# Patient Record
Sex: FEMALE | Race: WHITE | NOT HISPANIC OR LATINO | Employment: FULL TIME | URBAN - METROPOLITAN AREA
[De-identification: names, ages, dates, MRNs, and addresses within clinical notes are randomized per-mention and may not be internally consistent; named-entity substitution may affect disease eponyms.]

---

## 2019-04-17 ENCOUNTER — TRANSCRIBE ORDERS (OUTPATIENT)
Dept: ADMINISTRATIVE | Facility: HOSPITAL | Age: 59
End: 2019-04-17

## 2019-04-17 DIAGNOSIS — N95.0 POSTMENOPAUSAL BLEEDING: Primary | ICD-10-CM

## 2019-04-26 ENCOUNTER — HOSPITAL ENCOUNTER (OUTPATIENT)
Dept: ULTRASOUND IMAGING | Facility: HOSPITAL | Age: 59
Discharge: HOME | End: 2019-04-26
Attending: INTERNAL MEDICINE
Payer: COMMERCIAL

## 2019-04-26 DIAGNOSIS — N95.0 POSTMENOPAUSAL BLEEDING: ICD-10-CM

## 2019-04-26 PROCEDURE — 76856 US EXAM PELVIC COMPLETE: CPT

## 2019-04-26 PROCEDURE — 76830 TRANSVAGINAL US NON-OB: CPT

## 2020-03-16 ENCOUNTER — OFFICE VISIT (OUTPATIENT)
Dept: LAB | Facility: HOSPITAL | Age: 60
End: 2020-03-16
Attending: INTERNAL MEDICINE
Payer: COMMERCIAL

## 2020-03-16 ENCOUNTER — TRANSCRIBE ORDERS (OUTPATIENT)
Dept: ADMINISTRATIVE | Facility: HOSPITAL | Age: 60
End: 2020-03-16

## 2020-03-16 DIAGNOSIS — Z91.89 PERSONAL HISTORY OF POISONING, PRESENTING HAZARDS TO HEALTH: ICD-10-CM

## 2020-03-16 DIAGNOSIS — Z91.89 PERSONAL HISTORY OF POISONING, PRESENTING HAZARDS TO HEALTH: Primary | ICD-10-CM

## 2020-03-16 LAB
ATRIAL RATE: 89 BPM
P AXIS: 66 DEGREES
PR INTERVAL: 150 MS
QRS AXIS: 3 DEGREES
QRSD INTERVAL: 142 MS
QT INTERVAL: 402 MS
QTC INTERVAL: 489 MS
T WAVE AXIS: 30 DEGREES
VENTRICULAR RATE: 89 BPM

## 2020-03-16 PROCEDURE — 93005 ELECTROCARDIOGRAM TRACING: CPT

## 2020-03-16 PROCEDURE — 93010 ELECTROCARDIOGRAM REPORT: CPT | Performed by: INTERNAL MEDICINE

## 2021-12-16 ENCOUNTER — APPOINTMENT (OUTPATIENT)
Dept: RADIOLOGY | Facility: CLINIC | Age: 61
End: 2021-12-16

## 2021-12-16 ENCOUNTER — OFFICE VISIT (OUTPATIENT)
Dept: URGENT CARE | Facility: CLINIC | Age: 61
End: 2021-12-16

## 2021-12-16 VITALS
HEIGHT: 63 IN | OXYGEN SATURATION: 97 % | WEIGHT: 230 LBS | TEMPERATURE: 98.2 F | HEART RATE: 92 BPM | BODY MASS INDEX: 40.75 KG/M2 | RESPIRATION RATE: 18 BRPM

## 2021-12-16 DIAGNOSIS — S89.92XA INJURY OF LEFT KNEE, INITIAL ENCOUNTER: ICD-10-CM

## 2021-12-16 DIAGNOSIS — S89.92XA INJURY OF LEFT KNEE, INITIAL ENCOUNTER: Primary | ICD-10-CM

## 2021-12-16 DIAGNOSIS — M25.462 EFFUSION OF LEFT KNEE: ICD-10-CM

## 2021-12-16 PROCEDURE — 73564 X-RAY EXAM KNEE 4 OR MORE: CPT

## 2021-12-16 PROCEDURE — 99213 OFFICE O/P EST LOW 20 MIN: CPT | Performed by: NURSE PRACTITIONER

## 2021-12-16 RX ORDER — METHYLPREDNISOLONE 4 MG/1
TABLET ORAL
Qty: 21 TABLET | Refills: 0 | Status: SHIPPED | OUTPATIENT
Start: 2021-12-16

## 2021-12-19 ENCOUNTER — HOSPITAL ENCOUNTER (EMERGENCY)
Facility: HOSPITAL | Age: 61
Discharge: HOME | End: 2021-12-19
Attending: EMERGENCY MEDICINE
Payer: COMMERCIAL

## 2021-12-19 VITALS
RESPIRATION RATE: 16 BRPM | DIASTOLIC BLOOD PRESSURE: 80 MMHG | OXYGEN SATURATION: 97 % | TEMPERATURE: 98.3 F | HEART RATE: 90 BPM | SYSTOLIC BLOOD PRESSURE: 168 MMHG

## 2021-12-19 DIAGNOSIS — M25.562 LEFT KNEE PAIN: Primary | ICD-10-CM

## 2021-12-19 DIAGNOSIS — M19.90 ARTHRITIS: ICD-10-CM

## 2021-12-19 PROCEDURE — 99283 EMERGENCY DEPT VISIT LOW MDM: CPT

## 2021-12-19 PROCEDURE — 99284 EMERGENCY DEPT VISIT MOD MDM: CPT | Performed by: EMERGENCY MEDICINE

## 2021-12-19 RX ORDER — OXYCODONE HYDROCHLORIDE 5 MG/1
5 TABLET ORAL EVERY 6 HOURS PRN
Qty: 5 TABLET | Refills: 0 | Status: SHIPPED | OUTPATIENT
Start: 2021-12-19 | End: 2021-12-22

## 2021-12-19 RX ORDER — HYDROMORPHONE HCL/PF 1 MG/ML
0.5 SYRINGE (ML) INJECTION ONCE
Status: DISCONTINUED | OUTPATIENT
Start: 2021-12-19 | End: 2021-12-19

## 2021-12-19 RX ORDER — OXYCODONE HYDROCHLORIDE 5 MG/1
5 TABLET ORAL ONCE
Status: COMPLETED | OUTPATIENT
Start: 2021-12-19 | End: 2021-12-19

## 2021-12-19 RX ADMIN — OXYCODONE HYDROCHLORIDE 5 MG: 5 TABLET ORAL at 22:19

## 2021-12-22 ENCOUNTER — OFFICE VISIT (OUTPATIENT)
Dept: OBGYN CLINIC | Facility: CLINIC | Age: 61
End: 2021-12-22
Payer: COMMERCIAL

## 2021-12-22 ENCOUNTER — HOSPITAL ENCOUNTER (OUTPATIENT)
Dept: NON INVASIVE DIAGNOSTICS | Facility: HOSPITAL | Age: 61
Discharge: HOME | End: 2021-12-22
Attending: FAMILY MEDICINE
Payer: COMMERCIAL

## 2021-12-22 VITALS
BODY MASS INDEX: 40.74 KG/M2 | SYSTOLIC BLOOD PRESSURE: 154 MMHG | TEMPERATURE: 98.6 F | HEART RATE: 96 BPM | HEIGHT: 63 IN | DIASTOLIC BLOOD PRESSURE: 82 MMHG

## 2021-12-22 DIAGNOSIS — M25.462 EFFUSION OF LEFT KNEE: ICD-10-CM
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
## 2021-12-22 DIAGNOSIS — M95.8 OSTEOCHONDRAL DEFECT OF FEMORAL CONDYLE: ICD-10-CM

## 2021-12-22 DIAGNOSIS — M79.662 PAIN OF LEFT CALF: ICD-10-CM

## 2021-12-22 DIAGNOSIS — M79.662 PAIN OF LEFT CALF: Primary | ICD-10-CM

## 2021-12-22 PROCEDURE — 99205 OFFICE O/P NEW HI 60 MIN: CPT | Performed by: FAMILY MEDICINE

## 2021-12-22 PROCEDURE — 93971 EXTREMITY STUDY: CPT

## 2021-12-22 PROCEDURE — 93971 EXTREMITY STUDY: CPT | Performed by: SURGERY

## 2021-12-22 RX ORDER — HYDROCODONE BITARTRATE AND ACETAMINOPHEN 5; 325 MG/1; MG/1
1 TABLET ORAL EVERY 6 HOURS PRN
Qty: 15 TABLET | Refills: 0 | Status: SHIPPED | OUTPATIENT
Start: 2021-12-22

## 2021-12-22 RX ORDER — IBUPROFEN 800 MG/1
800 TABLET ORAL EVERY 8 HOURS PRN
Qty: 45 TABLET | Refills: 0 | Status: SHIPPED | OUTPATIENT
Start: 2021-12-22

## 2021-12-23 ENCOUNTER — TELEPHONE (OUTPATIENT)
Dept: OBGYN CLINIC | Facility: CLINIC | Age: 61
End: 2021-12-23

## 2021-12-29 ENCOUNTER — HOSPITAL ENCOUNTER (OUTPATIENT)
Dept: CT IMAGING | Facility: HOSPITAL | Age: 61
Discharge: HOME | End: 2021-12-29
Attending: FAMILY MEDICINE
Payer: COMMERCIAL
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
## 2021-12-29 DIAGNOSIS — M79.662 PAIN OF LEFT CALF: ICD-10-CM

## 2021-12-29 DIAGNOSIS — M25.462 EFFUSION OF LEFT KNEE: ICD-10-CM

## 2021-12-29 PROCEDURE — G1004 CDSM NDSC: HCPCS

## 2021-12-29 PROCEDURE — 73700 CT LOWER EXTREMITY W/O DYE: CPT

## 2022-01-03 ENCOUNTER — TELEPHONE (OUTPATIENT)
Dept: OBGYN CLINIC | Facility: CLINIC | Age: 62
End: 2022-01-03

## 2022-01-03 NOTE — TELEPHONE ENCOUNTER
Called to advise Chapin Crews an earlier appt times is not available  Chapin Zavalaws (daughter) asked if bloodwork could be ordered since her mother is still in a lot of pain and maybe it could be something else

## 2022-01-03 NOTE — TELEPHONE ENCOUNTER
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

## 2022-01-12 ENCOUNTER — OFFICE VISIT (OUTPATIENT)
Dept: OBGYN CLINIC | Facility: CLINIC | Age: 62
End: 2022-01-12
Payer: COMMERCIAL

## 2022-01-12 VITALS
DIASTOLIC BLOOD PRESSURE: 76 MMHG | HEIGHT: 63 IN | SYSTOLIC BLOOD PRESSURE: 126 MMHG | BODY MASS INDEX: 40.74 KG/M2 | HEART RATE: 105 BPM | TEMPERATURE: 97.7 F

## 2022-01-12 DIAGNOSIS — M25.562 ACUTE PAIN OF LEFT KNEE: ICD-10-CM
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
## 2022-01-12 DIAGNOSIS — M71.22 SYNOVIAL CYST OF POPLITEAL SPACE (BAKER), LEFT KNEE: ICD-10-CM

## 2022-01-12 DIAGNOSIS — M25.462 EFFUSION OF LEFT KNEE: Primary | ICD-10-CM

## 2022-01-12 PROCEDURE — 99214 OFFICE O/P EST MOD 30 MIN: CPT | Performed by: FAMILY MEDICINE

## 2022-01-12 PROCEDURE — 20610 DRAIN/INJ JOINT/BURSA W/O US: CPT | Performed by: FAMILY MEDICINE

## 2022-01-12 RX ORDER — LIDOCAINE HYDROCHLORIDE 10 MG/ML
4 INJECTION, SOLUTION INFILTRATION; PERINEURAL
Status: COMPLETED | OUTPATIENT
Start: 2022-01-12 | End: 2022-01-12

## 2022-01-12 RX ORDER — LEVOTHYROXINE SODIUM 88 UG/1
TABLET ORAL
COMMUNITY

## 2022-01-12 RX ORDER — TORSEMIDE 10 MG/1
10 TABLET ORAL DAILY
COMMUNITY
Start: 2022-01-06 | End: 2023-01-06

## 2022-01-12 RX ORDER — TRIAMCINOLONE ACETONIDE 40 MG/ML
40 INJECTION, SUSPENSION INTRA-ARTICULAR; INTRAMUSCULAR
Status: COMPLETED | OUTPATIENT
Start: 2022-01-12 | End: 2022-01-12

## 2022-01-12 RX ADMIN — LIDOCAINE HYDROCHLORIDE 4 ML: 10 INJECTION, SOLUTION INFILTRATION; PERINEURAL at 09:30

## 2022-01-12 RX ADMIN — TRIAMCINOLONE ACETONIDE 40 MG: 40 INJECTION, SUSPENSION INTRA-ARTICULAR; INTRAMUSCULAR at 09:30

## 2022-01-12 NOTE — PATIENT INSTRUCTIONS
F/u 1 wk  Icing/OTC pain meds as needed  Mild improvement in pain- 4cc of fluid aspirated and steroid injection given

## 2022-01-12 NOTE — LETTER
January 12, 2022     Patient: Luzma Nieto   YOB: 1960   Date of Visit: 1/12/2022       To Whom it May Concern:    Luzma Nieto is under my professional care  She was seen in my office on 1/12/2022  She is excused from work due to the medical condition I am treating them for from 1/12-19/2022  If you have any questions or concerns, please don't hesitate to call           Sincerely,          Carlos Champion MD        CC: Luzma Nieto

## 2022-01-20 ENCOUNTER — HOSPITAL ENCOUNTER (EMERGENCY)
Facility: HOSPITAL | Age: 62
Discharge: HOME | End: 2022-01-20
Attending: EMERGENCY MEDICINE | Admitting: EMERGENCY MEDICINE
Payer: COMMERCIAL

## 2022-01-20 ENCOUNTER — APPOINTMENT (EMERGENCY)
Dept: ULTRASOUND IMAGING | Facility: HOSPITAL | Age: 62
End: 2022-01-20
Payer: COMMERCIAL

## 2022-01-20 ENCOUNTER — OFFICE VISIT (OUTPATIENT)
Dept: OBGYN CLINIC | Facility: CLINIC | Age: 62
End: 2022-01-20
Payer: COMMERCIAL

## 2022-01-20 VITALS
RESPIRATION RATE: 18 BRPM | DIASTOLIC BLOOD PRESSURE: 66 MMHG | OXYGEN SATURATION: 97 % | TEMPERATURE: 98.5 F | HEART RATE: 89 BPM | SYSTOLIC BLOOD PRESSURE: 151 MMHG

## 2022-01-20 VITALS
BODY MASS INDEX: 40.75 KG/M2 | TEMPERATURE: 98.2 F | WEIGHT: 230 LBS | DIASTOLIC BLOOD PRESSURE: 81 MMHG | HEART RATE: 105 BPM | HEIGHT: 63 IN | SYSTOLIC BLOOD PRESSURE: 147 MMHG

## 2022-01-20 DIAGNOSIS — M71.22 SYNOVIAL CYST OF POPLITEAL SPACE (BAKER), LEFT KNEE: ICD-10-CM

## 2022-01-20 DIAGNOSIS — R60.9 EDEMA: ICD-10-CM

## 2022-01-20 DIAGNOSIS — M25.462 EFFUSION OF LEFT KNEE: Primary | ICD-10-CM
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
## 2022-01-20 DIAGNOSIS — N93.9 VAGINAL BLEEDING: Primary | ICD-10-CM

## 2022-01-20 DIAGNOSIS — M25.562 ACUTE PAIN OF LEFT KNEE: ICD-10-CM

## 2022-01-20 LAB
ANION GAP SERPL CALCULATED.3IONS-SCNC: 11 MMOL/L (ref 4–13)
APTT PPP: 32 SECONDS (ref 23–37)
BASOPHILS # BLD AUTO: 0.04 THOUSANDS/ΜL (ref 0–0.1)
BASOPHILS NFR BLD AUTO: 0 % (ref 0–1)
BUN SERPL-MCNC: 20 MG/DL (ref 5–25)
CALCIUM SERPL-MCNC: 9.8 MG/DL (ref 8.4–10.2)
CHLORIDE SERPL-SCNC: 97 MMOL/L (ref 96–108)
CO2 SERPL-SCNC: 28 MMOL/L (ref 21–32)
CREAT SERPL-MCNC: 0.61 MG/DL (ref 0.6–1.3)
EOSINOPHIL # BLD AUTO: 0.17 THOUSAND/ΜL (ref 0–0.61)
EOSINOPHIL NFR BLD AUTO: 1 % (ref 0–6)
ERYTHROCYTE [DISTWIDTH] IN BLOOD BY AUTOMATED COUNT: 12.9 % (ref 11.6–15.1)
GFR SERPL CREATININE-BSD FRML MDRD: 97 ML/MIN/1.73SQ M
GLUCOSE SERPL-MCNC: 169 MG/DL (ref 65–140)
HCT VFR BLD AUTO: 40 % (ref 34.8–46.1)
HGB BLD-MCNC: 13.3 G/DL (ref 11.5–15.4)
IMM GRANULOCYTES # BLD AUTO: 0.05 THOUSAND/UL (ref 0–0.2)
IMM GRANULOCYTES NFR BLD AUTO: 0 % (ref 0–2)
INR PPP: 1.02 (ref 0.84–1.19)
LYMPHOCYTES # BLD AUTO: 2.79 THOUSANDS/ΜL (ref 0.6–4.47)
LYMPHOCYTES NFR BLD AUTO: 22 % (ref 14–44)
MCH RBC QN AUTO: 30.6 PG (ref 26.8–34.3)
MCHC RBC AUTO-ENTMCNC: 33.3 G/DL (ref 31.4–37.4)
MCV RBC AUTO: 92 FL (ref 82–98)
MONOCYTES # BLD AUTO: 1.19 THOUSAND/ΜL (ref 0.17–1.22)
MONOCYTES NFR BLD AUTO: 10 % (ref 4–12)
NEUTROPHILS # BLD AUTO: 8.25 THOUSANDS/ΜL (ref 1.85–7.62)
NEUTS SEG NFR BLD AUTO: 67 % (ref 43–75)
NRBC BLD AUTO-RTO: 0 /100 WBCS
PLATELET # BLD AUTO: 408 THOUSANDS/UL (ref 149–390)
PMV BLD AUTO: 8.7 FL (ref 8.9–12.7)
POTASSIUM SERPL-SCNC: 3.6 MMOL/L (ref 3.5–5.3)
PROTHROMBIN TIME: 13.3 SECONDS (ref 11.6–14.5)
RBC # BLD AUTO: 4.35 MILLION/UL (ref 3.81–5.12)
SODIUM SERPL-SCNC: 136 MMOL/L (ref 135–147)
WBC # BLD AUTO: 12.49 THOUSAND/UL (ref 4.31–10.16)

## 2022-01-20 PROCEDURE — 85610 PROTHROMBIN TIME: CPT | Performed by: EMERGENCY MEDICINE

## 2022-01-20 PROCEDURE — 80048 BASIC METABOLIC PNL TOTAL CA: CPT | Performed by: EMERGENCY MEDICINE

## 2022-01-20 PROCEDURE — 76830 TRANSVAGINAL US NON-OB: CPT

## 2022-01-20 PROCEDURE — 99284 EMERGENCY DEPT VISIT MOD MDM: CPT | Performed by: EMERGENCY MEDICINE

## 2022-01-20 PROCEDURE — 85730 THROMBOPLASTIN TIME PARTIAL: CPT | Performed by: EMERGENCY MEDICINE

## 2022-01-20 PROCEDURE — 76856 US EXAM PELVIC COMPLETE: CPT

## 2022-01-20 PROCEDURE — 99284 EMERGENCY DEPT VISIT MOD MDM: CPT

## 2022-01-20 PROCEDURE — 36415 COLL VENOUS BLD VENIPUNCTURE: CPT | Performed by: EMERGENCY MEDICINE

## 2022-01-20 PROCEDURE — 85025 COMPLETE CBC W/AUTO DIFF WBC: CPT | Performed by: EMERGENCY MEDICINE

## 2022-01-20 PROCEDURE — 99214 OFFICE O/P EST MOD 30 MIN: CPT | Performed by: FAMILY MEDICINE

## 2022-01-20 NOTE — LETTER
January 20, 2022     Patient: Kacie Quan   YOB: 1960   Date of Visit: 1/20/2022       To Whom it May Concern:    Kacie Quan is under my professional care  She was seen in my office on 1/20/2022  She is excused from work due to the medical condition I am treating her for from 1/20/22-2/20/22  If you have any questions or concerns, please don't hesitate to call           Sincerely,          Oswaldo Davila MD        CC: Kacie Quan

## 2022-01-20 NOTE — PROGRESS NOTES
[facility] [place] [address]  [place] [id number] [phone]      Chief Complaint:  Chief Complaint   Patient presents with    Left Knee - Follow-up       Vitals:  /81   Pulse 105   Temp 98 2 °F (36 8 °C)   Ht 5' 3" (1 6 m)   Wt 104 kg (230 lb)   BMI 40 74 kg/m²     The following portions of the patient's history were reviewed and updated as appropriate: allergies, current medications, past family history, past medical history, past social history, past surgical history, and problem list       Subjective:   Patient ID: Gavin Solis is a 58 y o  female  Here for f/u  L knee pain/swelling  Injection helped for 24 hrs but then pain returned  Still having pain  Hurts to walk  Using cane/walker/wheelchair  Pain/pressure in the back of the knee  Taking tylenol/advil-not helping  Sharp pain  Certain movements worsen pain  Better with sitting      Review of Systems   Constitutional: Negative for fatigue and fever  Respiratory: Positive for shortness of breath  Cardiovascular: Negative for chest pain  Gastrointestinal: Positive for abdominal pain  Negative for nausea  Genitourinary: Negative for dysuria  Musculoskeletal: Positive for arthralgias  Skin: Negative for rash and wound  Neurological: Negative for weakness and headaches  Objective:  Left Knee Exam     Tenderness   The patient is experiencing tenderness in the medial joint line  Range of Motion   Extension: abnormal   Flexion: abnormal     Other   Swelling: moderate  Effusion: no effusion present          Observations   Left Knee   Negative for effusion  Physical Exam  Constitutional:       Appearance: Normal appearance  She is normal weight  HENT:      Head: Normocephalic  Eyes:      Extraocular Movements: Extraocular movements intact     Pulmonary:      Effort: Pulmonary effort is normal    Musculoskeletal:         General: Tenderness present  Cervical back: Normal range of motion  Left knee: No effusion  Skin:     General: Skin is warm and dry  Neurological:      General: No focal deficit present  Mental Status: She is alert and oriented to person, place, and time  Mental status is at baseline  Psychiatric:         Mood and Affect: Mood normal          Behavior: Behavior normal          Thought Content: Thought content normal          Judgment: Judgment normal                Assessment/Plan:  Assessment/Plan   Diagnoses and all orders for this visit:    Effusion of left knee  -     Ambulatory Referral to Orthopedic Surgery; Future    Fall, subsequent encounter  -     Ambulatory Referral to Orthopedic Surgery; Future    Acute pain of left knee  -     Ambulatory Referral to Orthopedic Surgery; Future    Synovial cyst of popliteal space (Coty Valente), left knee  -     Ambulatory Referral to Orthopedic Surgery; Future        Return if symptoms worsen or fail to improve, for f/u with Dr Leonel Nazario MD

## 2022-01-20 NOTE — PATIENT INSTRUCTIONS
F/u here as needed  Referral to Dr Buddy Jaramillo  CT showed OA, but no OCD defect- but still having a lot of pain/swelling  Unable to aspirate more than 5 cc  Icing/heat/OTC pain meds as needed  Continue using cane/walker/wheelchair as needed

## 2022-01-21 NOTE — DISCHARGE INSTRUCTIONS
Follow up with gynecology for further care for your vaginal bleeding and to rule out cancer  This is important whether or not your vaginal bleeding resolves or not  Return at anytime for any reason      Follow up with the wound clinic for your lower extremity swelling (edema)    Return if symptoms worsen or if new symptoms develop

## 2022-01-21 NOTE — ED PROVIDER NOTES
History  Chief Complaint   Patient presents with    Abdominal Pain     pt reports lower abdominal since last night     Vaginal Bleeding     pt reports vaginal bleeding since last night and changing her pad every 4hrs     62F presenting with chief complaint of vaginal bleeding and right pelvic pain since yesterday evening  Denies nausea/vomiting  Denies urinary symptoms  Denies fevers  Says she's at least 5 years post menopausal  States she went through 3-4 pads over the last 24 hours  Denies symptoms of anemia  No anticoagulation or antiplatelet  Vaginal Bleeding  Quality:  Bright red  Severity:  Mild  Onset quality:  Gradual  Timing:  Constant  Chronicity:  New  Menstrual history:  Irregular and postmenopausal  Associated symptoms: no abdominal pain, no back pain, no dysuria, no fever and no nausea        Prior to Admission Medications   Prescriptions Last Dose Informant Patient Reported? Taking? HYDROcodone-acetaminophen (Norco) 5-325 mg per tablet   No No   Sig: Take 1 tablet by mouth every 6 (six) hours as needed for pain Max Daily Amount: 4 tablets   ibuprofen (MOTRIN) 800 mg tablet   No No   Sig: Take 1 tablet (800 mg total) by mouth every 8 (eight) hours as needed for mild pain   Patient not taking: Reported on 1/12/2022    levothyroxine 88 mcg tablet   Yes No   Sig: levothyroxine sodium 88 mcg tabs   methylPREDNISolone 4 MG tablet therapy pack   No No   Sig: Use as directed on package   Patient not taking: Reported on 1/12/2022    torsemide (DEMADEX) 10 mg tablet   Yes No   Sig: Take 10 mg by mouth daily      Facility-Administered Medications: None       Past Medical History:   Diagnosis Date    Hypothyroidism     Hypothyroidism     Kidney stones        Past Surgical History:   Procedure Laterality Date    TUBAL LIGATION         History reviewed  No pertinent family history  I have reviewed and agree with the history as documented      E-Cigarette/Vaping    E-Cigarette Use Never User E-Cigarette/Vaping Substances     Social History     Tobacco Use    Smoking status: Never Smoker    Smokeless tobacco: Never Used   Vaping Use    Vaping Use: Never used   Substance Use Topics    Alcohol use: Not Currently    Drug use: Never       Review of Systems   Constitutional: Negative for chills and fever  HENT: Negative for congestion, nosebleeds, rhinorrhea and sore throat  Eyes: Negative for pain and visual disturbance  Respiratory: Negative for cough and wheezing  Cardiovascular: Positive for leg swelling  Negative for chest pain  Gastrointestinal: Negative for abdominal distention, abdominal pain, diarrhea, nausea and vomiting  Genitourinary: Positive for vaginal bleeding  Negative for dysuria and frequency  Musculoskeletal: Negative for back pain and joint swelling  Skin: Negative for rash and wound  Neurological: Negative for weakness and numbness  Psychiatric/Behavioral: Negative for decreased concentration and suicidal ideas  Physical Exam  Physical Exam  Vitals and nursing note reviewed  Constitutional:       Appearance: She is well-developed  HENT:      Head: Normocephalic and atraumatic  Eyes:      Conjunctiva/sclera: Conjunctivae normal       Pupils: Pupils are equal, round, and reactive to light  Neck:      Trachea: No tracheal deviation  Cardiovascular:      Rate and Rhythm: Normal rate and regular rhythm  Heart sounds: Normal heart sounds  No murmur heard  Pulmonary:      Effort: Pulmonary effort is normal  No respiratory distress  Breath sounds: Normal breath sounds  No wheezing or rales  Abdominal:      General: Bowel sounds are normal  There is no distension  Palpations: Abdomen is soft  Tenderness: There is no abdominal tenderness  Musculoskeletal:         General: No deformity  Cervical back: Normal range of motion and neck supple  Skin:     General: Skin is warm and dry        Capillary Refill: Capillary refill takes less than 2 seconds  Neurological:      Mental Status: She is alert and oriented to person, place, and time  Sensory: No sensory deficit     Psychiatric:         Judgment: Judgment normal          Vital Signs  ED Triage Vitals [01/20/22 1926]   Temperature Pulse Respirations Blood Pressure SpO2   98 5 °F (36 9 °C) 89 18 151/66 97 %      Temp src Heart Rate Source Patient Position - Orthostatic VS BP Location FiO2 (%)   -- Monitor -- Left arm --      Pain Score       8           Vitals:    01/20/22 1926   BP: 151/66   Pulse: 89         Visual Acuity      ED Medications  Medications - No data to display    Diagnostic Studies  Results Reviewed     Procedure Component Value Units Date/Time    Basic metabolic panel [[id number]]  (Abnormal) Collected: 01/20/22 2042    Lab Status: Final result Specimen: Blood from Hand, Left Updated: 01/20/22 2117     Sodium 136 mmol/L      Potassium 3 6 mmol/L      Chloride 97 mmol/L      CO2 28 mmol/L      ANION GAP 11 mmol/L      BUN 20 mg/dL      Creatinine 0 61 mg/dL      Glucose 169 mg/dL      Calcium 9 8 mg/dL      eGFR 97 ml/min/1 73sq m     Narrative:      Claudy guidelines for Chronic Kidney Disease (CKD):     Stage 1 with normal or high GFR (GFR > 90 mL/min/1 73 square meters)    Stage 2 Mild CKD (GFR = 60-89 mL/min/1 73 square meters)    Stage 3A Moderate CKD (GFR = 45-59 mL/min/1 73 square meters)    Stage 3B Moderate CKD (GFR = 30-44 mL/min/1 73 square meters)    Stage 4 Severe CKD (GFR = 15-29 mL/min/1 73 square meters)    Stage 5 End Stage CKD (GFR <15 mL/min/1 73 square meters)  Note: GFR calculation is accurate only with a steady state creatinine    Protime-INR [[id number]]  (Normal) Collected: 01/20/22 2042    Lab Status: Final result Specimen: Blood from Hand, Left Updated: 01/20/22 2105     Protime 13 3 seconds      INR 1 02    APTT [[id number]]  (Normal) Collected: 01/20/22 2042    Lab Status: Final result Specimen: Blood from Hand, Left Updated: 01/20/22 2105     PTT 32 seconds     CBC and differential [[id number]]  (Abnormal) Collected: 01/20/22 2042    Lab Status: Final result Specimen: Blood from Hand, Left Updated: 01/20/22 2053     WBC 12 49 Thousand/uL      RBC 4 35 Million/uL      Hemoglobin 13 3 g/dL      Hematocrit 40 0 %      MCV 92 fL      MCH 30 6 pg      MCHC 33 3 g/dL      RDW 12 9 %      MPV 8 7 fL      Platelets 454 Thousands/uL      nRBC 0 /100 WBCs      Neutrophils Relative 67 %      Immat GRANS % 0 %      Lymphocytes Relative 22 %      Monocytes Relative 10 %      Eosinophils Relative 1 %      Basophils Relative 0 %      Neutrophils Absolute 8 25 Thousands/µL      Immature Grans Absolute 0 05 Thousand/uL      Lymphocytes Absolute 2 79 Thousands/µL      Monocytes Absolute 1 19 Thousand/µL      Eosinophils Absolute 0 17 Thousand/µL      Basophils Absolute 0 04 Thousands/µL                  US pelvis complete w transvaginal   Final Result by Flores Herman MD (01/20 2042)          1  Abnormal endometrial thickening in a postmenopausal patient  Recommend nonemergent OB/GYN consultation and endometrial sampling  2   Findings again consistent with adenomyosis  3   Echogenic 1 1 cm lesion in the right ovary may represent a dermoid  No evidence of ovarian torsion  Workstation performed: [id number]                    Procedures  Procedures         ED Course  ED Course as of 01/20/22 2144   Thu Jan 20, 2022 2143 H&H stable  Will give compression stocking for chronic LLE edema, wound care follow up  Minimal spotting in ER  Informed of all findings, recommend outpatient GYN to rule out cancer  Patient and daughter comfortable with discharge and return precautions  SBIRT 20yo+      Most Recent Value   SBIRT (24 yo +)    In order to provide better care to our patients, we are screening all of our patients for alcohol and drug use   Would it be okay to ask you these screening questions? Yes Filed at: 01/20/2022 1929   Initial Alcohol Screen: US AUDIT-C     1  How often do you have a drink containing alcohol? 0 Filed at: 01/20/2022 1929   2  How many drinks containing alcohol do you have on a typical day you are drinking? 0 Filed at: 01/20/2022 1929   3a  Male UNDER 65: How often do you have five or more drinks on one occasion? 0 Filed at: 01/20/2022 1929   3b  FEMALE Any Age, or MALE 65+: How often do you have 4 or more drinks on one occassion? 0 Filed at: 01/20/2022 1929   Audit-C Score 0 Filed at: 01/20/2022 1929   HERNESTO: How many times in the past year have you    Used an illegal drug or used a prescription medication for non-medical reasons? Never Filed at: 01/20/2022 1929                    MDM  Number of Diagnoses or Management Options  Diagnosis management comments: RLQ pain and vaginal bleeding concerning for ovarian torsion versus malignancy or both deferred gyn exam  Also with LLQ swelling chronic in nature, negative DVT studies, no acute intervention required  This does increase suspicion for malignancy             Amount and/or Complexity of Data Reviewed  Review and summarize past medical records: yes  Independent visualization of images, tracings, or specimens: yes    Risk of Complications, Morbidity, and/or Mortality  Presenting problems: high  Diagnostic procedures: minimal  Management options: high        Disposition  Final diagnoses:   Vaginal bleeding   Edema     Time reflects when diagnosis was documented in both MDM as applicable and the Disposition within this note     Time User Action Codes Description Comment    1/20/2022  9:27 PM Rusty Zavala Add [N93 9] Vaginal bleeding     1/20/2022  9:30 PM Rusty Heman Add [R60 9] Edema       ED Disposition     ED Disposition Condition Date/Time Comment    Discharge Stable u Jan 20, 2022  9:27 PM Malvin Torrez discharge to home/self care              Follow-up Information     Follow up With Specialties Details Why Contact Info Additional Information    Pam Benson MD Obstetrics and Gynecology, Obstetrics, Gynecology Schedule an appointment as soon as possible for a visit   [address] [facility] [address]  [phone]       [address] Wound Care Schedule an appointment as soon as possible for a visit   [address] [place] [address] [address], [address] [place] [address], [place], [address] [place] [address]   [phone]          Patient's Medications   Discharge Prescriptions    No medications on file           PDMP Review       Value Time User    PDMP Reviewed  Yes 12/22/2021  2:31 PM Harshal Pierre MD          ED Provider  Electronically Signed by           Mazin Germain DO  01/20/22 7829

## 2022-01-21 NOTE — ED NOTES
[facility], [address] [place] [address]  01/20/22 1936

## 2022-01-24 ENCOUNTER — OFFICE VISIT (OUTPATIENT)
Dept: OBGYN CLINIC | Facility: CLINIC | Age: 62
End: 2022-01-24
Payer: COMMERCIAL

## 2022-01-24 VITALS
SYSTOLIC BLOOD PRESSURE: 160 MMHG | WEIGHT: 230 LBS | BODY MASS INDEX: 40.75 KG/M2 | HEIGHT: 63 IN | DIASTOLIC BLOOD PRESSURE: 95 MMHG | HEART RATE: 94 BPM

## 2022-01-24 DIAGNOSIS — M25.562 LEFT KNEE PAIN, UNSPECIFIED CHRONICITY: Primary | ICD-10-CM

## 2022-01-24 DIAGNOSIS — M71.22 SYNOVIAL CYST OF POPLITEAL SPACE (BAKER), LEFT KNEE: ICD-10-CM

## 2022-01-24 DIAGNOSIS — M17.12 PRIMARY OSTEOARTHRITIS OF LEFT KNEE: ICD-10-CM

## 2022-01-24 DIAGNOSIS — M25.462 EFFUSION OF LEFT KNEE: ICD-10-CM
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
## 2022-01-24 DIAGNOSIS — M25.562 ACUTE PAIN OF LEFT KNEE: ICD-10-CM

## 2022-01-24 PROCEDURE — 99203 OFFICE O/P NEW LOW 30 MIN: CPT | Performed by: ORTHOPAEDIC SURGERY

## 2022-01-24 NOTE — PATIENT INSTRUCTIONS
Intra-articular Hyaluronic Acid Injection  Intra-articular Hyaluronic Acid Injection Brands  There are several types of Intra-articular Hyaluronic Acid Injections which vary in brand, dosage, and frequency  There are single dose injections as well as a series of injections  Your provider will choose the injection brand and series based on clinical factors as well as what your insurance company prefers or covers  Buy and Bill vs  Specialty Pharmacy  Injections may be obtained in two ways:   Buy and Bill: The insurance is requiring the office to buy the injection medication and bill the patients insurance for both the injection medication and the office visit   Specialty Pharmacy: The insurance is requiring the office to order the medication from the insurances Specialty Pharmacy and the specialty pharmacy will bill the patients insurance directly for the injection medication  When a specialty pharmacy is used, the office cannot bill for the injection medication, the office can only bill for the office visit  (Examples of a specialty pharmacy include, but not limited to, [facility] , [address] [facility], [address] [place] [address])  Time Line Expectations  Your care team will work to ensure the injection(s) being ordered for you are authorized by your insurance and is obtained by the insurance plans preferred pharmacy  Your insurance plan may dictate the brand and dosage of the series  Due to the nature of obtaining an insurance authorization as well as working with the pharmacy, the authorization process may take up to 14 business days, sometimes longer if your insurance plan denies the injection authorization or if the pharmacy has delays  If your insurance plan denies the authorization our team will submit an appeal which may lengthen the wait time for the approval of your injection      Our injection authorization team will be in contact with you throughout the injection authorization process, however, please note there may be times of silence while we wait for insurance and pharmacy updates  Your injection appointment(s) will be scheduled once our injection authorization team has received approval from your insurance plan and/or from the pharmacy  Please note: Specialty pharmacies are often delayed when obtaining authorizations and verifying benefits for injection medications  You may receive a phone call from the specialty pharmacy to authorize the medication; so it is important to accept calls from the specialty pharmacy to ensure your injections are not delayed

## 2022-01-24 NOTE — PROGRESS NOTES
Assessment:  1  Left knee pain, unspecified chronicity  XR knee 3 vw left non injury    Injection procedure prior authorization   2  Primary osteoarthritis of left knee  Injection procedure prior authorization       Plan:  Left knee osteoarthritis  The patient has on-going left knee pain with severe arthritis on radiograph  She has tried oral medications, steroid injection and activity modification with limited benefit  Left knee visco-supplement is ordered and the patient should follow up for applcation of this medication  The patient has advanced arthritic changes along her left knee  Physical examination shows limited range of motion  There is diffuse medial and lateral joint tenderness  X-rays performed show no medial joint space remaining  Treatment options were discussed with the patient and her son in great detail  They are not ready for any knee replacement surgery  They want to try a conservative route  The next viable step would be to try viscosupplementation  We will get her approved this medication and she returned back once this occurs  If there is any problems sooner, she will not hesitate to let us know    To do next visit:  Return for visc-supplement injections  The above stated was discussed in layman's terms and the patient expressed understanding  All questions were answered to the patient's satisfaction  Scribe Attestation    I,:  Astrid Busby am acting as a scribe while in the presence of the attending physician :       I,:  aJcky Barrow, DO personally performed the services described in this documentation    as scribed in my presence :             Subjective:   Mary Chavez is a 58 y o  female who presents for initial evaluation of left knee  She is here from Dr Galindo including recent steroid injection with 24 hours of relief  She has had symptoms for about 2 months with no injury  Today she complains of generalized and posterior left knee pain    Walking and most activity aggravates while rest alleviates  She has used Tylenol and Advil with limited benefit  She had used a cane and now uses a walker  She denies past left knee surgery  She does work at [employer] yet is currently not working due to this issue  Review of systems negative unless otherwise specified in HPI    Past Medical History:   Diagnosis Date    Hypothyroidism     Hypothyroidism     Kidney stones        Past Surgical History:   Procedure Laterality Date    TUBAL LIGATION         History reviewed  No pertinent family history      Social History     Occupational History    Not on file   Tobacco Use    Smoking status: Never Smoker    Smokeless tobacco: Never Used   Vaping Use    Vaping Use: Never used   Substance and Sexual Activity    Alcohol use: Not Currently    Drug use: Never    Sexual activity: Not on file         Current Outpatient Medications:     HYDROcodone-acetaminophen (Norco) 5-325 mg per tablet, Take 1 tablet by mouth every 6 (six) hours as needed for pain Max Daily Amount: 4 tablets, Disp: 15 tablet, Rfl: 0    levothyroxine 88 mcg tablet, levothyroxine sodium 88 mcg tabs, Disp: , Rfl:     torsemide (DEMADEX) 10 mg tablet, Take 10 mg by mouth daily, Disp: , Rfl:     ibuprofen (MOTRIN) 800 mg tablet, Take 1 tablet (800 mg total) by mouth every 8 (eight) hours as needed for mild pain (Patient not taking: Reported on 1/12/2022 ), Disp: 45 tablet, Rfl: 0    methylPREDNISolone 4 MG tablet therapy pack, Use as directed on package (Patient not taking: Reported on 1/12/2022 ), Disp: 21 tablet, Rfl: 0    Allergies   Allergen Reactions    Sulfamethoxazole-Trimethoprim Hives            Vitals:    01/24/22 1027   BP: 160/95   Pulse: 94       Objective:  Physical exam  · General: Awake, Alert, Oriented  · Eyes: Pupils equal, round and reactive to light  · Heart: regular rate and rhythm  · Lungs: No audible wheezing  · Abdomen: soft                    Ortho Exam  Left knee:  No erythema or ecchymosis  Mild effusion and mild swelling  Normal strength  Good ROM with crepitus 5-120  Calf compartments soft and supple  Sensation intact  Toes are warm sensate and mobile        Diagnostics, reviewed and taken today if performed as documented: The attending physician has personally reviewed the pertinent films in PACS and interpretation is as follows:  Left knee x-ray:  Severe arthritic changes with bone on bone apposition  Procedures, if performed today:    Procedures    None performed      Portions of the record may have been created with voice recognition software  Occasional wrong word or "sound a like" substitutions may have occurred due to the inherent limitations of voice recognition software  Read the chart carefully and recognize, using context, where substitutions have occurred

## 2022-02-04 ENCOUNTER — OFFICE VISIT (OUTPATIENT)
Dept: OBGYN CLINIC | Facility: CLINIC | Age: 62
End: 2022-02-04
Payer: COMMERCIAL

## 2022-02-04 VITALS — BODY MASS INDEX: 40.75 KG/M2 | WEIGHT: 230 LBS | HEIGHT: 63 IN

## 2022-02-04 DIAGNOSIS — M25.562 LEFT KNEE PAIN, UNSPECIFIED CHRONICITY: ICD-10-CM

## 2022-02-04 DIAGNOSIS — M17.12 PRIMARY OSTEOARTHRITIS OF LEFT KNEE: Primary | ICD-10-CM

## 2022-02-04 PROCEDURE — 99213 OFFICE O/P EST LOW 20 MIN: CPT | Performed by: ORTHOPAEDIC SURGERY

## 2022-02-04 NOTE — PROGRESS NOTES
Assessment/Plan:   Diagnoses and all orders for this visit:    Primary osteoarthritis of left knee  -     Injection Procedure Prior Authorization; Future    Left knee pain, unspecified chronicity  -     Injection Procedure Prior Authorization; Future    Reviewed today's physical exam findings with patient at time of visit  Discussed with the patient that if her insurance company will not cover viscosupplementation injections, her options are to continue with cortisone injections, try to diminish her symptoms with activity modification and OTC NSAIDs/Tylenol, or consider surgical intervention via knee replacement surgery  A 2nd attempt at viscosupplementation prior authorization has been input to the system  She will be contacted if/when Visco injections have been obtained in the office  If she has again declined, we recommend that she consider knee replacement surgery  Patient expresses understanding and is in agreement with this treatment plan  Viscosupplementation was denied by her insurance  The patient states it is approved  Unfortunate, there is a discrepancy with both history is  I recommend the patient contact her insurance company again with a employees name and number as well as from our standpoint we will look into this again  The patient has [place]  The only other option would be knee replacement  We will start looking into this as well, to see if it is feasible with her insurance  She states the last cortisone injection did not help  Physical examination shows diffuse medial and lateral joint tenderness with patellofemoral crepitation  There is a painful arc of motion throughout her left knee    Subjective:   Patient ID: Main Harvey  1960     HPI  Patient is a 58 y o  female who presents for follow-up evaluation of primary osteoarthritis of the left knee    She was last seen in regards to this issue on 1/24/2022, which time authorization request for viscosupplementation was put to the system  Unfortunately, the request was denied by her insurance company  On today's presentation she reports that she has continued loss of range of motion, knee pain and stiffness, and difficulty with weight-bearing  She also reports swelling, but denies any associated numbness, tingling, or mechanical locking  The following portions of the patient's history were reviewed and updated as appropriate:  Past medical history, past surgical history, Family history, social history, current medications and allergies    Past Medical History:   Diagnosis Date    Hypothyroidism     Hypothyroidism     Kidney stones        Past Surgical History:   Procedure Laterality Date    TUBAL LIGATION         History reviewed  No pertinent family history  Social History     Socioeconomic History    Marital status:       Spouse name: None    Number of children: None    Years of education: None    Highest education level: None   Occupational History    None   Tobacco Use    Smoking status: Never Smoker    Smokeless tobacco: Never Used   Vaping Use    Vaping Use: Never used   Substance and Sexual Activity    Alcohol use: Not Currently    Drug use: Never    Sexual activity: None   Other Topics Concern    None   Social History Narrative    None     Social Determinants of Health     Financial Resource Strain: Not on file   Food Insecurity: Not on file   Transportation Needs: Not on file   Physical Activity: Not on file   Stress: Not on file   Social Connections: Not on file   Intimate Partner Violence: Not on file   Housing Stability: Not on file         Current Outpatient Medications:     HYDROcodone-acetaminophen (Norco) 5-325 mg per tablet, Take 1 tablet by mouth every 6 (six) hours as needed for pain Max Daily Amount: 4 tablets, Disp: 15 tablet, Rfl: 0    levothyroxine 88 mcg tablet, levothyroxine sodium 88 mcg tabs, Disp: , Rfl:     torsemide (DEMADEX) 10 mg tablet, Take 10 mg by mouth daily, Disp: , Rfl:     ibuprofen (MOTRIN) 800 mg tablet, Take 1 tablet (800 mg total) by mouth every 8 (eight) hours as needed for mild pain (Patient not taking: Reported on 1/12/2022 ), Disp: 45 tablet, Rfl: 0    methylPREDNISolone 4 MG tablet therapy pack, Use as directed on package (Patient not taking: Reported on 1/12/2022 ), Disp: 21 tablet, Rfl: 0    Allergies   Allergen Reactions    Sulfamethoxazole-Trimethoprim Hives       Review of Systems   Constitutional: Negative for fatigue  Gastrointestinal: Negative for nausea  Musculoskeletal:        As noted in HPI   Neurological: Negative for dizziness, weakness, numbness and headaches  All other systems reviewed and are negative  Objective:  Ht 5' 3" (1 6 m)   Wt 104 kg (230 lb)   BMI 40 74 kg/m²     Ortho Exam  Left knee -   Patient ambulates with antalgic gait pattern  Uses walker as assistive device  No anatomical deformity  Skin is warm and dry to touch with no signs of erythema, ecchymosis, infection  Mild soft tissue swelling, treat effusion noted  ROM 10°-80°  TTP over medial joint line, TTP over lateral joint line  Flexor and extensor mechanisms intact  Knee is stable to varus and valgus stress  - Lachman's  - Anterior Drawer, - Posterior Drawer  - medial Chrissy's, - lateral Chrissy's  - Pivot Shift  Patella tracks centrally with palpable crepitus  Calf compartments are soft and supple  2+ TP and DP pulses with brisk capillary refill to the toes  Sural, saphenous, tibial, superficial and deep peroneal motor and sensory distributions intact  Sensation light touch intact distally    Physical Exam  Vitals and nursing note reviewed  Constitutional:       General: She is not in acute distress  Appearance: She is well-developed  HENT:      Head: Normocephalic and atraumatic  Eyes:      Conjunctiva/sclera: Conjunctivae normal    Cardiovascular:      Rate and Rhythm: Normal rate     Pulmonary:      Effort: Pulmonary effort is normal    Musculoskeletal:      Cervical back: Neck supple  Skin:     General: Skin is warm and dry  Capillary Refill: Capillary refill takes less than 2 seconds  Neurological:      Mental Status: She is alert and oriented to person, place, and time     Psychiatric:         Mood and Affect: Mood normal          Behavior: Behavior normal           Diagnostic Test Review:  No new imaging reviewed this visit    Procedures   No procedures performed this visit    Scribe Attestation    I,:  Adriane Ramírez am acting as a scribe while in the presence of the attending physician :       I,:  Vidhi Hess DO personally performed the services described in this documentation    as scribed in my presence :

## 2022-02-07 ENCOUNTER — TELEPHONE (OUTPATIENT)
Dept: OBGYN CLINIC | Facility: CLINIC | Age: 62
End: 2022-02-07

## 2022-02-07 NOTE — TELEPHONE ENCOUNTER
Dr Lexi Hassan son Suni Mendoza called to follow up on her disability leave paperwork that was brought into the [place] office on 1/20/22 not found in log  Please call him [phone]    Thank you

## 2022-02-10 NOTE — TELEPHONE ENCOUNTER
Patient is calling to check on the status of this   She stated her employer is threatening to terminate her because her paperwork is not completed, I still do not see any paperwork scanned in or on the log but states this was dropped off in the office      Patient is requesting a callback today [phone]

## 2022-02-10 NOTE — TELEPHONE ENCOUNTER
Spoke with patient who stated that she had given the paperwork to the lady at the  in [facility] on 1/20  Stated that she needed to have the paperwork completed and handed in by Monday or she would lose her job  Made her aware that I would speak with  staff and see if they have collected the paperwork  I will call her back either before I leave today or tomorrow morning once I hear back from the  staff

## 2022-02-10 NOTE — TELEPHONE ENCOUNTER
Left message for patient to give us a call back  There is no disability paperwork in her chart  I did see that she dropped it off on 1/20/22 but the [place] office is not open on a Thursday, did she drop it off at the wrong location  Call back number was given for patient to give us a call back to speak about this paperwork

## 2022-02-10 NOTE — TELEPHONE ENCOUNTER
Patient calling back to speak to office about the paperwork  She is asking for a call back at [phone]  She is waiting for this call  Padilla Hyatt

## 2022-02-11 NOTE — TELEPHONE ENCOUNTER
Pt is asking for a updated work note extending the date until 3/5/22 right now it only covers her until 2/20/22  Would you be able to extend date and write a new letter?

## 2022-02-11 NOTE — TELEPHONE ENCOUNTER
Please send the paperwork to the appropriate party that completes it    I believe there is a specific team

## 2022-02-14 NOTE — TELEPHONE ENCOUNTER
Left message  to pt  to make aware we were able to extend work note until 3/5/22  Forms were completed and faxed

## 2022-04-19 ENCOUNTER — HOSPITAL ENCOUNTER (EMERGENCY)
Facility: HOSPITAL | Age: 62
Discharge: HOME | End: 2022-04-19
Attending: EMERGENCY MEDICINE | Admitting: EMERGENCY MEDICINE
Payer: COMMERCIAL

## 2022-04-19 VITALS
SYSTOLIC BLOOD PRESSURE: 150 MMHG | DIASTOLIC BLOOD PRESSURE: 79 MMHG | RESPIRATION RATE: 18 BRPM | OXYGEN SATURATION: 97 % | TEMPERATURE: 98.5 F | HEART RATE: 84 BPM

## 2022-04-19 DIAGNOSIS — R11.10 VOMITING: Primary | ICD-10-CM

## 2022-04-19 LAB
BASOPHILS # BLD AUTO: 0.04 THOUSANDS/ΜL (ref 0–0.1)
BASOPHILS NFR BLD AUTO: 0 % (ref 0–1)
EOSINOPHIL # BLD AUTO: 0.15 THOUSAND/ΜL (ref 0–0.61)
EOSINOPHIL NFR BLD AUTO: 2 % (ref 0–6)
ERYTHROCYTE [DISTWIDTH] IN BLOOD BY AUTOMATED COUNT: 13.3 % (ref 11.6–15.1)
HCT VFR BLD AUTO: 43.4 % (ref 34.8–46.1)
HGB BLD-MCNC: 14.2 G/DL (ref 11.5–15.4)
IMM GRANULOCYTES # BLD AUTO: 0.04 THOUSAND/UL (ref 0–0.2)
IMM GRANULOCYTES NFR BLD AUTO: 0 % (ref 0–2)
LYMPHOCYTES # BLD AUTO: 1.61 THOUSANDS/ΜL (ref 0.6–4.47)
LYMPHOCYTES NFR BLD AUTO: 16 % (ref 14–44)
MCH RBC QN AUTO: 30.6 PG (ref 26.8–34.3)
MCHC RBC AUTO-ENTMCNC: 32.7 G/DL (ref 31.4–37.4)
MCV RBC AUTO: 94 FL (ref 82–98)
MONOCYTES # BLD AUTO: 0.68 THOUSAND/ΜL (ref 0.17–1.22)
MONOCYTES NFR BLD AUTO: 7 % (ref 4–12)
NEUTROPHILS # BLD AUTO: 7.53 THOUSANDS/ΜL (ref 1.85–7.62)
NEUTS SEG NFR BLD AUTO: 75 % (ref 43–75)
NRBC BLD AUTO-RTO: 0 /100 WBCS
PLATELET # BLD AUTO: 313 THOUSANDS/UL (ref 149–390)
PMV BLD AUTO: 9.1 FL (ref 8.9–12.7)
RBC # BLD AUTO: 4.64 MILLION/UL (ref 3.81–5.12)
WBC # BLD AUTO: 10.05 THOUSAND/UL (ref 4.31–10.16)

## 2022-04-19 PROCEDURE — 36415 COLL VENOUS BLD VENIPUNCTURE: CPT | Performed by: EMERGENCY MEDICINE

## 2022-04-19 PROCEDURE — 96374 THER/PROPH/DIAG INJ IV PUSH: CPT

## 2022-04-19 PROCEDURE — 99282 EMERGENCY DEPT VISIT SF MDM: CPT

## 2022-04-19 PROCEDURE — 99283 EMERGENCY DEPT VISIT LOW MDM: CPT

## 2022-04-19 PROCEDURE — 85025 COMPLETE CBC W/AUTO DIFF WBC: CPT | Performed by: EMERGENCY MEDICINE

## 2022-04-19 PROCEDURE — 96361 HYDRATE IV INFUSION ADD-ON: CPT

## 2022-04-19 RX ORDER — ONDANSETRON 2 MG/ML
4 INJECTION INTRAMUSCULAR; INTRAVENOUS ONCE
Status: COMPLETED | OUTPATIENT
Start: 2022-04-19 | End: 2022-04-19

## 2022-04-19 RX ADMIN — SODIUM CHLORIDE 500 ML: 0.9 INJECTION, SOLUTION INTRAVENOUS at 17:07

## 2022-04-19 RX ADMIN — ONDANSETRON 4 MG: 2 INJECTION INTRAMUSCULAR; INTRAVENOUS at 17:11

## 2022-04-19 NOTE — ED PROVIDER NOTES
History  Chief Complaint   Patient presents with    Allergic Reaction     feels like her throat is closing  gums hurting    Vomiting     2 episodes     58year old female presents to the ED for concerns of allergic reaction to some soup that she ate  She states that about an hour ago, she was having soup with her son and when she was finished, she felt "off " She initially felt that her throat was closing up and some abdominal discomfort  Her son drove her to the ED, but she needed to pull over to vomit  She had one episode of vomiting her stomach contents, and since then, she has not had any symptoms  She is here in the ED, talking and breathing without difficulty  Vitals are stable  At this time, she denies chest tightness, fever, chills, nausea, shortness of breath, cough, rash, swelling  She is currently being worked up for stage 3 breast carcinoma, but has not started any treatment  History provided by:  Patient   used: No    Allergic Reaction  Presenting symptoms: swelling    Presenting symptoms: no difficulty breathing, no difficulty swallowing, no itching, no rash and no wheezing    Swelling:     Location:  Mouth    Onset quality:  Sudden    Duration:  1 hour    Progression:  Resolved    Chronicity:  New  Severity:  Mild  Prior allergic episodes: Allergies to medications  Context: food    Relieved by: vomiting  Ineffective treatments:  None tried  Vomiting  Severity:  Mild  Number of daily episodes:  1  Quality:  Stomach contents  Progression:  Resolved  Chronicity:  New  Associated symptoms: no abdominal pain, no arthralgias, no chills, no cough, no diarrhea, no fever, no headaches, no myalgias and no sore throat        Prior to Admission Medications   Prescriptions Last Dose Informant Patient Reported? Taking?    HYDROcodone-acetaminophen (Norco) 5-325 mg per tablet   No No   Sig: Take 1 tablet by mouth every 6 (six) hours as needed for pain Max Daily Amount: 4 tablets ibuprofen (MOTRIN) 800 mg tablet   No No   Sig: Take 1 tablet (800 mg total) by mouth every 8 (eight) hours as needed for mild pain   Patient not taking: Reported on 1/12/2022    levothyroxine 88 mcg tablet   Yes No   Sig: levothyroxine sodium 88 mcg tabs   methylPREDNISolone 4 MG tablet therapy pack   No No   Sig: Use as directed on package   Patient not taking: Reported on 1/12/2022    torsemide (DEMADEX) 10 mg tablet   Yes No   Sig: Take 10 mg by mouth daily      Facility-Administered Medications: None       Past Medical History:   Diagnosis Date    Hypothyroidism     Hypothyroidism     Kidney stones        Past Surgical History:   Procedure Laterality Date    TUBAL LIGATION         History reviewed  No pertinent family history  I have reviewed and agree with the history as documented  E-Cigarette/Vaping    E-Cigarette Use Never User      E-Cigarette/Vaping Substances     Social History     Tobacco Use    Smoking status: Never Smoker    Smokeless tobacco: Never Used   Vaping Use    Vaping Use: Never used   Substance Use Topics    Alcohol use: Not Currently    Drug use: Never       Review of Systems   Constitutional: Negative for chills and fever  HENT: Negative for drooling, ear pain, sore throat and trouble swallowing  Eyes: Negative for pain and visual disturbance  Respiratory: Negative for cough, chest tightness, shortness of breath and wheezing  Cardiovascular: Negative for chest pain and palpitations  Gastrointestinal: Positive for vomiting  Negative for abdominal pain, constipation, diarrhea and nausea  Genitourinary: Negative for dysuria and hematuria  Musculoskeletal: Negative for arthralgias, back pain, myalgias and neck pain  Skin: Negative for color change, itching and rash  Neurological: Negative for dizziness, seizures, syncope, weakness, light-headedness, numbness and headaches  Psychiatric/Behavioral: Negative for confusion     All other systems reviewed and are negative  Physical Exam  Physical Exam  Vitals and nursing note reviewed  Constitutional:       General: She is not in acute distress  Appearance: Normal appearance  She is well-developed  HENT:      Head: Normocephalic and atraumatic  Right Ear: External ear normal       Left Ear: External ear normal       Nose: Nose normal       Mouth/Throat:      Mouth: Mucous membranes are moist       Pharynx: Oropharynx is clear  No oropharyngeal exudate or posterior oropharyngeal erythema  Comments: No evidence of oropharygeal swelling  Eyes:      General: No scleral icterus  Right eye: No discharge  Left eye: No discharge  Conjunctiva/sclera: Conjunctivae normal    Cardiovascular:      Rate and Rhythm: Normal rate  Pulses: Normal pulses  Heart sounds: Normal heart sounds  No murmur heard  Pulmonary:      Effort: Pulmonary effort is normal  No respiratory distress  Breath sounds: Normal breath sounds  No stridor  No wheezing  Abdominal:      General: Abdomen is flat  Palpations: Abdomen is soft  Tenderness: There is no abdominal tenderness  Musculoskeletal:         General: Normal range of motion  Cervical back: Normal range of motion and neck supple  Skin:     General: Skin is warm and dry  Findings: No rash  Neurological:      General: No focal deficit present  Mental Status: She is alert and oriented to person, place, and time  Mental status is at baseline  Psychiatric:         Mood and Affect: Mood normal          Behavior: Behavior normal          Thought Content:  Thought content normal          Vital Signs  ED Triage Vitals   Temperature Pulse Respirations Blood Pressure SpO2   04/19/22 1751 04/19/22 1652 04/19/22 1652 04/19/22 1652 04/19/22 1652   98 5 °F (36 9 °C) 90 19 168/98 97 %      Temp Source Heart Rate Source Patient Position - Orthostatic VS BP Location FiO2 (%)   04/19/22 1751 04/19/22 1652 04/19/22 1652 04/19/22 1652 --   Tympanic Monitor Sitting Left arm       Pain Score       --                  Vitals:    04/19/22 1652 04/19/22 1800   BP: 168/98 150/79   Pulse: 90 84   Patient Position - Orthostatic VS: Sitting Lying         Visual Acuity      ED Medications  Medications   sodium chloride 0 9 % bolus 500 mL (0 mL Intravenous Stopped 4/19/22 1833)   ondansetron (ZOFRAN) injection 4 mg (4 mg Intravenous Given 4/19/22 1711)       Diagnostic Studies  Results Reviewed     Procedure Component Value Units Date/Time    Comprehensive metabolic panel [[id number]] Updated: 04/19/22 1807    Lab Status: No result Specimen: Blood from Hand, Right     CBC and differential [[id number]] Collected: 04/19/22 1706    Lab Status: Final result Specimen: Blood from Hand, Right Updated: 04/19/22 1715     WBC 10 05 Thousand/uL      RBC 4 64 Million/uL      Hemoglobin 14 2 g/dL      Hematocrit 43 4 %      MCV 94 fL      MCH 30 6 pg      MCHC 32 7 g/dL      RDW 13 3 %      MPV 9 1 fL      Platelets 592 Thousands/uL      nRBC 0 /100 WBCs      Neutrophils Relative 75 %      Immat GRANS % 0 %      Lymphocytes Relative 16 %      Monocytes Relative 7 %      Eosinophils Relative 2 %      Basophils Relative 0 %      Neutrophils Absolute 7 53 Thousands/µL      Immature Grans Absolute 0 04 Thousand/uL      Lymphocytes Absolute 1 61 Thousands/µL      Monocytes Absolute 0 68 Thousand/µL      Eosinophils Absolute 0 15 Thousand/µL      Basophils Absolute 0 04 Thousands/µL                  No orders to display              Procedures  Procedures         ED Course  ED Course as of 04/19/22 1840   Tue Apr 19, 2022   4898 On reassessment, pt is doing well  No complaints, she is requesting to go home  I explained to her that we are waiting on the rest of her bloodwork to be resulted and that leaving while blood work is pending would be against medical advice and could increase her risk of worsening condition or death     Patient verbalizes understanding and is willing to wait  SBIRT 20yo+      Most Recent Value   SBIRT (22 yo +)    In order to provide better care to our patients, we are screening all of our patients for alcohol and drug use  Would it be okay to ask you these screening questions? Yes Filed at: 04/19/2022 1715   Initial Alcohol Screen: US AUDIT-C     1  How often do you have a drink containing alcohol? 0 Filed at: 04/19/2022 1715   2  How many drinks containing alcohol do you have on a typical day you are drinking? 0 Filed at: 04/19/2022 1715   3b  FEMALE Any Age, or MALE 65+: How often do you have 4 or more drinks on one occassion? 0 Filed at: 04/19/2022 1715   Audit-C Score 0 Filed at: 04/19/2022 1715   HERNESTO: How many times in the past year have you    Used an illegal drug or used a prescription medication for non-medical reasons? Never Filed at: 04/19/2022 1715                    MDM  Number of Diagnoses or Management Options  Vomiting: new and requires workup  Diagnosis management comments: 58year old female presenting to the ED with one episode of vomiting and throat discomfort after eating soup concerning for anaphylaxis or allergic reaction vs food poisoning  Patient's symptoms have subsided since her episode of vomiting  No evidence of oropharyngeal swelling, stridor, wheezing, rash, hives, or difficulty breathing noted on exam  Low clinical suspicion for anaphylaxis at this time  Vitals are stable  Will hold epi for now  Ordered basic labs and will continue to monitor for symptoms  The blood for the CMP was redrawn and hemolyzed twice  After several re-evaluations, patient is clinically doing well  Vitals are stable and has not had any bouts of nausea, vomiting, rash, swelling, or itching  Patient requesting to go home  I suspect that she likely had an acute reaction to the soup that she ate  With her absence of symptoms and no findings on exam, I am comfortable sending her home   I informed her of the risks of leaving without the full evaluation and she is understanding of those risks  I advised her to return to the ED if significant changes or worsening symptoms develop  I instructed her to follow up with her family doctor for further evaluation  Patient verbalizes her understanding of the assessment and plan  She was discharged in stable condition  Amount and/or Complexity of Data Reviewed  Clinical lab tests: ordered and reviewed  Discuss the patient with other providers: yes    Patient Progress  Patient progress: stable      Disposition  Final diagnoses:   Vomiting     Time reflects when diagnosis was documented in both MDM as applicable and the Disposition within this note     Time User Action Codes Description Comment    4/19/2022  6:26 PM Charlie Robertson Add [R11 10] Vomiting       ED Disposition     ED Disposition Condition Date/Time Comment    Discharge Stable Tue Apr 19, 2022  6:26 PM Ted Squires discharge to home/self care              Follow-up Information     Follow up With Specialties Details Why Contact Info Additional Information    Erica Fitzgerald DO Family Medicine Call in 3 days follow up for further evaluation of symptoms [address] [phone]  [address] [facility] Emergency Department Emergency Medicine Go to  If symptoms worsen [address]  Anam Colindres  [facility] [id number]  [facility] Emergency Department, [address] [place] [address]          Discharge Medication List as of 4/19/2022  6:27 PM      CONTINUE these medications which have NOT CHANGED    Details   HYDROcodone-acetaminophen (Norco) 5-325 mg per tablet Take 1 tablet by mouth every 6 (six) hours as needed for pain Max Daily Amount: 4 tablets, Starting Wed 12/22/2021, Normal      ibuprofen (MOTRIN) 800 mg tablet Take 1 tablet (800 mg total) by mouth every 8 (eight) hours as needed for mild pain, Starting Wed 12/22/2021, Normal      levothyroxine 88 mcg tablet levothyroxine sodium 88 mcg tabs, Historical Med      methylPREDNISolone 4 MG tablet therapy pack Use as directed on package, Normal      torsemide (DEMADEX) 10 mg tablet Take 10 mg by mouth daily, Starting Thu 1/6/2022, Until Fri 1/6/2023, Historical Med             No discharge procedures on file      PDMP Review       Value Time User    PDMP Reviewed  Yes 12/22/2021  2:31 PM Solange Hatfield MD          ED Provider  Electronically Signed by           Shiv Gamez PA-C  04/19/22 3295

## 2023-05-22 ENCOUNTER — APPOINTMENT (EMERGENCY)
Dept: RADIOLOGY | Facility: HOSPITAL | Age: 63
End: 2023-05-22

## 2023-05-22 ENCOUNTER — HOSPITAL ENCOUNTER (EMERGENCY)
Facility: HOSPITAL | Age: 63
Discharge: HOME | End: 2023-05-22
Attending: EMERGENCY MEDICINE

## 2023-05-22 VITALS
TEMPERATURE: 97.7 F | OXYGEN SATURATION: 94 % | SYSTOLIC BLOOD PRESSURE: 136 MMHG | RESPIRATION RATE: 20 BRPM | HEART RATE: 88 BPM | DIASTOLIC BLOOD PRESSURE: 63 MMHG

## 2023-05-22 DIAGNOSIS — M79.662 PAIN OF LEFT CALF: Primary | ICD-10-CM

## 2023-05-22 LAB
ANION GAP SERPL CALCULATED.3IONS-SCNC: 10 MMOL/L (ref 4–13)
BASOPHILS # BLD AUTO: 0.06 THOUSANDS/ÂΜL (ref 0–0.1)
BASOPHILS NFR BLD AUTO: 1 % (ref 0–1)
BUN SERPL-MCNC: 13 MG/DL (ref 5–25)
CALCIUM SERPL-MCNC: 9.5 MG/DL (ref 8.4–10.2)
CHLORIDE SERPL-SCNC: 103 MMOL/L (ref 96–108)
CO2 SERPL-SCNC: 26 MMOL/L (ref 21–32)
CREAT SERPL-MCNC: 0.97 MG/DL (ref 0.6–1.3)
D DIMER PPP FEU-MCNC: 0.58 UG/ML FEU
EOSINOPHIL # BLD AUTO: 0.15 THOUSAND/ÂΜL (ref 0–0.61)
EOSINOPHIL NFR BLD AUTO: 2 % (ref 0–6)
ERYTHROCYTE [DISTWIDTH] IN BLOOD BY AUTOMATED COUNT: 13 % (ref 11.6–15.1)
GFR SERPL CREATININE-BSD FRML MDRD: 62 ML/MIN/1.73SQ M
GLUCOSE SERPL-MCNC: 102 MG/DL (ref 65–140)
HCT VFR BLD AUTO: 35 % (ref 34.8–46.1)
HGB BLD-MCNC: 11.7 G/DL (ref 11.5–15.4)
IMM GRANULOCYTES # BLD AUTO: 0.01 THOUSAND/UL (ref 0–0.2)
IMM GRANULOCYTES NFR BLD AUTO: 0 % (ref 0–2)
LYMPHOCYTES # BLD AUTO: 1.44 THOUSANDS/ÂΜL (ref 0.6–4.47)
LYMPHOCYTES NFR BLD AUTO: 23 % (ref 14–44)
MCH RBC QN AUTO: 36.2 PG (ref 26.8–34.3)
MCHC RBC AUTO-ENTMCNC: 33.4 G/DL (ref 31.4–37.4)
MCV RBC AUTO: 108 FL (ref 82–98)
MONOCYTES # BLD AUTO: 0.52 THOUSAND/ÂΜL (ref 0.17–1.22)
MONOCYTES NFR BLD AUTO: 8 % (ref 4–12)
NEUTROPHILS # BLD AUTO: 4.11 THOUSANDS/ÂΜL (ref 1.85–7.62)
NEUTS SEG NFR BLD AUTO: 66 % (ref 43–75)
NRBC BLD AUTO-RTO: 0 /100 WBCS
PLATELET # BLD AUTO: 232 THOUSANDS/UL (ref 149–390)
PMV BLD AUTO: 8.7 FL (ref 8.9–12.7)
POTASSIUM SERPL-SCNC: 3.9 MMOL/L (ref 3.5–5.3)
RBC # BLD AUTO: 3.23 MILLION/UL (ref 3.81–5.12)
SODIUM SERPL-SCNC: 139 MMOL/L (ref 135–147)
WBC # BLD AUTO: 6.29 THOUSAND/UL (ref 4.31–10.16)

## 2023-05-22 RX ORDER — CEPHALEXIN 500 MG/1
500 CAPSULE ORAL ONCE
Status: COMPLETED | OUTPATIENT
Start: 2023-05-22 | End: 2023-05-22

## 2023-05-22 RX ORDER — CEPHALEXIN 500 MG/1
500 CAPSULE ORAL EVERY 6 HOURS SCHEDULED
Qty: 28 CAPSULE | Refills: 0 | Status: SHIPPED | OUTPATIENT
Start: 2023-05-22 | End: 2023-05-29

## 2023-05-22 RX ORDER — KETOROLAC TROMETHAMINE 30 MG/ML
15 INJECTION, SOLUTION INTRAMUSCULAR; INTRAVENOUS ONCE
Status: COMPLETED | OUTPATIENT
Start: 2023-05-22 | End: 2023-05-22

## 2023-05-22 RX ADMIN — CEPHALEXIN 500 MG: 500 CAPSULE ORAL at 20:52

## 2023-05-22 RX ADMIN — KETOROLAC TROMETHAMINE 15 MG: 30 INJECTION, SOLUTION INTRAMUSCULAR at 19:46

## 2023-05-23 NOTE — DISCHARGE INSTRUCTIONS
-Use Tylenol 1000 mg 3 times a day and ibuprofen 8 or milligrams 3 times a day to help with pain  Also use compression stockings  Please return to care tomorrow to get the ultrasound done to confirm that there is no blood clot

## 2023-05-24 ENCOUNTER — HOSPITAL ENCOUNTER (OUTPATIENT)
Dept: NON INVASIVE DIAGNOSTICS | Facility: HOSPITAL | Age: 63
Discharge: HOME | End: 2023-05-24
Attending: EMERGENCY MEDICINE

## 2023-05-24 DIAGNOSIS — M79.662 PAIN OF LEFT CALF: ICD-10-CM

## 2023-05-25 NOTE — ED PROVIDER NOTES
"History  Chief Complaint   Patient presents with   • Leg Pain     Patient reports two nights ago she felt a \"lump\" behind her knee after walking  Patient reports she was walking around at a fair yesterday and reports increased pain  Patient reports no relief with a heating pad, ice  Patient reports after walking today she reported increased pain  Patient reports heat at the area as well  Patient is a 70-year-old female with a history of breast cancer that presents for evaluation of left calf pain  Patient says 2 nights ago she felt like there is some swelling behind her left knee  She says that she felt like this swelling dissipated and is now spread down to her left calf  She describes a sharp stabbing moderate pain  Still is ambulatory despite the pain  No known history of PE or DVT  She is not taking thing for symptoms  She denies chest pain dyspnea abdominal pain associate with her symptoms  Prior to Admission Medications   Prescriptions Last Dose Informant Patient Reported? Taking? HYDROcodone-acetaminophen (Norco) 5-325 mg per tablet   No No   Sig: Take 1 tablet by mouth every 6 (six) hours as needed for pain Max Daily Amount: 4 tablets   ibuprofen (MOTRIN) 800 mg tablet   No No   Sig: Take 1 tablet (800 mg total) by mouth every 8 (eight) hours as needed for mild pain   Patient not taking: Reported on 1/12/2022    levothyroxine 88 mcg tablet   Yes No   Sig: levothyroxine sodium 88 mcg tabs   methylPREDNISolone 4 MG tablet therapy pack   No No   Sig: Use as directed on package   Patient not taking: Reported on 1/12/2022    torsemide (DEMADEX) 10 mg tablet   Yes No   Sig: Take 10 mg by mouth daily      Facility-Administered Medications: None       Past Medical History:   Diagnosis Date   • [facility]    • Hypothyroidism    • Hypothyroidism    • Kidney stones        Past Surgical History:   Procedure Laterality Date   • TUBAL LIGATION         History reviewed   No pertinent family " history  I have reviewed and agree with the history as documented  E-Cigarette/Vaping   • E-Cigarette Use Never User      E-Cigarette/Vaping Substances     Social History     Tobacco Use   • Smoking status: Never   • Smokeless tobacco: Never   Vaping Use   • Vaping Use: Never used   Substance Use Topics   • Alcohol use: Not Currently   • Drug use: Never       Review of Systems   Constitutional: Negative for fever  HENT: Negative for sore throat  Respiratory: Negative for shortness of breath  Cardiovascular: Negative for chest pain  Gastrointestinal: Negative for abdominal pain  Genitourinary: Negative for dysuria  Musculoskeletal: Negative for back pain  Left calf pain   Skin: Negative for rash  Neurological: Negative for light-headedness  Psychiatric/Behavioral: Negative for agitation  All other systems reviewed and are negative  Physical Exam  Physical Exam  Vitals reviewed  Constitutional:       General: She is not in acute distress  Appearance: She is well-developed  HENT:      Head: Normocephalic  Eyes:      Pupils: Pupils are equal, round, and reactive to light  Cardiovascular:      Rate and Rhythm: Normal rate and regular rhythm  Heart sounds: Normal heart sounds  Pulmonary:      Effort: Pulmonary effort is normal       Breath sounds: Normal breath sounds  Abdominal:      General: Bowel sounds are normal  There is no distension  Palpations: Abdomen is soft  Tenderness: There is no abdominal tenderness  There is no guarding  Musculoskeletal:         General: No tenderness or deformity  Normal range of motion  Cervical back: Normal range of motion and neck supple  Comments: Mild swelling and tenderness of the left calf  There is some erythema and warmth in the posterior left calf and knee  No bony tenderness of the knee and no ligamentous laxity on exam   Distally neurovascular intact   Skin:     General: Skin is warm and dry  Capillary Refill: Capillary refill takes less than 2 seconds  Neurological:      Mental Status: She is alert and oriented to person, place, and time  Cranial Nerves: No cranial nerve deficit  Sensory: No sensory deficit  Psychiatric:         Behavior: Behavior normal          Thought Content:  Thought content normal          Judgment: Judgment normal          Vital Signs  ED Triage Vitals [05/22/23 1907]   Temperature Pulse Respirations Blood Pressure SpO2   97 7 °F (36 5 °C) 98 18 164/70 97 %      Temp Source Heart Rate Source Patient Position - Orthostatic VS BP Location FiO2 (%)   Temporal -- Sitting Left arm --      Pain Score       10 - Worst Possible Pain           Vitals:    05/22/23 1907 05/22/23 2055   BP: 164/70 136/63   Pulse: 98 88   Patient Position - Orthostatic VS: Sitting          Visual Acuity      ED Medications  Medications   ketorolac (TORADOL) injection 15 mg (15 mg Intravenous Given 5/22/23 1946)   cephalexin (KEFLEX) capsule 500 mg (500 mg Oral Given 5/22/23 2052)       Diagnostic Studies  Results Reviewed     Procedure Component Value Units Date/Time    Basic metabolic panel [[id number]] Collected: 05/22/23 1943    Lab Status: Final result Specimen: Blood from Arm, Right Updated: 05/22/23 2013     Sodium 139 mmol/L      Potassium 3 9 mmol/L      Chloride 103 mmol/L      CO2 26 mmol/L      ANION GAP 10 mmol/L      BUN 13 mg/dL      Creatinine 0 97 mg/dL      Glucose 102 mg/dL      Calcium 9 5 mg/dL      eGFR 62 ml/min/1 73sq m     Narrative:      [place] guidelines for Chronic Kidney Disease (CKD):   •  Stage 1 with normal or high GFR (GFR > 90 mL/min/1 73 square meters)  •  Stage 2 Mild CKD (GFR = 60-89 mL/min/1 73 square meters)  •  Stage 3A Moderate CKD (GFR = 45-59 mL/min/1 73 square meters)  •  Stage 3B Moderate CKD (GFR = 30-44 mL/min/1 73 square meters)  •  Stage 4 Severe CKD (GFR = 15-29 mL/min/1 73 square meters)  •  Stage 5 End Stage CKD (GFR <15 mL/min/1 73 square meters)  Note: GFR calculation is accurate only with a steady state creatinine    D-dimer, quantitative [[id number]]  (Abnormal) Collected: 05/22/23 1943    Lab Status: Final result Specimen: Blood from Arm, Right Updated: 05/22/23 2012     D-Dimer, Quant 0 58 ug/ml FEU     Narrative: In the evaluation for possible pulmonary embolism, in the appropriate (Well's Score of 4 or less) patient, the age adjusted d-dimer cutoff for this patient can be calculated as:    Age x 0 01 (in ug/mL) for Age-adjusted D-dimer exclusion threshold for a patient over 50 years  CBC and differential [[id number]]  (Abnormal) Collected: 05/22/23 1943    Lab Status: Final result Specimen: Blood from Arm, Right Updated: 05/22/23 1948     WBC 6 29 Thousand/uL      RBC 3 23 Million/uL      Hemoglobin 11 7 g/dL      Hematocrit 35 0 %       fL      MCH 36 2 pg      MCHC 33 4 g/dL      RDW 13 0 %      MPV 8 7 fL      Platelets 747 Thousands/uL      nRBC 0 /100 WBCs      Neutrophils Relative 66 %      Immat GRANS % 0 %      Lymphocytes Relative 23 %      Monocytes Relative 8 %      Eosinophils Relative 2 %      Basophils Relative 1 %      Neutrophils Absolute 4 11 Thousands/µL      Immature Grans Absolute 0 01 Thousand/uL      Lymphocytes Absolute 1 44 Thousands/µL      Monocytes Absolute 0 52 Thousand/µL      Eosinophils Absolute 0 15 Thousand/µL      Basophils Absolute 0 06 Thousands/µL                  XR knee 4+ vw left injury   Final Result by Shankar Sandhu MD (05/23 1211)      No acute osseous abnormality  Tricompartmental degenerative changes most pronounced in the medial compartment  Workstation performed: [id number]                    Procedures  Procedures         ED Course                               SBIRT 22yo+    Flowsheet Row Most Recent Value   Initial Alcohol Screen: US AUDIT-C     1  How often do you have a drink containing alcohol? 0 Filed at: 05/22/2023 1909   2   How many drinks containing alcohol do you have on a typical day you are drinking? 0 Filed at: 05/22/2023 1909   3a  Male UNDER 65: How often do you have five or more drinks on one occasion? 0 Filed at: 05/22/2023 1909   3b  FEMALE Any Age, or MALE 65+: How often do you have 4 or more drinks on one occassion? 0 Filed at: 05/22/2023 1909   Audit-C Score 0 Filed at: 05/22/2023 1909   HERNESTO: How many times in the past year have you    Used an illegal drug or used a prescription medication for non-medical reasons? Never Filed at: 05/22/2023 [address] [place]  Patient is a 60-year-old female presents for evaluation of left calf pain and swelling  There is concern for cellulitis versus DVT  D-dimer however negative  Despite this, I still order vascular duplex outpatient to confirm that the patient does not have DVT  Otherwise, clinically consistent with possible cellulitis  Treated with antibiotics and anti-inflammatories with instructions to follow-up  Amount and/or Complexity of Data Reviewed  Labs: ordered  Radiology: ordered  Risk  OTC drugs  Prescription drug management  Disposition  Final diagnoses:   Pain of left calf     Time reflects when diagnosis was documented in both MDM as applicable and the Disposition within this note     Time User Action Codes Description Comment    5/22/2023  8:32 PM Zelda Quinteros Add [J03 [id number]] Pain of left calf       ED Disposition     ED Disposition   Discharge    Condition   Stable    Date/Time   Mon May 22, 2023  8:25 PM    Comment   Lambert Alonso discharge to home/self care                 Follow-up Information     Follow up With Specialties Details Why Contact Info Additional [address] [place]  Emergency Department Emergency Medicine  If symptoms worsen [address]  [place] [address]  [phone] [facility], [address] [place] [address] , [facility]/[employer], [place] [address]          Discharge Medication List as of 5/22/2023  8:34 PM      CONTINUE these medications which have NOT CHANGED    Details   HYDROcodone-acetaminophen (Norco) 5-325 mg per tablet Take 1 tablet by mouth every 6 (six) hours as needed for pain Max Daily Amount: 4 tablets, Starting Wed 12/22/2021, Normal      ibuprofen (MOTRIN) 800 mg tablet Take 1 tablet (800 mg total) by mouth every 8 (eight) hours as needed for mild pain, Starting Wed 12/22/2021, Normal      levothyroxine 88 mcg tablet levothyroxine sodium 88 mcg tabs, Historical Med      methylPREDNISolone 4 MG tablet therapy pack Use as directed on package, Normal      torsemide (DEMADEX) 10 mg tablet Take 10 mg by mouth daily, Starting Thu 1/6/2022, Until Fri 1/6/2023, Historical Med             Outpatient Discharge Orders   VAS lower limb venous duplex study, unilateral/limited   Standing Status: Future Number of Occurrences: 1 Standing Exp   Date: 05/22/27       PDMP Review       Value Time User    PDMP Reviewed  Yes 12/22/2021  2:31 PM Usman Tijerina MD          ED Provider  Electronically Signed by           Bessie Maya MD  05/25/23 6704

## 2023-09-18 ENCOUNTER — APPOINTMENT (EMERGENCY)
Dept: RADIOLOGY | Facility: HOSPITAL | Age: 63
End: 2023-09-18
Payer: COMMERCIAL

## 2023-09-18 ENCOUNTER — HOSPITAL ENCOUNTER (EMERGENCY)
Facility: HOSPITAL | Age: 63
Discharge: HOME | End: 2023-09-18
Attending: EMERGENCY MEDICINE
Payer: COMMERCIAL

## 2023-09-18 VITALS
DIASTOLIC BLOOD PRESSURE: 65 MMHG | WEIGHT: 211 LBS | TEMPERATURE: 97.8 F | RESPIRATION RATE: 18 BRPM | SYSTOLIC BLOOD PRESSURE: 114 MMHG | OXYGEN SATURATION: 96 % | BODY MASS INDEX: 37.38 KG/M2 | HEART RATE: 89 BPM

## 2023-09-18 DIAGNOSIS — U07.1 COVID-19: Primary | ICD-10-CM

## 2023-09-18 DIAGNOSIS — B35.4 TINEA CORPORIS: ICD-10-CM

## 2023-09-18 DIAGNOSIS — L03.115 CELLULITIS OF RIGHT LEG: ICD-10-CM

## 2023-09-18 LAB
ANION GAP SERPL CALCULATED.3IONS-SCNC: 9 MMOL/L
BASOPHILS # BLD AUTO: 0.02 THOUSANDS/ÂΜL (ref 0–0.1)
BASOPHILS NFR BLD AUTO: 1 % (ref 0–1)
BUN SERPL-MCNC: 11 MG/DL (ref 5–25)
CALCIUM SERPL-MCNC: 8.9 MG/DL (ref 8.4–10.2)
CHLORIDE SERPL-SCNC: 102 MMOL/L (ref 96–108)
CO2 SERPL-SCNC: 28 MMOL/L (ref 21–32)
CREAT SERPL-MCNC: 0.86 MG/DL (ref 0.6–1.3)
EOSINOPHIL # BLD AUTO: 0.03 THOUSAND/ÂΜL (ref 0–0.61)
EOSINOPHIL NFR BLD AUTO: 1 % (ref 0–6)
ERYTHROCYTE [DISTWIDTH] IN BLOOD BY AUTOMATED COUNT: 12.5 % (ref 11.6–15.1)
FLUAV RNA RESP QL NAA+PROBE: NEGATIVE
FLUBV RNA RESP QL NAA+PROBE: NEGATIVE
GFR SERPL CREATININE-BSD FRML MDRD: 72 ML/MIN/1.73SQ M
GLUCOSE SERPL-MCNC: 123 MG/DL (ref 65–140)
HCT VFR BLD AUTO: 35.8 % (ref 34.8–46.1)
HGB BLD-MCNC: 12.2 G/DL (ref 11.5–15.4)
IMM GRANULOCYTES # BLD AUTO: 0.01 THOUSAND/UL (ref 0–0.2)
IMM GRANULOCYTES NFR BLD AUTO: 0 % (ref 0–2)
LYMPHOCYTES # BLD AUTO: 0.91 THOUSANDS/ÂΜL (ref 0.6–4.47)
LYMPHOCYTES NFR BLD AUTO: 28 % (ref 14–44)
MCH RBC QN AUTO: 37.3 PG (ref 26.8–34.3)
MCHC RBC AUTO-ENTMCNC: 34.1 G/DL (ref 31.4–37.4)
MCV RBC AUTO: 110 FL (ref 82–98)
MONOCYTES # BLD AUTO: 0.22 THOUSAND/ÂΜL (ref 0.17–1.22)
MONOCYTES NFR BLD AUTO: 7 % (ref 4–12)
NEUTROPHILS # BLD AUTO: 2.08 THOUSANDS/ÂΜL (ref 1.85–7.62)
NEUTS SEG NFR BLD AUTO: 63 % (ref 43–75)
NRBC BLD AUTO-RTO: 0 /100 WBCS
PLATELET # BLD AUTO: 162 THOUSANDS/UL (ref 149–390)
PMV BLD AUTO: 9.4 FL (ref 8.9–12.7)
POTASSIUM SERPL-SCNC: 3.6 MMOL/L (ref 3.5–5.3)
RBC # BLD AUTO: 3.27 MILLION/UL (ref 3.81–5.12)
RSV RNA RESP QL NAA+PROBE: NEGATIVE
SARS-COV-2 RNA RESP QL NAA+PROBE: POSITIVE
SODIUM SERPL-SCNC: 139 MMOL/L (ref 135–147)
WBC # BLD AUTO: 3.27 THOUSAND/UL (ref 4.31–10.16)

## 2023-09-18 PROCEDURE — 0241U HB NFCT DS VIR RESP RNA 4 TRGT: CPT | Performed by: EMERGENCY MEDICINE

## 2023-09-18 PROCEDURE — 99284 EMERGENCY DEPT VISIT MOD MDM: CPT | Performed by: EMERGENCY MEDICINE

## 2023-09-18 PROCEDURE — 94640 AIRWAY INHALATION TREATMENT: CPT

## 2023-09-18 PROCEDURE — 93005 ELECTROCARDIOGRAM TRACING: CPT

## 2023-09-18 PROCEDURE — 71045 X-RAY EXAM CHEST 1 VIEW: CPT

## 2023-09-18 PROCEDURE — 85025 COMPLETE CBC W/AUTO DIFF WBC: CPT | Performed by: EMERGENCY MEDICINE

## 2023-09-18 PROCEDURE — 36415 COLL VENOUS BLD VENIPUNCTURE: CPT | Performed by: EMERGENCY MEDICINE

## 2023-09-18 PROCEDURE — 99285 EMERGENCY DEPT VISIT HI MDM: CPT

## 2023-09-18 PROCEDURE — 96374 THER/PROPH/DIAG INJ IV PUSH: CPT

## 2023-09-18 PROCEDURE — 80048 BASIC METABOLIC PNL TOTAL CA: CPT | Performed by: EMERGENCY MEDICINE

## 2023-09-18 RX ORDER — CEPHALEXIN 500 MG/1
500 CAPSULE ORAL ONCE
Status: COMPLETED | OUTPATIENT
Start: 2023-09-18 | End: 2023-09-18

## 2023-09-18 RX ORDER — IPRATROPIUM BROMIDE AND ALBUTEROL SULFATE 2.5; .5 MG/3ML; MG/3ML
3 SOLUTION RESPIRATORY (INHALATION) ONCE
Status: COMPLETED | OUTPATIENT
Start: 2023-09-18 | End: 2023-09-18

## 2023-09-18 RX ORDER — BENZONATATE 100 MG/1
100 CAPSULE ORAL EVERY 8 HOURS
Qty: 21 CAPSULE | Refills: 0 | Status: SHIPPED | OUTPATIENT
Start: 2023-09-18

## 2023-09-18 RX ORDER — CEPHALEXIN 500 MG/1
500 CAPSULE ORAL EVERY 6 HOURS SCHEDULED
Qty: 28 CAPSULE | Refills: 0 | Status: SHIPPED | OUTPATIENT
Start: 2023-09-18 | End: 2023-09-25

## 2023-09-18 RX ORDER — CLOTRIMAZOLE 1 %
CREAM (GRAM) TOPICAL
Qty: 15 G | Refills: 0 | Status: SHIPPED | OUTPATIENT
Start: 2023-09-18

## 2023-09-18 RX ORDER — GINSENG 100 MG
1 CAPSULE ORAL ONCE
Status: COMPLETED | OUTPATIENT
Start: 2023-09-18 | End: 2023-09-18

## 2023-09-18 RX ORDER — KETOROLAC TROMETHAMINE 30 MG/ML
15 INJECTION, SOLUTION INTRAMUSCULAR; INTRAVENOUS ONCE
Status: COMPLETED | OUTPATIENT
Start: 2023-09-18 | End: 2023-09-18

## 2023-09-18 RX ADMIN — CEPHALEXIN 500 MG: 500 CAPSULE ORAL at 13:02

## 2023-09-18 RX ADMIN — KETOROLAC TROMETHAMINE 15 MG: 30 INJECTION, SOLUTION INTRAMUSCULAR; INTRAVENOUS at 13:03

## 2023-09-18 RX ADMIN — IPRATROPIUM BROMIDE AND ALBUTEROL SULFATE 3 ML: .5; 3 SOLUTION RESPIRATORY (INHALATION) at 13:02

## 2023-09-18 RX ADMIN — BACITRACIN ZINC 1 SMALL APPLICATION: 500 OINTMENT TOPICAL at 13:02

## 2023-09-22 LAB
ATRIAL RATE: 82 BPM
P AXIS: 50 DEGREES
PR INTERVAL: 148 MS
QRS AXIS: -32 DEGREES
QRSD INTERVAL: 134 MS
QT INTERVAL: 388 MS
QTC INTERVAL: 453 MS
T WAVE AXIS: 28 DEGREES
VENTRICULAR RATE: 82 BPM

## 2023-09-22 PROCEDURE — 93010 ELECTROCARDIOGRAM REPORT: CPT | Performed by: INTERNAL MEDICINE

## 2023-09-24 NOTE — ED PROVIDER NOTES
History  Chief Complaint   Patient presents with   • Cough   • Wound Check     Patient is a 80-year-old female that presents for evaluation of right leg wound. Patient says that 2 to 3 days ago she suffered a scrape along her right anterior shin. She has developed some erythema swelling and warmth in this area. It is also slightly painful. No significant drainage. Patient denies fevers chills rigors. She has not been taking thing for symptoms. I have evaluated the patient previously with similar symptoms secondary to cellulitis. Patient has secondary complaint of over 1 week of cough rhinorrhea congestion wheezing and mild exertional dyspnea. Patient says that she is actually been feeling slightly better but wanted to mention it. She denies any chest pain nausea vomiting or diarrhea associate with her symptoms. Prior to Admission Medications   Prescriptions Last Dose Informant Patient Reported? Taking? HYDROcodone-acetaminophen (Norco) 5-325 mg per tablet   No No   Sig: Take 1 tablet by mouth every 6 (six) hours as needed for pain Max Daily Amount: 4 tablets   ibuprofen (MOTRIN) 800 mg tablet   No No   Sig: Take 1 tablet (800 mg total) by mouth every 8 (eight) hours as needed for mild pain   Patient not taking: Reported on 1/12/2022    levothyroxine 88 mcg tablet   Yes No   Sig: levothyroxine sodium 88 mcg tabs   methylPREDNISolone 4 MG tablet therapy pack   No No   Sig: Use as directed on package   Patient not taking: Reported on 1/12/2022    torsemide (DEMADEX) 10 mg tablet   Yes No   Sig: Take 10 mg by mouth daily      Facility-Administered Medications: None       Past Medical History:   Diagnosis Date   • Breast cancer ([address])    • Hypothyroidism    • Hypothyroidism    • Kidney stones        Past Surgical History:   Procedure Laterality Date   • TUBAL LIGATION         History reviewed. No pertinent family history.   I have reviewed and agree with the history as documented. E-Cigarette/Vaping   • E-Cigarette Use Never User      E-Cigarette/Vaping Substances     Social History     Tobacco Use   • Smoking status: Never   • Smokeless tobacco: Never   Vaping Use   • Vaping Use: Never used   Substance Use Topics   • Alcohol use: Not Currently   • Drug use: Never       Review of Systems   Constitutional: Negative for fever. HENT: Positive for congestion and rhinorrhea. Negative for sore throat. Respiratory: Positive for cough, shortness of breath and wheezing. Cardiovascular: Negative for chest pain. Gastrointestinal: Negative for abdominal pain. Genitourinary: Negative for dysuria. Musculoskeletal: Negative for back pain. Leg wound   Skin: Negative for rash. Neurological: Negative for light-headedness. Psychiatric/Behavioral: Negative for agitation. All other systems reviewed and are negative. Physical Exam  Physical Exam  Vitals reviewed. Constitutional:       General: She is not in acute distress. Appearance: She is well-developed. HENT:      Head: Normocephalic. Eyes:      Pupils: Pupils are equal, round, and reactive to light. Cardiovascular:      Rate and Rhythm: Normal rate and regular rhythm. Heart sounds: Normal heart sounds. Pulmonary:      Effort: Pulmonary effort is normal.      Comments: Mild expiratory wheezing heard throughout, no increased work of breathing. Abdominal:      General: Bowel sounds are normal. There is no distension. Palpations: Abdomen is soft. Tenderness: There is no abdominal tenderness. There is no guarding. Musculoskeletal:         General: No tenderness or deformity. Normal range of motion. Cervical back: Normal range of motion and neck supple. Comments: Patient with abrasion over the right anterior shin with associated erythema and warmth consistent with cellulitis. Distally neurovascular intact   Skin:     General: Skin is warm and dry.       Capillary Refill: Capillary refill takes less than 2 seconds. Neurological:      Mental Status: She is alert and oriented to person, place, and time. Cranial Nerves: No cranial nerve deficit. Sensory: No sensory deficit. Psychiatric:         Behavior: Behavior normal.         Thought Content: Thought content normal.         Judgment: Judgment normal.         Vital Signs  ED Triage Vitals [09/18/23 1215]   Temperature Pulse Respirations Blood Pressure SpO2   97.8 °F (36.6 °C) 89 18 114/65 96 %      Temp Source Heart Rate Source Patient Position - Orthostatic VS BP Location FiO2 (%)   Temporal Monitor -- Left arm --      Pain Score       --           Vitals:    09/18/23 1215   BP: 114/65   Pulse: 89         Visual Acuity      ED Medications  Medications   ipratropium-albuterol (DUO-NEB) 0.5-2.5 mg/3 mL inhalation solution 3 mL (3 mL Nebulization Given 9/18/23 1302)   ketorolac (TORADOL) injection 15 mg (15 mg Intravenous Given 9/18/23 1303)   bacitracin topical ointment 1 small application (1 small application Topical Given 9/18/23 1302)   cephalexin (KEFLEX) capsule 500 mg (500 mg Oral Given 9/18/23 1302)       Diagnostic Studies  Results Reviewed     Procedure Component Value Units Date/Time    FLU/RSV/COVID - if FLU/RSV clinically relevant [[id number]]  (Abnormal) Collected: 09/18/23 1301    Lab Status: Final result Specimen: Nares from Nose Updated: 09/18/23 1347     SARS-CoV-2 Positive     INFLUENZA A PCR Negative     INFLUENZA B PCR Negative     RSV PCR Negative    Narrative:      FOR PEDIATRIC PATIENTS - copy/paste COVID Guidelines URL to browser: [email or web address]    SARS-CoV-2 assay is a Nucleic Acid Amplification assay intended for the  qualitative detection of nucleic acid from SARS-CoV-2 in nasopharyngeal  swabs. Results are for the presumptive identification of SARS-CoV-2 RNA.     Positive results are indicative of infection with SARS-CoV-2, the virus  causing COVID-19, but do not rule out bacterial infection or co-infection  with other viruses. Laboratories within the [facility] and its  territories are required to report all positive results to the appropriate  public health authorities. Negative results do not preclude SARS-CoV-2  infection and should not be used as the sole basis for treatment or other  patient management decisions. Negative results must be combined with  clinical observations, patient history, and epidemiological information. This test has not been FDA cleared or approved. This test has been authorized by FDA under an Emergency Use Authorization  (EUA). This test is only authorized for the duration of time the  declaration that circumstances exist justifying the authorization of the  emergency use of an in vitro diagnostic tests for detection of SARS-CoV-2  virus and/or diagnosis of COVID-19 infection under section 564(b)(1) of  the Act, 21 U. S.C. 282LCQ-5(D)(8), unless the authorization is terminated  or revoked sooner. The test has been validated but independent review by FDA  and CLIA is pending. Test performed using [employer] GeneXpert: This RT-PCR assay targets N2,  a region unique to SARS-CoV-2. A conserved region in the E-gene was chosen  for pan-Sarbecovirus detection which includes SARS-CoV-2. According to CMS-2020-01-R, this platform meets the definition of high-throughput technology.     Basic metabolic panel [[id number]] Collected: 09/18/23 1301    Lab Status: Final result Specimen: Blood from Arm, Left Updated: 09/18/23 1331     Sodium 139 mmol/L      Potassium 3.6 mmol/L      Chloride 102 mmol/L      CO2 28 mmol/L      ANION GAP 9 mmol/L      BUN 11 mg/dL      Creatinine 0.86 mg/dL      Glucose 123 mg/dL      Calcium 8.9 mg/dL      eGFR 72 ml/min/1.73sq m     Narrative:      [place] guidelines for Chronic Kidney Disease (CKD):   •  Stage 1 with normal or high GFR (GFR > 90 mL/min/1.73 square meters)  •  Stage 2 Mild CKD (GFR = 60-89 mL/min/1.73 square meters)  •  Stage 3A Moderate CKD (GFR = 45-59 mL/min/1.73 square meters)  •  Stage 3B Moderate CKD (GFR = 30-44 mL/min/1.73 square meters)  •  Stage 4 Severe CKD (GFR = 15-29 mL/min/1.73 square meters)  •  Stage 5 End Stage CKD (GFR <15 mL/min/1.73 square meters)  Note: GFR calculation is accurate only with a steady state creatinine    CBC and differential [[id number]]  (Abnormal) Collected: 09/18/23 1301    Lab Status: Final result Specimen: Blood from Arm, Left Updated: 09/18/23 1319     WBC 3.27 Thousand/uL      RBC 3.27 Million/uL      Hemoglobin 12.2 g/dL      Hematocrit 35.8 %       fL      MCH 37.3 pg      MCHC 34.1 g/dL      RDW 12.5 %      MPV 9.4 fL      Platelets 417 Thousands/uL      nRBC 0 /100 WBCs      Neutrophils Relative 63 %      Immat GRANS % 0 %      Lymphocytes Relative 28 %      Monocytes Relative 7 %      Eosinophils Relative 1 %      Basophils Relative 1 %      Neutrophils Absolute 2.08 Thousands/µL      Immature Grans Absolute 0.01 Thousand/uL      Lymphocytes Absolute 0.91 Thousands/µL      Monocytes Absolute 0.22 Thousand/µL      Eosinophils Absolute 0.03 Thousand/µL      Basophils Absolute 0.02 Thousands/µL                  XR chest 1 view portable   Final Result by Gabby Neumann MD (09/18 1454)      No acute cardiopulmonary disease. Workstation performed: [id number]                    Procedures  Procedures         ED Course  ED Course as of 09/24/23 0615   Mon Sep 18, 2023   1352 Out of the window for oral antivirals. Plan to treat right lower extremity cellulitis with Keflex and discharge with outpatient follow-up. SBIRT 22yo+    Flowsheet Row Most Recent Value   Initial Alcohol Screen: US AUDIT-C     1. How often do you have a drink containing alcohol? 0 Filed at: 09/18/2023 1217   2.  How many drinks containing alcohol do you have on a typical day you are drinking? 0 Filed at: 09/18/2023 1217   3a. Male UNDER 65: How often do you have five or more drinks on one occasion? 0 Filed at: 09/18/2023 1217   3b. FEMALE Any Age, or MALE 65+: How often do you have 4 or more drinks on one occassion? 0 Filed at: 09/18/2023 1217   Audit-C Score 0 Filed at: 09/18/2023 1217   HERNESTO: How many times in the past year have you. .. Used an illegal drug or used a prescription medication for non-medical reasons? Never Filed at: 09/18/2023 1217                    Medical Decision Making  Patient is a 19-year-old female who presents for evaluation of right leg wound consistent with cellulitis. Will be started on antibiotics. Blood work reviewed and shows leukopenia. Patient is COVID-positive as well. Out of the window for oral anti-COVID medications. Chest x-ray negative for consolidation. No evidence of hypoxia. EKG reviewed and nonischemic. Advised on supportive measures and strict return precautions. Amount and/or Complexity of Data Reviewed  Labs: ordered. Radiology: ordered. Risk  OTC drugs. Prescription drug management. Disposition  Final diagnoses:   COVID-19   Cellulitis of right leg   Tinea corporis     Time reflects when diagnosis was documented in both MDM as applicable and the Disposition within this note     Time User Action Codes Description Comment    9/18/2023  2:06 PM Azma Woodson Add [U07.1] COVID-19     9/18/2023  2:07 PM Missy Dickerson Add [K76.502] Cellulitis of right leg     9/18/2023  2:23 PM Azam Woodson Add [B35.4] Tinea corporis       ED Disposition     ED Disposition   Discharge    Condition   Stable    Date/Time   Mon Sep 18, 2023 [facility] discharge to home/self care.                Follow-up Information     Follow up With Specialties Details Why [facility] Emergency Department Emergency Medicine  If symptoms worsen [address]/[employer] [place] [address]  [address] [facility] [address] [place] [facility], [address] [facility] [address]    [address] [facility] Schedule an appointment as soon as possible for a visit   [place] [id number] [phone] [address] [facility], [address] [facility], [address] [facility]   [phone]          Discharge Medication List as of 9/18/2023  2:15 PM      START taking these medications    Details   cephalexin (KEFLEX) 500 mg capsule Take 1 capsule (500 mg total) by mouth every 6 (six) hours for 7 days, Starting Mon 9/18/2023, Until Mon 9/25/2023, Normal         CONTINUE these medications which have NOT CHANGED    Details   HYDROcodone-acetaminophen (Norco) 5-325 mg per tablet Take 1 tablet by mouth every 6 (six) hours as needed for pain Max Daily Amount: 4 tablets, Starting Wed 12/22/2021, Normal      ibuprofen (MOTRIN) 800 mg tablet Take 1 tablet (800 mg total) by mouth every 8 (eight) hours as needed for mild pain, Starting Wed 12/22/2021, Normal      levothyroxine 88 mcg tablet levothyroxine sodium 88 mcg tabs, Historical Med      methylPREDNISolone 4 MG tablet therapy pack Use as directed on package, Normal      torsemide (DEMADEX) 10 mg tablet Take 10 mg by mouth daily, Starting Thu 1/6/2022, Until Fri 1/6/2023, Historical Med             No discharge procedures on file.     PDMP Review       Value Time User    PDMP Reviewed  Yes 12/22/2021  2:31 PM Alyx Hunt MD          ED Provider  Electronically Signed by           Fred Campbell MD  09/24/23 1973

## 2023-11-23 ENCOUNTER — HOSPITAL ENCOUNTER (EMERGENCY)
Facility: HOSPITAL | Age: 63
Discharge: HOME | End: 2023-11-23
Attending: EMERGENCY MEDICINE
Payer: COMMERCIAL

## 2023-11-23 VITALS
TEMPERATURE: 98 F | DIASTOLIC BLOOD PRESSURE: 66 MMHG | HEIGHT: 63 IN | HEART RATE: 94 BPM | RESPIRATION RATE: 20 BRPM | SYSTOLIC BLOOD PRESSURE: 131 MMHG | WEIGHT: 214 LBS | OXYGEN SATURATION: 95 % | BODY MASS INDEX: 37.92 KG/M2

## 2023-11-23 DIAGNOSIS — L03.90 CELLULITIS: ICD-10-CM

## 2023-11-23 DIAGNOSIS — Z51.89 VISIT FOR WOUND CHECK: Primary | ICD-10-CM

## 2023-11-23 PROCEDURE — 99282 EMERGENCY DEPT VISIT SF MDM: CPT

## 2023-11-23 PROCEDURE — 87070 CULTURE OTHR SPECIMN AEROBIC: CPT | Performed by: EMERGENCY MEDICINE

## 2023-11-23 PROCEDURE — 99284 EMERGENCY DEPT VISIT MOD MDM: CPT | Performed by: EMERGENCY MEDICINE

## 2023-11-23 PROCEDURE — 87186 SC STD MICRODIL/AGAR DIL: CPT | Performed by: EMERGENCY MEDICINE

## 2023-11-23 PROCEDURE — 87205 SMEAR GRAM STAIN: CPT | Performed by: EMERGENCY MEDICINE

## 2023-11-23 PROCEDURE — 87147 CULTURE TYPE IMMUNOLOGIC: CPT | Performed by: EMERGENCY MEDICINE

## 2023-11-23 RX ORDER — AMOXICILLIN AND CLAVULANATE POTASSIUM 875; 125 MG/1; MG/1
1 TABLET, FILM COATED ORAL ONCE
Status: COMPLETED | OUTPATIENT
Start: 2023-11-23 | End: 2023-11-23

## 2023-11-23 RX ORDER — AMOXICILLIN AND CLAVULANATE POTASSIUM 875; 125 MG/1; MG/1
1 TABLET, FILM COATED ORAL EVERY 12 HOURS
Qty: 14 TABLET | Refills: 0 | Status: SHIPPED | OUTPATIENT
Start: 2023-11-23 | End: 2023-11-30

## 2023-11-23 RX ORDER — GINSENG 100 MG
1 CAPSULE ORAL ONCE
Status: COMPLETED | OUTPATIENT
Start: 2023-11-23 | End: 2023-11-23

## 2023-11-23 RX ADMIN — BACITRACIN ZINC 1 SMALL APPLICATION: 500 OINTMENT TOPICAL at 13:29

## 2023-11-23 RX ADMIN — AMOXICILLIN AND CLAVULANATE POTASSIUM 1 TABLET: 875; 125 TABLET, FILM COATED ORAL at 13:28

## 2023-11-23 NOTE — ED PROVIDER NOTES
History  Chief Complaint   Patient presents with    Wound Check     Wound of right leg     60-year-old female presents emergency room complaining of a chronic wound that she has had on her right lower extremity for over 2 months. The patient notes that she had put Vicks on her leg and went to sleep and woke up and had a burn that slowly worsened over time. The patient has been seen and placed on antibiotics as well as cream for her wound in the past and was referred to wound care. The patient notes that she never actually saw wound care and did not do anything else about it. The patient's daughter saw her leg today and said that she needed to get antibiotics and was sent to the ER. The patient notes that there is mild pain at this time and says that sometimes it gets more severe. The patient denies fever chills or lymphangitic streaks. Prior to Admission Medications   Prescriptions Last Dose Informant Patient Reported? Taking?    HYDROcodone-acetaminophen (Norco) 5-325 mg per tablet   No No   Sig: Take 1 tablet by mouth every 6 (six) hours as needed for pain Max Daily Amount: 4 tablets   benzonatate (TESSALON PERLES) 100 mg capsule   No No   Sig: Take 1 capsule (100 mg total) by mouth every 8 (eight) hours   clotrimazole (LOTRIMIN) 1 % cream   No No   Sig: Apply to affected area 2 times daily for 4 weeks   ibuprofen (MOTRIN) 800 mg tablet   No No   Sig: Take 1 tablet (800 mg total) by mouth every 8 (eight) hours as needed for mild pain   Patient not taking: Reported on 1/12/2022    levothyroxine 88 mcg tablet   Yes No   Sig: levothyroxine sodium 88 mcg tabs   methylPREDNISolone 4 MG tablet therapy pack   No No   Sig: Use as directed on package   Patient not taking: Reported on 1/12/2022    torsemide (DEMADEX) 10 mg tablet   Yes No   Sig: Take 10 mg by mouth daily      Facility-Administered Medications: None       Past Medical History:   Diagnosis Date    Breast cancer ([address])     Hypothyroidism Hypothyroidism     Kidney stones        Past Surgical History:   Procedure Laterality Date    TUBAL LIGATION         History reviewed. No pertinent family history. I have reviewed and agree with the history as documented. E-Cigarette/Vaping    E-Cigarette Use Never User      E-Cigarette/Vaping Substances     Social History     Tobacco Use    Smoking status: Never    Smokeless tobacco: Never   Vaping Use    Vaping Use: Never used   Substance Use Topics    Alcohol use: Not Currently    Drug use: Never       Review of Systems   Constitutional:  Positive for activity change. Negative for chills and fever. HENT:  Negative for ear pain and sore throat. Eyes:  Negative for pain and visual disturbance. Respiratory:  Negative for cough and shortness of breath. Cardiovascular:  Negative for chest pain and palpitations. Gastrointestinal:  Negative for abdominal pain and vomiting. Genitourinary:  Negative for dysuria and hematuria. Musculoskeletal:  Negative for arthralgias and back pain. Skin:  Positive for wound. Negative for color change and rash. All other systems reviewed and are negative. Physical Exam  Physical Exam  Vitals and nursing note reviewed. Constitutional:       General: She is not in acute distress. Appearance: Normal appearance. She is well-developed. HENT:      Head: Normocephalic and atraumatic. Right Ear: External ear normal.      Left Ear: External ear normal.      Nose: Nose normal.      Mouth/Throat:      Mouth: Mucous membranes are moist.   Eyes:      Conjunctiva/sclera: Conjunctivae normal.   Cardiovascular:      Rate and Rhythm: Normal rate and regular rhythm. Pulses: Normal pulses. Heart sounds: Normal heart sounds. No murmur heard. Pulmonary:      Effort: Pulmonary effort is normal. No respiratory distress. Breath sounds: Normal breath sounds. Abdominal:      General: Bowel sounds are normal.      Palpations: Abdomen is soft. Tenderness: There is no abdominal tenderness. Musculoskeletal:         General: Swelling present. No deformity. Cervical back: Neck supple. Skin:     General: Skin is warm and dry. Capillary Refill: Capillary refill takes less than 2 seconds. Findings: Lesion and rash present. Comments: Patient with a 2 cm in diameter ulcerative type lesion to the medical aspect of the right lower extremity. There is surrounding dottie erythema. There is no lymphangitic streaking or discharge. The lesion looks like a burn that did not heal or potentially a venous stasis ulcer. Neurological:      General: No focal deficit present. Mental Status: She is alert and oriented to person, place, and time. Mental status is at baseline. Vital Signs  ED Triage Vitals   Temperature Pulse Respirations Blood Pressure SpO2   11/23/23 1305 11/23/23 1305 11/23/23 1305 11/23/23 1306 11/23/23 1305   98 °F (36.7 °C) 94 20 131/66 95 %      Temp Source Heart Rate Source Patient Position - Orthostatic VS BP Location FiO2 (%)   11/23/23 1305 11/23/23 1305 -- -- --   Temporal Monitor         Pain Score       11/23/23 1305       7           Vitals:    11/23/23 1305 11/23/23 1306   BP:  131/66   Pulse: 94          Visual Acuity      ED Medications  Medications   amoxicillin-clavulanate (AUGMENTIN) 875-125 mg per tablet 1 tablet (1 tablet Oral Given 11/23/23 1328)   bacitracin topical ointment 1 small application (1 small application Topical Given 11/23/23 1329)       Diagnostic Studies  Results Reviewed       Procedure Component Value Units Date/Time    Wound culture and Gram stain [[id number]] Collected: 11/23/23 1327    Lab Status:  In process Specimen: Wound from Ankle Updated: 11/23/23 1402                   No orders to display              Procedures  Procedures         ED Course                                             Medical Decision Making  70-year-old female presents emergency department complaining of a wound that has been on her right lower extremity for roughly 2 months. Patient notes that her daughter's and was concerned that she needed antibiotics and sent her to the ER. Patient denies any fever or chills but notes to have a 2 cm circumferential ulcerative like wound to the medial aspect of the right lower extremity. There is surrounding dottie erythema but no lymphangitic streaking or palpable crepitance of the wound. Patient's vital signs are stable. Patient's right lower extremity is neurovascularly intact. Sensation is intact. The patient notes that she has not followed up with wound care as she was requested. The patient be placed on Augmentin for a week and urged to use antibiotic ointment over the wound and dressed daily. The patient was given a repeat order for wound care and urged the importance of this. Patient was discharged to home and was told to return if worse. Amount and/or Complexity of Data Reviewed  Labs: ordered. Risk  OTC drugs. Prescription drug management. Disposition  Final diagnoses:   Visit for wound check   Cellulitis     Time reflects when diagnosis was documented in both MDM as applicable and the Disposition within this note       Time User Action Codes Description Comment    11/23/2023  1:13 PM [facility] [Z51.89] Visit for wound check     11/23/2023  1:14 PM Stanley, [address] [place] [address] [J95.94] Cellulitis           ED Disposition       ED Disposition   Discharge    Condition   Stable    Date/Time   u Nov 23, 2023  1:26 PM    Comment   Tess Renee discharge to home/self care.                    Follow-up Information       Follow up With Specialties Details Why [id number] [facility], DO Family Medicine On 11/29/2023  [facility] [id number] [phone]              Discharge Medication List as of 11/23/2023  1:29 PM        START taking these medications    Details   amoxicillin-clavulanate (AUGMENTIN) 875-125 mg per tablet Take 1 tablet by mouth every 12 (twelve) hours for 7 days, Starting Thu 11/23/2023, Until Thu 11/30/2023, Normal           CONTINUE these medications which have NOT CHANGED    Details   benzonatate (TESSALON PERLES) 100 mg capsule Take 1 capsule (100 mg total) by mouth every 8 (eight) hours, Starting Mon 9/18/2023, Normal      clotrimazole (LOTRIMIN) 1 % cream Apply to affected area 2 times daily for 4 weeks, Normal      HYDROcodone-acetaminophen (Norco) 5-325 mg per tablet Take 1 tablet by mouth every 6 (six) hours as needed for pain Max Daily Amount: 4 tablets, Starting Wed 12/22/2021, Normal      ibuprofen (MOTRIN) 800 mg tablet Take 1 tablet (800 mg total) by mouth every 8 (eight) hours as needed for mild pain, Starting Wed 12/22/2021, Normal      levothyroxine 88 mcg tablet levothyroxine sodium 88 mcg tabs, Historical Med      methylPREDNISolone 4 MG tablet therapy pack Use as directed on package, Normal      torsemide (DEMADEX) 10 mg tablet Take 10 mg by mouth daily, Starting Thu 1/6/2022, Until Fri 1/6/2023, Historical Med                 PDMP Review         Value Time User    PDMP Reviewed  Yes 12/22/2021  2:31 PM Barb Bhatia MD            ED Provider  Electronically Signed by             Анна Hansen., DO  11/24/23 0034

## 2023-11-23 NOTE — DISCHARGE INSTRUCTIONS
It is imperative that you follow-up with wound care for further evaluation and treatment. This wound will likely take chronic visits to heal.  For now, use Augmentin 1 pill twice a day for a week. Place bacitracin ointment and dressing to the wound each day. Recheck with your family doctor next week, and return to the ER for any new, concerning, or worsening issues.

## 2023-11-25 LAB
BACTERIA WND AEROBE CULT: ABNORMAL
GRAM STN SPEC: ABNORMAL
GRAM STN SPEC: ABNORMAL

## 2023-11-27 RX ORDER — CEPHALEXIN 500 MG/1
500 CAPSULE ORAL EVERY 6 HOURS SCHEDULED
Qty: 28 CAPSULE | Refills: 0 | Status: SHIPPED | OUTPATIENT
Start: 2023-11-27 | End: 2023-12-04

## 2023-12-05 ENCOUNTER — OFFICE VISIT (OUTPATIENT)
Dept: WOUND CARE | Facility: CLINIC | Age: 63
End: 2023-12-05
Payer: COMMERCIAL

## 2023-12-05 VITALS
SYSTOLIC BLOOD PRESSURE: 128 MMHG | TEMPERATURE: 97.7 F | HEIGHT: 63 IN | DIASTOLIC BLOOD PRESSURE: 70 MMHG | HEART RATE: 76 BPM | RESPIRATION RATE: 18 BRPM | BODY MASS INDEX: 38.09 KG/M2 | WEIGHT: 215 LBS

## 2023-12-05 DIAGNOSIS — R60.9 PERIPHERAL EDEMA: ICD-10-CM

## 2023-12-05 DIAGNOSIS — I83.018 VENOUS STASIS ULCER OF OTHER PART OF RIGHT LOWER LEG WITH FAT LAYER EXPOSED, UNSPECIFIED WHETHER VARICOSE VEINS PRESENT (HCC): Primary | ICD-10-CM

## 2023-12-05 DIAGNOSIS — L97.812 VENOUS STASIS ULCER OF OTHER PART OF RIGHT LOWER LEG WITH FAT LAYER EXPOSED, UNSPECIFIED WHETHER VARICOSE VEINS PRESENT (HCC): Primary | ICD-10-CM

## 2023-12-05 DIAGNOSIS — E11.69 TYPE 2 DIABETES MELLITUS WITH OTHER SPECIFIED COMPLICATION, WITHOUT LONG-TERM CURRENT USE OF INSULIN (HCC): ICD-10-CM

## 2023-12-05 PROCEDURE — 11045 DBRDMT SUBQ TISS EACH ADDL: CPT | Performed by: STUDENT IN AN ORGANIZED HEALTH CARE EDUCATION/TRAINING PROGRAM

## 2023-12-05 PROCEDURE — 11042 DBRDMT SUBQ TIS 1ST 20SQCM/<: CPT | Performed by: STUDENT IN AN ORGANIZED HEALTH CARE EDUCATION/TRAINING PROGRAM

## 2023-12-05 PROCEDURE — 99204 OFFICE O/P NEW MOD 45 MIN: CPT | Performed by: STUDENT IN AN ORGANIZED HEALTH CARE EDUCATION/TRAINING PROGRAM

## 2023-12-05 PROCEDURE — 99213 OFFICE O/P EST LOW 20 MIN: CPT | Performed by: STUDENT IN AN ORGANIZED HEALTH CARE EDUCATION/TRAINING PROGRAM

## 2023-12-05 RX ORDER — LIDOCAINE 40 MG/G
CREAM TOPICAL ONCE
Status: COMPLETED | OUTPATIENT
Start: 2023-12-05 | End: 2023-12-05

## 2023-12-05 RX ADMIN — LIDOCAINE 1 APPLICATION: 40 CREAM TOPICAL at 11:00

## 2023-12-05 NOTE — PATIENT INSTRUCTIONS
Orders Placed This Encounter   Procedures    Wound cleansing and dressings     Right LE wounds:    Wash your hands with soap and water. Remove old dressing, discard into plastic bag and place in trash. Cleanse the wound with a mild soap and water such as Dove, rinse well and pat dry with clean gauze prior to applying a clean dressing. Shower yes on the days you are changing the dressings    Apply Polymem to the wounds. Cover with gauze/ ABD Pad and Secure with yared and tape  Change dressing every other day and as needed        Elastic Tubular Stocking--Spandagrip Size F to the RLE    Tubular elastic bandage: Apply from base of toes to behind the knee. Apply in AM, may remove for sleep. Avoid prolonged standing in one place. Elevate leg(s) above the level of the heart when sitting or as much as possible. Treatments completed as above at the [facility] today      Increase the amount of Protein in your diet to assist with wound healing      Monitor for any signs or symptoms of infection such as increased redness, pain, foul smelling, fever, pus like drainage. If they should occur then you should go to the ER for an evaluation.      Standing Status:   Future     Standing Expiration Date:   12/5/2024

## 2023-12-07 NOTE — PROGRESS NOTES
Patient ID: Rolando Duarte is a 61 y.o. female Date of Birth 1960       Chief Complaint   Patient presents with    New Patient Visit     Open areas to the RLE. Has been applying antibiotic ointment to the open areas each day and covering with a band aid. Allergies:  Sulfamethoxazole-trimethoprim    Diagnosis:   Diagnosis ICD-10-CM Associated Orders   1. Venous stasis ulcer of other part of right lower leg with fat layer exposed, unspecified whether varicose veins present ([facility])  I83.018 lidocaine (LMX) 4 % cream    L97.812 Wound cleansing and dressings     Debridement      2. Peripheral edema  R60.9       3. Type 2 diabetes mellitus with other specified complication, without long-term current use of insulin ([facility])  E11.69            Assessment  & Plan:    Initial evaluation of ulceration on R medial and anterior lower extremity. Appears to be venous in nature. There is slough overlying a fibrinous ulcer base with serous drainage. Lower extremity with hyperpigmentation of gaiter region bilaterally consistent with venous insufficiency. No diffuse erythema or lymphangitic streaking to suggest persistent cellulitis. Surgical debridement, as below. Polymem to ulcerations. Change every other day or prn for excess drainage. Spandagrip for compression. May require increased compression however pt is reluctant to trial increased compression at this time because she would like to avoid discomfort. Ulcers can be cleansed with gentle soap and water on days of dressing changes. Avoid soaking ulcers in any standing bodies of water. Avoid any harsh cleansers or hydrogen peroxide. Obtain 3-4 servings of protein daily. A1C results reviewed with the patient today. Well controlled at 5.5 as of three weeks prior. Elevate legs when resting and avoid prolonged standing in one place.  Recommend sleeping in a bed rather than recliner however pt is unfortuanately unable to bring herself to do so since her husbands passing. Take diuretic as prescribed. Instructed to monitor for any changes including redness or swelling surrounding the wound, increased drainage or pain as well as fevers or chills. F/u in one week. Instructed to call if any questions or concerns arise in meantime. Subjective:   12/07/23: 60 y/o F with PMHx of DM, breast CA, hypothyroidism, peripheral edema presents for evaluation of wound on her R leg which has been present for about two months. Pt notes she initially tried a cream to the area which did not help with healing; she recently presented to the ED for further evaluation on 11/23/23 at which time a culture was taken and she was started on Augmentin which was later changed to Keflex which patient recently completed her course of on 12/04/23. Currently antibioitic ointment and bandage are being applied to the open wound. Pt sleeps in a recliner; unfortunately she is no longer able to sleep in her bed after her  passed away. Notes she elevated her legs with cushions on the recliner and her legs are above the level of her heart. Obtains protein in her diet. Does not smoke. Has a history of working on her feet for long hours at a time. Denies fevers, chills. The following portions of the patient's history were reviewed and updated as appropriate: There is no problem list on file for this patient. Past Medical History:   Diagnosis Date    Breast cancer ([address])     Hypothyroidism     Hypothyroidism     Kidney stones      Past Surgical History:   Procedure Laterality Date    TUBAL LIGATION       No family history on file. Social History     Socioeconomic History    Marital status:       Spouse name: None    Number of children: None    Years of education: None    Highest education level: None   Occupational History    None   Tobacco Use    Smoking status: Never    Smokeless tobacco: Never   Vaping Use    Vaping Use: Never used   Substance and Sexual Activity Alcohol use: Not Currently    Drug use: Never    Sexual activity: None   Other Topics Concern    None   Social History Narrative    None     Social Determinants of Health     Financial Resource Strain: Not on file   Food Insecurity: Not on file   Transportation Needs: Not on file   Physical Activity: Not on file   Stress: Not on file   Social Connections: Not on file   Intimate Partner Violence: Not on file   Housing Stability: Not on file       Current Outpatient Medications:     benzonatate (TESSALON PERLES) 100 mg capsule, Take 1 capsule (100 mg total) by mouth every 8 (eight) hours, Disp: 21 capsule, Rfl: 0    clotrimazole (LOTRIMIN) 1 % cream, Apply to affected area 2 times daily for 4 weeks, Disp: 15 g, Rfl: 0    HYDROcodone-acetaminophen (Norco) 5-325 mg per tablet, Take 1 tablet by mouth every 6 (six) hours as needed for pain Max Daily Amount: 4 tablets, Disp: 15 tablet, Rfl: 0    ibuprofen (MOTRIN) 800 mg tablet, Take 1 tablet (800 mg total) by mouth every 8 (eight) hours as needed for mild pain (Patient not taking: Reported on 1/12/2022 ), Disp: 45 tablet, Rfl: 0    levothyroxine 88 mcg tablet, levothyroxine sodium 88 mcg tabs, Disp: , Rfl:     methylPREDNISolone 4 MG tablet therapy pack, Use as directed on package (Patient not taking: Reported on 1/12/2022 ), Disp: 21 tablet, Rfl: 0    torsemide (DEMADEX) 10 mg tablet, Take 10 mg by mouth daily, Disp: , Rfl:     Review of Systems   Constitutional:  Negative for chills and fever. Respiratory:  Negative for shortness of breath. Cardiovascular:  Positive for leg swelling. Skin:  Positive for wound (RLE). Objective:  /70   Pulse 76   Temp 97.7 °F (36.5 °C)   Resp 18   Ht 5' 3" (1.6 m)   Wt 97.5 kg (215 lb)   BMI 38.09 kg/m²   Pain Score:   6     Physical Exam  Vitals reviewed. Cardiovascular:      Rate and Rhythm: Normal rate.       Pulses:           Dorsalis pedis pulses are 2+ on the right side and detected w/ Doppler on the left side.        Posterior tibial pulses are detected w/ Doppler on the right side and detected w/ Doppler on the left side. Pulmonary:      Effort: Pulmonary effort is normal. No respiratory distress. Musculoskeletal:      Right lower leg: Edema present. Left lower leg: Edema present. Skin:     Findings: Wound present. Comments: R medial and anterior lower extremity with slough overlying a fibrinous ulcer base with serous drainage. Lower extremity with hyperpigmentation of gaiter region bilaterally consistent with venous insufficiency. No diffuse erythema or lymphangitic streaking to suggest persistent cellulitis. Neurological:      Mental Status: She is alert. Wound 12/05/23 Venous Ulcer Leg Right; Lower; Anterior (Active)   Wound Image   12/05/23 1059   Wound Description Slough; Yellow;Pink;Epithelialization 12/05/23 1013   Joy-wound Assessment Edema; Erythema 12/05/23 1013   Wound Length (cm) 6.5 cm 12/05/23 1013   Wound Width (cm) 0.5 cm 12/05/23 1013   Wound Depth (cm) 0.1 cm 12/05/23 1013   Wound Surface Area (cm^2) 3.25 cm^2 12/05/23 1013   Wound Volume (cm^3) 0.325 cm^3 12/05/23 1013   Calculated Wound Volume (cm^3) 0.33 cm^3 12/05/23 1013   Drainage Amount Scant 12/05/23 1013   Drainage Description Serous; Yellow 12/05/23 1013   Non-staged Wound Description Full thickness 12/05/23 1013       Wound 12/05/23 Venous Ulcer Leg Right;Medial;Lower (Active)   Wound Image   12/05/23 1059   Wound Description Yellow;Pink;Slough 12/05/23 1014   Joy-wound Assessment Edema; Erythema 12/05/23 1014   Wound Length (cm) 2.7 cm 12/05/23 1014   Wound Width (cm) 1.9 cm 12/05/23 1014   Wound Depth (cm) 0.1 cm 12/05/23 1014   Wound Surface Area (cm^2) 5.13 cm^2 12/05/23 1014   Wound Volume (cm^3) 0.513 cm^3 12/05/23 1014   Calculated Wound Volume (cm^3) 0.51 cm^3 12/05/23 1014   Drainage Amount Small 12/05/23 1014   Drainage Description Serous; Yellow 12/05/23 1014   Non-staged Wound Description Full thickness 12/05/23 1014   Dressing Status Intact 12/05/23 1014                     Debridement   Wound 12/05/23 Venous Ulcer Leg Right;Medial;Lower    Universal Protocol:  Consent: Verbal consent obtained. Consent given by: patient  Time out: Immediately prior to procedure a "time out" was called to verify the correct patient, procedure, equipment, support staff and site/side marked as required. Patient understanding: patient states understanding of the procedure being performed  Patient identity confirmed: verbally with patient    Performed by: PA  Debridement type: surgical  Level of debridement: subcutaneous tissue  Pain control: lidocaine 4%  Post-debridement measurements  Length (cm): 2.7  Width (cm): 1.9  Depth (cm): 0.2  Percent debrided: 100%  Surface Area (cm^2): 5.13  Area debrided (cm^2): 5.13  Volume (cm^3): 1.03  Tissue and other material debrided: dermis and subcutaneous tissue  Devitalized tissue debrided: slough  Instrument(s) utilized: curette  Bleeding: small  Hemostasis obtained with: pressure  Procedural pain (0-10): 0  Post-procedural pain: 0   Response to treatment: procedure was tolerated well         Results from last 6 Months   Lab Units 11/23/23  1327   WOUND CULTURE  2+ Growth of Staphylococcus aureus*           Wound Instructions:  Orders Placed This Encounter   Procedures    Wound cleansing and dressings     Right LE wounds:    Wash your hands with soap and water. Remove old dressing, discard into plastic bag and place in trash. Cleanse the wound with a mild soap and water such as Dove, rinse well and pat dry with clean gauze prior to applying a clean dressing. Shower yes on the days you are changing the dressings    Apply Polymem to the wounds.   Cover with gauze/ ABD Pad and Secure with yared and tape  Change dressing every other day and as needed        Elastic Tubular Stocking--Spandagrip Size F to the RLE    Tubular elastic bandage: Apply from base of toes to behind the knee. Apply in AM, may remove for sleep. Avoid prolonged standing in one place. Elevate leg(s) above the level of the heart when sitting or as much as possible. Treatments completed as above at the [facility] today      Increase the amount of Protein in your diet to assist with wound healing      Monitor for any signs or symptoms of infection such as increased redness, pain, foul smelling, fever, pus like drainage. If they should occur then you should go to the ER for an evaluation. Standing Status:   Future     Standing Expiration Date:   12/5/2024    Debridement     This order was created via procedure documentation       Cally Mikle Knuckles, PA-C        Portions of the record may have been created with voice recognition software. Occasional wrong word or "sound alike" substitutions may have occurred due to the inherent limitations of voice recognition software. Read the chart carefully and recognize, using context, where substitutions have occurred.

## 2023-12-12 ENCOUNTER — OFFICE VISIT (OUTPATIENT)
Dept: WOUND CARE | Facility: CLINIC | Age: 63
End: 2023-12-12
Payer: COMMERCIAL

## 2023-12-12 VITALS
DIASTOLIC BLOOD PRESSURE: 81 MMHG | HEART RATE: 89 BPM | SYSTOLIC BLOOD PRESSURE: 123 MMHG | RESPIRATION RATE: 20 BRPM | TEMPERATURE: 97.3 F

## 2023-12-12 DIAGNOSIS — E11.69 TYPE 2 DIABETES MELLITUS WITH OTHER SPECIFIED COMPLICATION, WITHOUT LONG-TERM CURRENT USE OF INSULIN (HCC): ICD-10-CM

## 2023-12-12 DIAGNOSIS — I83.018 VENOUS STASIS ULCER OF OTHER PART OF RIGHT LOWER LEG WITH FAT LAYER EXPOSED, UNSPECIFIED WHETHER VARICOSE VEINS PRESENT (HCC): Primary | ICD-10-CM

## 2023-12-12 DIAGNOSIS — R60.9 PERIPHERAL EDEMA: ICD-10-CM

## 2023-12-12 DIAGNOSIS — L97.812 VENOUS STASIS ULCER OF OTHER PART OF RIGHT LOWER LEG WITH FAT LAYER EXPOSED, UNSPECIFIED WHETHER VARICOSE VEINS PRESENT (HCC): Primary | ICD-10-CM

## 2023-12-12 PROCEDURE — 97597 DBRDMT OPN WND 1ST 20 CM/<: CPT | Performed by: STUDENT IN AN ORGANIZED HEALTH CARE EDUCATION/TRAINING PROGRAM

## 2023-12-12 PROCEDURE — 99213 OFFICE O/P EST LOW 20 MIN: CPT | Performed by: STUDENT IN AN ORGANIZED HEALTH CARE EDUCATION/TRAINING PROGRAM

## 2023-12-12 RX ORDER — LIDOCAINE 40 MG/G
CREAM TOPICAL ONCE
Status: COMPLETED | OUTPATIENT
Start: 2023-12-12 | End: 2023-12-12

## 2023-12-12 RX ADMIN — LIDOCAINE: 40 CREAM TOPICAL at 11:40

## 2023-12-12 NOTE — PROGRESS NOTES
Patient ID: Obdulia Goetz is a 61 y.o. female Date of Birth 1960       Chief Complaint   Patient presents with    Follow Up Wound Care Visit       Allergies:  Sulfamethoxazole-trimethoprim    Diagnosis:   Diagnosis ICD-10-CM Associated Orders   1. Venous stasis ulcer of other part of right lower leg with fat layer exposed, unspecified whether varicose veins present ([facility])  I83.018 lidocaine (LMX) 4 % cream    L97.812 Wound cleansing and dressings     Debridement      2. Type 2 diabetes mellitus with other specified complication, without long-term current use of insulin ([facility])  E11.69       3. Peripheral edema  R60.9            Assessment  & Plan:    F/u ulcerations of R anterior and medial lower extremity. Anterior ulcerations appear dry with scant serous drainage and no surrounding erythema. Medial ulceration with exudate overlying fibrinous ulcer base with few scattered granular buds. Drainage is small-moderate without surrounding erythema. Selective debridement, as below. Bacitracin to anterior ulcerations and Polymem to medial ulceration. ABIs taken in office. RLE with 2+ DP and PT pulses. DAHIANA of 0.98. Will increase compression to Coflex lite. Discussed if toes become painful, numb or change color or multilayer wrap feels too tight office should be notified immediately and wrap removed. Do not get wrap wet. Recommend purchasing cast cover if going into the shower. Sugars well controlled with most recent A1c of 5.5. Elevate legs when resting and avoid prolonged standing in one place. Recommend sleeping in a bed rather than recliner however pt is unfortuanately unable to bring herself to do so since her husbands passing. Take diuretic as prescribed. Monitor for signs of infection including increased pain at site of ulcerations under multilayer compression wrap as well as fevers, chills. F/u in one week. Instructed to call if any questions or concerns arise in meantime.             Subjective: 12/07/23: 60 y/o F with PMHx of DM, breast CA, hypothyroidism, peripheral edema presents for evaluation of wound on her R leg which has been present for about two months. Pt notes she initially tried a cream to the area which did not help with healing; she recently presented to the ED for further evaluation on 11/23/23 at which time a culture was taken and she was started on Augmentin which was later changed to Keflex which patient recently completed her course of on 12/04/23. Currently antibioitic ointment and bandage are being applied to the open wound. Pt sleeps in a recliner; unfortunately she is no longer able to sleep in her bed after her  passed away. Notes she elevated her legs with cushions on the recliner and her legs are above the level of her heart. Obtains protein in her diet. Does not smoke. Has a history of working on her feet for long hours at a time. Denies fevers, chills. 12/12/23: Pt presents for f/u of R lower leg venous ulceration. Polymem was recommended as a dressing at previous visit. It appears pt has been placing topical antibiotic ointment on polymem prior to applying the dressings this week. No significant changes in PMHx since previous visit. Denies fevers, chills. The following portions of the patient's history were reviewed and updated as appropriate: There is no problem list on file for this patient. Past Medical History:   Diagnosis Date    Breast cancer ([address])     Hypothyroidism     Hypothyroidism     Kidney stones      Past Surgical History:   Procedure Laterality Date    TUBAL LIGATION       No family history on file. Social History     Socioeconomic History    Marital status:       Spouse name: None    Number of children: None    Years of education: None    Highest education level: None   Occupational History    None   Tobacco Use    Smoking status: Never    Smokeless tobacco: Never   Vaping Use    Vaping status: Never Used   Substance and Sexual Activity    Alcohol use: Not Currently    Drug use: Never    Sexual activity: None   Other Topics Concern    None   Social History Narrative    None     Social Determinants of Health     Financial Resource Strain: Not on file   Food Insecurity: Not on file   Transportation Needs: Not on file   Physical Activity: Not on file   Stress: Not on file   Social Connections: Not on file   Intimate Partner Violence: Not on file   Housing Stability: Not on file       Current Outpatient Medications:     benzonatate (TESSALON PERLES) 100 mg capsule, Take 1 capsule (100 mg total) by mouth every 8 (eight) hours, Disp: 21 capsule, Rfl: 0    clotrimazole (LOTRIMIN) 1 % cream, Apply to affected area 2 times daily for 4 weeks, Disp: 15 g, Rfl: 0    HYDROcodone-acetaminophen (Norco) 5-325 mg per tablet, Take 1 tablet by mouth every 6 (six) hours as needed for pain Max Daily Amount: 4 tablets, Disp: 15 tablet, Rfl: 0    ibuprofen (MOTRIN) 800 mg tablet, Take 1 tablet (800 mg total) by mouth every 8 (eight) hours as needed for mild pain (Patient not taking: Reported on 1/12/2022 ), Disp: 45 tablet, Rfl: 0    levothyroxine 88 mcg tablet, levothyroxine sodium 88 mcg tabs, Disp: , Rfl:     methylPREDNISolone 4 MG tablet therapy pack, Use as directed on package (Patient not taking: Reported on 1/12/2022 ), Disp: 21 tablet, Rfl: 0    torsemide (DEMADEX) 10 mg tablet, Take 10 mg by mouth daily, Disp: , Rfl:     Review of Systems   Constitutional:  Negative for chills and fever. Respiratory:  Negative for shortness of breath. Cardiovascular:  Positive for leg swelling. Skin:  Positive for wound (RLE). Objective:  /81   Pulse 89   Temp (!) 97.3 °F (36.3 °C)   Resp 20   Pain Score: 0-No pain     Physical Exam  Vitals reviewed. Cardiovascular:      Rate and Rhythm: Normal rate. Pulses:           Dorsalis pedis pulses are 2+ on the right side. Posterior tibial pulses are 2+ on the right side.    Pulmonary: Effort: Pulmonary effort is normal. No respiratory distress. Musculoskeletal:      Right lower leg: Edema present. Left lower leg: Edema present. Skin:     Findings: Wound present. Comments: Ulcerations of R anterior and medial lower extremity are present. Anterior ulcerations appear dry with scant serous drainage and no surrounding erythema. Medial ulceration with exudate overlying fibrinous ulcer base with few scattered granular buds. Drainage is small-moderate without surrounding erythema. See full wound assessment. Neurological:      Mental Status: She is alert. Wound 12/05/23 Venous Ulcer Leg Right; Lower; Anterior (Active)   Wound Image   12/12/23 1043   Wound Description Yellow;Pink;Granulation tissue 12/12/23 1052   Joy-wound Assessment Erythema; Intact 12/12/23 1052   Wound Length (cm) 1.3 cm 12/12/23 1052   Wound Width (cm) 0.3 cm 12/12/23 1052   Wound Depth (cm) 0.1 cm 12/12/23 1052   Wound Surface Area (cm^2) 0.39 cm^2 12/12/23 1052   Wound Volume (cm^3) 0.039 cm^3 12/12/23 1052   Calculated Wound Volume (cm^3) 0.04 cm^3 12/12/23 1052   Change in Wound Size % 87.88 12/12/23 1052   Drainage Amount Scant 12/12/23 1052   Drainage Description Serous 12/12/23 1052   Non-staged Wound Description Full thickness 12/12/23 1052   Dressing Status Intact 12/12/23 1052       Wound 12/05/23 Venous Ulcer Leg Right;Medial;Lower (Active)   Wound Image   12/12/23 1044   Wound Description Yellow;Pink;Slough;Bleeding;Epithelialization;Granulation tissue 12/12/23 1054   Joy-wound Assessment Dry;Pink;Scar Tissue 12/12/23 1054   Wound Length (cm) 2.4 cm 12/12/23 1054   Wound Width (cm) 3.7 cm 12/12/23 1054   Wound Depth (cm) 0.1 cm 12/12/23 1054   Wound Surface Area (cm^2) 8.88 cm^2 12/12/23 1054   Wound Volume (cm^3) 0.888 cm^3 12/12/23 1054   Calculated Wound Volume (cm^3) 0.89 cm^3 12/12/23 1054   Change in Wound Size % -74.51 12/12/23 1054   Drainage Amount Small 12/12/23 105 Drainage Description Serous; Yellow 12/12/23 1054   Non-staged Wound Description Full thickness 12/12/23 1054   Treatments Cleansed 12/12/23 1054   Patient Tolerance Tolerated well 12/12/23 1054   Dressing Status Intact 12/12/23 1054           Debridement   Wound 12/05/23 Venous Ulcer Leg Right;Medial;Lower    Universal Protocol:  Consent: Verbal consent obtained. Consent given by: patient  Time out: Immediately prior to procedure a "time out" was called to verify the correct patient, procedure, equipment, support staff and site/side marked as required. Patient understanding: patient states understanding of the procedure being performed  Patient identity confirmed: verbally with patient    Debridement Details  Performed by: PA  Debridement type: selective  Pain control: lidocaine 4%      Post-debridement measurements  Length (cm): 2.4  Width (cm): 3.7  Depth (cm): 0.1  Percent debrided: 100%  Surface Area (cm^2): 8.88  Area Debrided (cm^2): 8.88  Volume (cm^3): 0.89    Devitalized tissue debrided: exudate  Instrument(s) utilized: curette  Bleeding: small  Hemostasis obtained with: pressure  Response to treatment: procedure was not tolerated well  Debridement Comments: No significant change in depth with debridement d/t pt discomfort       Results from last 6 Months   Lab Units 11/23/23  1327   WOUND CULTURE  2+ Growth of Staphylococcus aureus*           Wound Instructions:  Orders Placed This Encounter   Procedures    Wound cleansing and dressings     Right LE wounds:     Shower NO or you may used a cast cover on the right lower leg and keep the dressing/wrap dry     Apply Bacitracin to anterior leg ulcer . Apply Polymem to the medial wound. Cover with ABD Pad and Secure with yared and tape  Apply Coflex Lite to the right lower leg    Multi-layer compression wrap Instructions  Keep compression wrap/wraps clean and dry. If wraps are too tight and you experience numbness/tingling, call the wound center.  If after hours, remove wraps or proceed to nearest E.R. and call wound center in AM.    Wrap will be changed 1 x weekly    Avoid prolonged standing in one place. Elevate leg(s) above the level of the heart when sitting or as much as possible. Treatments completed as above at the [facility] today     Increase the amount of Protein in your diet to assist with wound healing     Monitor for any signs or symptoms of infection such as increased redness, pain, foul smelling, fever, pus like drainage. If they should occur then you should go to the ER for an evaluation. Follow up in 1 week     Standing Status:   Future     Standing Expiration Date:   12/12/2024    Debridement     This order was created via procedure documentation       Cally Bundy Emery, PA-C        Portions of the record may have been created with voice recognition software. Occasional wrong word or "sound alike" substitutions may have occurred due to the inherent limitations of voice recognition software. Read the chart carefully and recognize, using context, where substitutions have occurred.

## 2023-12-12 NOTE — PATIENT INSTRUCTIONS
Orders Placed This Encounter   Procedures    Wound cleansing and dressings     Right LE wounds:     Shower NO or you may used a cast cover on the right lower leg and keep the dressing/wrap dry     Apply Bacitracin to anterior leg ulcer . Apply Polymem to the medial wound. Cover with ABD Pad and Secure with yared and tape  Apply Coflex Lite to the right lower leg    Multi-layer compression wrap Instructions  Keep compression wrap/wraps clean and dry. If wraps are too tight and you experience numbness/tingling, call the wound center. If after hours, remove wraps or proceed to nearest E.R. and call wound center in AM.    Ria Paddock will be changed 1 x weekly    Avoid prolonged standing in one place. Elevate leg(s) above the level of the heart when sitting or as much as possible. Treatments completed as above at the [facility] today     Increase the amount of Protein in your diet to assist with wound healing     Monitor for any signs or symptoms of infection such as increased redness, pain, foul smelling, fever, pus like drainage. If they should occur then you should go to the ER for an evaluation.     Follow up in 1 week     Standing Status:   Future     Standing Expiration Date:   12/12/2024

## 2023-12-19 ENCOUNTER — OFFICE VISIT (OUTPATIENT)
Dept: WOUND CARE | Facility: CLINIC | Age: 63
End: 2023-12-19
Payer: COMMERCIAL

## 2023-12-19 VITALS
TEMPERATURE: 97.6 F | SYSTOLIC BLOOD PRESSURE: 128 MMHG | RESPIRATION RATE: 20 BRPM | HEART RATE: 86 BPM | DIASTOLIC BLOOD PRESSURE: 81 MMHG

## 2023-12-19 DIAGNOSIS — I83.018 VENOUS STASIS ULCER OF OTHER PART OF RIGHT LOWER LEG WITH FAT LAYER EXPOSED, UNSPECIFIED WHETHER VARICOSE VEINS PRESENT (HCC): Primary | ICD-10-CM

## 2023-12-19 DIAGNOSIS — I83.018 VENOUS STASIS ULCER OF OTHER PART OF RIGHT LOWER LEG LIMITED TO BREAKDOWN OF SKIN, UNSPECIFIED WHETHER VARICOSE VEINS PRESENT (HCC): ICD-10-CM

## 2023-12-19 DIAGNOSIS — L97.812 VENOUS STASIS ULCER OF OTHER PART OF RIGHT LOWER LEG WITH FAT LAYER EXPOSED, UNSPECIFIED WHETHER VARICOSE VEINS PRESENT (HCC): Primary | ICD-10-CM

## 2023-12-19 DIAGNOSIS — L97.811 VENOUS STASIS ULCER OF OTHER PART OF RIGHT LOWER LEG LIMITED TO BREAKDOWN OF SKIN, UNSPECIFIED WHETHER VARICOSE VEINS PRESENT (HCC): ICD-10-CM

## 2023-12-19 PROCEDURE — 97597 DBRDMT OPN WND 1ST 20 CM/<: CPT | Performed by: STUDENT IN AN ORGANIZED HEALTH CARE EDUCATION/TRAINING PROGRAM

## 2023-12-19 RX ORDER — LIDOCAINE 40 MG/G
CREAM TOPICAL ONCE
Status: COMPLETED | OUTPATIENT
Start: 2023-12-19 | End: 2023-12-19

## 2023-12-19 RX ADMIN — LIDOCAINE: 40 CREAM TOPICAL at 11:44

## 2023-12-19 NOTE — PROGRESS NOTES
Patient ID: Martha Payan is a 63 y.o. female Date of Birth 1960       Chief Complaint   Patient presents with    Follow Up Wound Care Visit     Right leg ulcers        Allergies:  Sulfamethoxazole-trimethoprim    Diagnosis:   Diagnosis ICD-10-CM Associated Orders   1. Venous stasis ulcer of other part of right lower leg with fat layer exposed, unspecified whether varicose veins present ([facility])  I83.018 Wound cleansing and dressings    L97.812 lidocaine (LMX) 4 % cream     Debridement Venous Ulcer Right;Medial;Lower Leg      2. Venous stasis ulcer of other part of right lower leg limited to breakdown of skin, unspecified whether varicose veins present (HCC)  I83.018 Debridement Venous Ulcer Right;Lower;Anterior Leg    L97.811            Assessment  & Plan:    F/u ulceration overlying R anterior tibia. One area on anterior shin with dried crusted exudate. Post debridement there is a small partial thickness ulceration that remains present with scant serous drainage.   Bacitracin and adpatic. Coflex lite for compression.   F/u R medial VSU with fibrinous ulcer base and scattered granular buds. Drainage is small-moderate without surrounding erythema.   Selective debridement, as below. Continue with Polymem and Coflex lite for compression. R DAHIANA of 0.98.   Sugars well controlled with most recent A1c of 5.5.   Elevate legs when resting and avoid prolonged standing in one place. Recommend sleeping in a bed rather than recliner however pt is unfortuanately unable to bring herself to do so since her husbands passing.   Take diuretic as prescribed.   Monitor for signs of infection including increased pain at site of ulcerations under multilayer compression wrap as well as fevers, chills.   F/u in one week. Instructed to call if any questions or concerns arise in meantime.            Subjective:   12/07/23: 62 y/o F with PMHx of DM, breast CA, hypothyroidism, peripheral edema presents for evaluation of wound on her R leg  which has been present for about two months. Pt notes she initially tried a cream to the area which did not help with healing; she recently presented to the ED for further evaluation on 11/23/23 at which time a culture was taken and she was started on Augmentin which was later changed to Keflex which patient recently completed her course of on 12/04/23. Currently antibioitic ointment and bandage are being applied to the open wound. Pt sleeps in a recliner; unfortunately she is no longer able to sleep in her bed after her  passed away. Notes she elevated her legs with cushions on the recliner and her legs are above the level of her heart. Obtains protein in her diet. Does not smoke. Has a history of working on her feet for long hours at a time. Denies fevers, chills.     12/12/23: Pt presents for f/u of R lower leg venous ulceration. Polymem was recommended as a dressing at previous visit. It appears pt has been placing topical antibiotic ointment on polymem prior to applying the dressings this week. No significant changes in PMHx since previous visit. Denies fevers, chills.     12/19/23: Pt presents for f/u of RLE VSU. Currently Bacitracin is being utilized to anterior ulcers and Polymem to the medial ulceration along with Coflex lite for compression. Pt reports no problems with the mutlilayer compression wrap aside from some itching sensation. No color change, pain in toes or sliding of the wraps. Has been elevating her legs and taking diuretic as prescribed.           The following portions of the patient's history were reviewed and updated as appropriate:   There is no problem list on file for this patient.    Past Medical History:   Diagnosis Date    Breast cancer (HCC)     Hypothyroidism     Hypothyroidism     Kidney stones      Past Surgical History:   Procedure Laterality Date    TUBAL LIGATION       No family history on file.  Social History     Socioeconomic History    Marital status:       Spouse name: None    Number of children: None    Years of education: None    Highest education level: None   Occupational History    None   Tobacco Use    Smoking status: Never    Smokeless tobacco: Never   Vaping Use    Vaping status: Never Used   Substance and Sexual Activity    Alcohol use: Not Currently    Drug use: Never    Sexual activity: None   Other Topics Concern    None   Social History Narrative    None     Social Determinants of Health     Financial Resource Strain: Not on file   Food Insecurity: Not on file   Transportation Needs: Not on file   Physical Activity: Not on file   Stress: Not on file   Social Connections: Not on file   Intimate Partner Violence: Not on file   Housing Stability: Not on file       Current Outpatient Medications:     benzonatate (TESSALON PERLES) 100 mg capsule, Take 1 capsule (100 mg total) by mouth every 8 (eight) hours, Disp: 21 capsule, Rfl: 0    clotrimazole (LOTRIMIN) 1 % cream, Apply to affected area 2 times daily for 4 weeks, Disp: 15 g, Rfl: 0    HYDROcodone-acetaminophen (Norco) 5-325 mg per tablet, Take 1 tablet by mouth every 6 (six) hours as needed for pain Max Daily Amount: 4 tablets, Disp: 15 tablet, Rfl: 0    ibuprofen (MOTRIN) 800 mg tablet, Take 1 tablet (800 mg total) by mouth every 8 (eight) hours as needed for mild pain (Patient not taking: Reported on 1/12/2022 ), Disp: 45 tablet, Rfl: 0    levothyroxine 88 mcg tablet, levothyroxine sodium 88 mcg tabs, Disp: , Rfl:     methylPREDNISolone 4 MG tablet therapy pack, Use as directed on package (Patient not taking: Reported on 1/12/2022 ), Disp: 21 tablet, Rfl: 0    torsemide (DEMADEX) 10 mg tablet, Take 10 mg by mouth daily, Disp: , Rfl:   No current facility-administered medications for this visit.    Review of Systems   Constitutional:  Negative for chills and fever.   Respiratory:  Negative for shortness of breath.    Cardiovascular:  Positive for leg swelling.   Skin:  Positive for wound (RLE).         +pruritus           Objective:  /81   Pulse 86   Temp 97.6 °F (36.4 °C)   Resp 20   Pain Score: 0-No pain     Physical Exam  Vitals reviewed.   Cardiovascular:      Rate and Rhythm: Normal rate.      Pulses:           Dorsalis pedis pulses are 2+ on the right side.        Posterior tibial pulses are 2+ on the right side.   Pulmonary:      Effort: Pulmonary effort is normal. No respiratory distress.   Musculoskeletal:      Right lower leg: Edema present.      Left lower leg: Edema present.   Skin:     Findings: Wound present.             Comments:  One area on anterior shin with dried crusted exudate. Post debridement there is a small partial thickness ulceration that remains present with scant serous drainage.     R medial VSU with fibrinous ulcer base and scattered granular buds. Drainage is small-moderate without surrounding erythema.            Neurological:      Mental Status: She is alert.           Wound 12/05/23 Venous Ulcer Leg Right;Lower;Anterior (Active)   Wound Image   12/19/23 1106   Wound Description Pink;Other (Comment) 12/19/23 1112   Joy-wound Assessment Intact;Pink 12/19/23 1112   Wound Length (cm) 1 cm 12/19/23 1112   Wound Width (cm) 0.2 cm 12/19/23 1112   Wound Depth (cm) 0.1 cm 12/19/23 1112   Wound Surface Area (cm^2) 0.2 cm^2 12/19/23 1112   Wound Volume (cm^3) 0.02 cm^3 12/19/23 1112   Calculated Wound Volume (cm^3) 0.02 cm^3 12/19/23 1112   Change in Wound Size % 93.94 12/19/23 1112   Drainage Amount Scant 12/19/23 1112   Drainage Description Brown 12/19/23 1112   Non-staged Wound Description Full thickness 12/19/23 1112   Treatments Cleansed 12/19/23 1112   Patient Tolerance Tolerated well 12/19/23 1112   Dressing Status Intact 12/19/23 1112       Wound 12/05/23 Venous Ulcer Leg Right;Medial;Lower (Active)   Wound Image   12/19/23 1106   Wound Description Yellow;Pink;Epithelialization;Granulation tissue 12/19/23 1113   Joy-wound Assessment Dry;Pink;Scar Tissue 12/19/23 1113  "  Wound Length (cm) 2.7 cm 12/19/23 1113   Wound Width (cm) 1.5 cm 12/19/23 1113   Wound Depth (cm) 0.1 cm 12/19/23 1113   Wound Surface Area (cm^2) 4.05 cm^2 12/19/23 1113   Wound Volume (cm^3) 0.405 cm^3 12/19/23 1113   Calculated Wound Volume (cm^3) 0.41 cm^3 12/19/23 1113   Change in Wound Size % 19.61 12/19/23 1113   Drainage Amount Small 12/19/23 1113   Drainage Description Serosanguineous 12/19/23 1113   Non-staged Wound Description Full thickness 12/19/23 1113   Treatments Cleansed 12/19/23 1113   Patient Tolerance Tolerated well 12/19/23 1113   Dressing Status Intact 12/19/23 1113                Debridement   Wound 12/05/23 Venous Ulcer Leg Right;Lower;Anterior    Universal Protocol:  Consent: Verbal consent obtained.  Consent given by: patient  Time out: Immediately prior to procedure a \"time out\" was called to verify the correct patient, procedure, equipment, support staff and site/side marked as required.  Patient understanding: patient states understanding of the procedure being performed  Patient identity confirmed: verbally with patient    Debridement Details  Performed by: PA  Debridement type: selective  Pain control: lidocaine 4%      Post-debridement measurements  Length (cm): 1  Width (cm): 0.2  Depth (cm): 0.1  Percent debrided: 100%  Surface Area (cm^2): 0.2  Area Debrided (cm^2): 0.2  Volume (cm^3): 0.02    Devitalized tissue debrided: exudate  Instrument(s) utilized: curette  Bleeding: small  Hemostasis obtained with: pressure  Procedural pain (0-10): 0  Post-procedural pain: 0   Response to treatment: procedure was tolerated well    Debridement   Wound 12/05/23 Venous Ulcer Leg Right;Medial;Lower    Universal Protocol:  Consent: Verbal consent obtained.  Consent given by: patient  Time out: Immediately prior to procedure a \"time out\" was called to verify the correct patient, procedure, equipment, support staff and site/side marked as required.  Patient understanding: patient states " understanding of the procedure being performed  Patient identity confirmed: verbally with patient    Debridement Details  Performed by: PA  Debridement type: selective  Pain control: lidocaine 4%      Post-debridement measurements  Length (cm): 2.7  Width (cm): 1.5  Depth (cm): 0.1  Percent debrided: 100%  Surface Area (cm^2): 4.05  Area Debrided (cm^2): 4.05  Volume (cm^3): 0.41    Devitalized tissue debrided: fibrin  Instrument(s) utilized: curette  Bleeding: small  Hemostasis obtained with: pressure  Procedural pain (0-10): 0  Post-procedural pain: 0   Response to treatment: procedure was tolerated well         Results from last 6 Months   Lab Units 11/23/23  1327   WOUND CULTURE  2+ Growth of Staphylococcus aureus*           Wound Instructions:  Orders Placed This Encounter   Procedures    Wound cleansing and dressings     Right LE wounds:     Shower NO or you may used a cast cover on the right lower leg and keep the dressing/wrap dry     Apply Betamethasone to right lower leg raj wound, today only    Apply Bacitracin to anterior leg ulcer . Cover with adaptic.   Apply Polymem to the medial wound.  Cover with ABD Pad.  secure with yared and tape.   Apply Coflex Lite to the right lower leg     Treatments completed as [facility]     Multi-layer compression wrap Instructions   Keep compression wrap/wraps clean and dry. If wraps are too tight and you experience numbness/tingling, call the wound center. If after hours, remove wraps or proceed to nearest E.R. and call wound center in AM.     Wrap will be changed 1 x weekly     Avoid prolonged standing in one place.   Elevate leg(s) above the level of the heart when sitting or as much as possible.     Limit salt intake as much as possible     Increase the amount of Protein in your diet to assist with wound healing     Monitor for any signs or symptoms of infection such as increased redness, pain, foul smelling, fever, pus like drainage. If they should  "occur then you should go to the ER for an evaluation.     Follow up in 1 week     Standing Status:   Future     Standing Expiration Date:   12/19/2024    Debridement Venous Ulcer Right;Lower;Anterior Leg     This order was created via procedure documentation    Debridement Venous Ulcer Right;Medial;Lower Leg     This order was created via procedure documentation       Cally Hernandez, PA-C      Portions of the record may have been created with voice recognition software. Occasional wrong word or \"sound alike\" substitutions may have occurred due to the inherent limitations of voice recognition software. Read the chart carefully and recognize, using context, where substitutions have occurred.    "

## 2023-12-19 NOTE — PATIENT INSTRUCTIONS
Orders Placed This Encounter   Procedures    Wound cleansing and dressings     Right LE wounds:     Shower NO or you may used a cast cover on the right lower leg and keep the dressing/wrap dry     Apply Betamethasone to right lower leg raj wound, today only    Apply Bacitracin to anterior leg ulcer . Cover with adaptic.   Apply Polymem to the medial wound.  Cover with ABD Pad.  secure with yared and tape.   Apply Coflex Lite to the right lower leg     Treatments completed as above at the [facility] today     Multi-layer compression wrap Instructions   Keep compression wrap/wraps clean and dry. If wraps are too tight and you experience numbness/tingling, call the wound center. If after hours, remove wraps or proceed to nearest E.R. and call wound center in AM.     Wrap will be changed 1 x weekly     Avoid prolonged standing in one place.   Elevate leg(s) above the level of the heart when sitting or as much as possible.     Limit salt intake as much as possible     Increase the amount of Protein in your diet to assist with wound healing     Monitor for any signs or symptoms of infection such as increased redness, pain, foul smelling, fever, pus like drainage. If they should occur then you should go to the ER for an evaluation.     Follow up in 1 week     Standing Status:   Future     Standing Expiration Date:   12/19/2024

## 2023-12-26 ENCOUNTER — OFFICE VISIT (OUTPATIENT)
Dept: WOUND CARE | Facility: CLINIC | Age: 63
End: 2023-12-26
Payer: COMMERCIAL

## 2023-12-26 VITALS
SYSTOLIC BLOOD PRESSURE: 130 MMHG | HEART RATE: 82 BPM | RESPIRATION RATE: 18 BRPM | TEMPERATURE: 98.3 F | DIASTOLIC BLOOD PRESSURE: 76 MMHG

## 2023-12-26 DIAGNOSIS — L97.812 VENOUS STASIS ULCER OF OTHER PART OF RIGHT LOWER LEG WITH FAT LAYER EXPOSED, UNSPECIFIED WHETHER VARICOSE VEINS PRESENT (HCC): Primary | ICD-10-CM

## 2023-12-26 DIAGNOSIS — I83.018 VENOUS STASIS ULCER OF OTHER PART OF RIGHT LOWER LEG WITH FAT LAYER EXPOSED, UNSPECIFIED WHETHER VARICOSE VEINS PRESENT (HCC): Primary | ICD-10-CM

## 2023-12-26 PROCEDURE — 99213 OFFICE O/P EST LOW 20 MIN: CPT | Performed by: STUDENT IN AN ORGANIZED HEALTH CARE EDUCATION/TRAINING PROGRAM

## 2023-12-26 PROCEDURE — 29581 APPL MULTLAYER CMPRN SYS LEG: CPT

## 2023-12-26 RX ORDER — LIDOCAINE 40 MG/G
CREAM TOPICAL ONCE
Status: COMPLETED | OUTPATIENT
Start: 2023-12-26 | End: 2023-12-26

## 2023-12-26 RX ADMIN — LIDOCAINE 1 APPLICATION: 40 CREAM TOPICAL at 11:01

## 2023-12-26 NOTE — PROGRESS NOTES
Cast Application    Date/Time: 12/26/2023 10:30 AM    Performed by: Dara Pozo RN  Authorized by: Carmen Hernandez PA-C  Universal Protocol:  Consent: Verbal consent obtained.  Risks and benefits: risks, benefits and alternatives were discussed  Consent given by: patient  Patient understanding: patient states understanding of the procedure being performed  Patient identity confirmed: verbally with patient    Pre-procedure details:     Sensation:  Normal  Procedure details:     Laterality:  Right    Location:  Leg    Leg:  R lower legCast type:  Multi-layer compression short leg        Supplies:  2 layer wrap  Post-procedure details:     Pain:  Unchanged    Sensation:  Normal    Patient tolerance of procedure:  Tolerated well, no immediate complications  Comments:      Multiplayer wrap procedure     Before application, DAHIANA and/or TBI determined to be adequate for healing and application of compression. Lower extremity washed prior to application of compression wrap. With the foot in dorsiflexed position, Coflex Lite was applied as per physician orders without complications or complaint of pain.  The procedure was tolerated well. Toes warm & pink post application.  Patient provided education & reinforced to observe toes for any discoloration, swelling or tingling and instructed when to report to the Wound Center or to remove compression

## 2023-12-26 NOTE — PATIENT INSTRUCTIONS
Orders Placed This Encounter   Procedures    Wound cleansing and dressings     Right LE wounds:      Shower NO or you may used a cast cover on the right lower leg and keep the dressing/wrap dry     Right Anterior leg ulcer healed today.     Right Medial Leg ulcer:  Apply Polymem to the medial wound.  Cover with ABD Pad.  secure with yared and tape.   Apply Coflex Lite to the right lower leg         Multi-layer compression wrap Instructions   Keep compression wrap/wraps clean and dry. If wraps are too tight and you experience numbness/tingling, call the wound center. If after hours, remove wraps or proceed to nearest E.R. and call wound center in AM.      Wrap will be changed 1 x weekly      Avoid prolonged standing in one place.   Elevate leg(s) above the level of the heart when sitting or as much as possible.     Treatments completed as above         Limit salt intake as much as possible        Increase the amount of Protein in your diet to assist with wound healing        Monitor for any signs or symptoms of infection such as increased redness, pain, foul smelling, fever, pus like drainage. If they should occur then you should go to the ER for an evaluation.     Standing Status:   Future     Standing Expiration Date:   12/26/2024

## 2023-12-26 NOTE — PROGRESS NOTES
Patient ID: Martha Payan is a 63 y.o. female Date of Birth 1960       Chief Complaint   Patient presents with    Follow Up Wound Care Visit     Right LE wound       Allergies:  Sulfamethoxazole-trimethoprim    Diagnosis:   Diagnosis ICD-10-CM Associated Orders   1. Venous stasis ulcer of other part of right lower leg with fat layer exposed, unspecified whether varicose veins present ([facility])  I83.018 lidocaine (LMX) 4 % cream    L97.812 Wound cleansing and dressings     Cast Application           Assessment  & Plan:    F/u ulceration overlying R anterior tibia. Is now healed. There is no drainage from site of prior ulcerations nor signs of acute infection.   F/u R medial VSU with fibgrogranular ulcer base. Is measuring smaller in size with evidence of edge epithelization. Periwound with hyperpigmentation but no erythema or lymphangitic streaking.   Continue with Polymem and Coflex lite for compression. R DAHIANA of 0.98.   Sugars well controlled with most recent A1c of 5.5.   Elevate legs when resting and avoid prolonged standing in one place. Recommend sleeping in a bed rather than recliner however pt is unfortuanately unable to bring herself to do so since her husbands passing.   Take diuretic as prescribed.   Monitor for signs of infection including increased pain at site of ulcerations under multilayer compression wrap as well as fevers, chills.   F/u in one week. Instructed to call if any questions or concerns arise in meantime.          Subjective:   12/07/23: 64 y/o F with PMHx of DM, breast CA, hypothyroidism, peripheral edema presents for evaluation of wound on her R leg which has been present for about two months. Pt notes she initially tried a cream to the area which did not help with healing; she recently presented to the ED for further evaluation on 11/23/23 at which time a culture was taken and she was started on Augmentin which was later changed to Keflex which patient recently completed her course of  on 12/04/23. Currently antibioitic ointment and bandage are being applied to the open wound. Pt sleeps in a recliner; unfortunately she is no longer able to sleep in her bed after her  passed away. Notes she elevated her legs with cushions on the recliner and her legs are above the level of her heart. Obtains protein in her diet. Does not smoke. Has a history of working on her feet for long hours at a time. Denies fevers, chills.     12/12/23: Pt presents for f/u of R lower leg venous ulceration. Polymem was recommended as a dressing at previous visit. It appears pt has been placing topical antibiotic ointment on polymem prior to applying the dressings this week. No significant changes in PMHx since previous visit. Denies fevers, chills.     12/19/23: Pt presents for f/u of RLE VSU. Currently Bacitracin is being utilized to anterior ulcers and Polymem to the medial ulceration along with Coflex lite for compression. Pt reports no problems with the mutlilayer compression wrap aside from some itching sensation. No color change, pain in toes or sliding of the wraps. Has been elevating her legs and taking diuretic as prescribed.     12/26/23: Pt presents for f/u of RLE VSU. Currently Bacitracin is being utilized to anterior ulcers and Polymem to the medial ulceration along with Coflex lite for compression. Overall no reports of complaints or significant changes since prior visit. Continues to tolerate wrap well.           The following portions of the patient's history were reviewed and updated as appropriate:   There is no problem list on file for this patient.    Past Medical History:   Diagnosis Date    Breast cancer (HCC)     Hypothyroidism     Hypothyroidism     Kidney stones      Past Surgical History:   Procedure Laterality Date    TUBAL LIGATION       No family history on file.  Social History     Socioeconomic History    Marital status:      Spouse name: None    Number of children: None    Years of  education: None    Highest education level: None   Occupational History    None   Tobacco Use    Smoking status: Never    Smokeless tobacco: Never   Vaping Use    Vaping status: Never Used   Substance and Sexual Activity    Alcohol use: Not Currently    Drug use: Never    Sexual activity: None   Other Topics Concern    None   Social History Narrative    None     Social Determinants of Health     Financial Resource Strain: Not on file   Food Insecurity: Not on file   Transportation Needs: Not on file   Physical Activity: Not on file   Stress: Not on file   Social Connections: Not on file   Intimate Partner Violence: Not on file   Housing Stability: Not on file       Current Outpatient Medications:     benzonatate (TESSALON PERLES) 100 mg capsule, Take 1 capsule (100 mg total) by mouth every 8 (eight) hours, Disp: 21 capsule, Rfl: 0    clotrimazole (LOTRIMIN) 1 % cream, Apply to affected area 2 times daily for 4 weeks, Disp: 15 g, Rfl: 0    HYDROcodone-acetaminophen (Norco) 5-325 mg per tablet, Take 1 tablet by mouth every 6 (six) hours as needed for pain Max Daily Amount: 4 tablets, Disp: 15 tablet, Rfl: 0    ibuprofen (MOTRIN) 800 mg tablet, Take 1 tablet (800 mg total) by mouth every 8 (eight) hours as needed for mild pain (Patient not taking: Reported on 1/12/2022 ), Disp: 45 tablet, Rfl: 0    levothyroxine 88 mcg tablet, levothyroxine sodium 88 mcg tabs, Disp: , Rfl:     methylPREDNISolone 4 MG tablet therapy pack, Use as directed on package (Patient not taking: Reported on 1/12/2022 ), Disp: 21 tablet, Rfl: 0    torsemide (DEMADEX) 10 mg tablet, Take 10 mg by mouth daily, Disp: , Rfl:   No current facility-administered medications for this visit.    Review of Systems   Constitutional:  Negative for chills and fever.   Respiratory:  Negative for shortness of breath.    Cardiovascular:  Positive for leg swelling.   Skin:  Positive for wound (RLE).        +pruritus           Objective:  /76   Pulse 82    Temp 98.3 °F (36.8 °C)   Resp 18   Pain Score:   1     Physical Exam  Vitals reviewed.   Cardiovascular:      Rate and Rhythm: Normal rate.      Pulses:           Dorsalis pedis pulses are 2+ on the right side.        Posterior tibial pulses are 2+ on the right side.   Pulmonary:      Effort: Pulmonary effort is normal. No respiratory distress.   Musculoskeletal:      Right lower leg: Edema present.      Left lower leg: Edema present.   Skin:     Findings: Wound present.             Comments: Ulceration overlying R anterior tibia is now healed. There is no drainage from site of prior ulcerations nor signs of acute infection.     R medial VSU with fibgrogranular ulcer base is measuring smaller in size with evidence of edge epithelization. Periwound with hyperpigmentation but no erythema or lymphangitic streaking.      Neurological:      Mental Status: She is alert.         Wound 12/05/23 Venous Ulcer Leg Right;Lower;Anterior (Active)   Wound Image   12/26/23 1057   Wound Description Epithelialization 12/26/23 1103   Joy-wound Assessment Dry;Intact 12/26/23 1103   Wound Length (cm) 0 cm 12/26/23 1103   Wound Width (cm) 0 cm 12/26/23 1103   Wound Depth (cm) 0 cm 12/26/23 1103   Wound Surface Area (cm^2) 0 cm^2 12/26/23 1103   Wound Volume (cm^3) 0 cm^3 12/26/23 1103   Calculated Wound Volume (cm^3) 0 cm^3 12/26/23 1103   Change in Wound Size % 100 12/26/23 1103   Drainage Amount None 12/26/23 1103   Drainage Description Brown 12/19/23 1112   Non-staged Wound Description Not applicable 12/26/23 1103   Treatments Cleansed 12/19/23 1112   Patient Tolerance Tolerated well 12/19/23 1112   Dressing Status Intact 12/26/23 1103       Wound 12/05/23 Venous Ulcer Leg Right;Medial;Lower (Active)   Wound Image   12/26/23 1057   Wound Description Granulation tissue;Yellow;Slough 12/26/23 1058   Joy-wound Assessment Pink;Edema;Scar Tissue 12/26/23 1058   Wound Length (cm) 2.2 cm 12/26/23 1058   Wound Width (cm) 1.3 cm 12/26/23  1058   Wound Depth (cm) 0.1 cm 12/26/23 1058   Wound Surface Area (cm^2) 2.86 cm^2 12/26/23 1058   Wound Volume (cm^3) 0.286 cm^3 12/26/23 1058   Calculated Wound Volume (cm^3) 0.29 cm^3 12/26/23 1058   Change in Wound Size % 43.14 12/26/23 1058   Drainage Amount Small 12/26/23 1058   Drainage Description Serosanguineous 12/26/23 1058   Non-staged Wound Description Full thickness 12/26/23 1058   Treatments Cleansed 12/19/23 1113   Patient Tolerance Tolerated well 12/19/23 1113   Dressing Status Intact 12/26/23 1058              Results from last 6 Months   Lab Units 11/23/23  1327   WOUND CULTURE  2+ Growth of Staphylococcus aureus*           Wound Instructions:  Orders Placed This Encounter   Procedures    Wound cleansing and dressings     Right LE wounds:      Shower NO or you may used a cast cover on the right lower leg and keep the dressing/wrap dry     Right Anterior leg ulcer healed today.     Right Medial Leg ulcer:  Apply Polymem to the medial wound.  Cover with ABD Pad.  secure with yared and tape.   Apply Coflex Lite to the right lower leg         Multi-layer compression wrap Instructions   Keep compression wrap/wraps clean and dry. If wraps are too tight and you experience numbness/tingling, call the wound center. If after hours, remove wraps or proceed to nearest E.R. and call wound center in AM.      Wrap will be changed 1 x weekly      Avoid prolonged standing in one place.   Elevate leg(s) above the level of the heart when sitting or as much as possible.     Treatments completed as above         Limit salt intake as much as possible        Increase the amount of Protein in your diet to assist with wound healing        Monitor for any signs or symptoms of infection such as increased redness, pain, foul smelling, fever, pus like drainage. If they should occur then you should go to the ER for an evaluation.     Standing Status:   Future     Standing Expiration Date:   12/26/2024    Cast Application      "This order was created via procedure documentation       Cally Hernandez, PA-C    Portions of the record may have been created with voice recognition software. Occasional wrong word or \"sound alike\" substitutions may have occurred due to the inherent limitations of voice recognition software. Read the chart carefully and recognize, using context, where substitutions have occurred.      "

## 2024-01-03 ENCOUNTER — OFFICE VISIT (OUTPATIENT)
Dept: WOUND CARE | Facility: CLINIC | Age: 64
End: 2024-01-03
Payer: COMMERCIAL

## 2024-01-03 VITALS
SYSTOLIC BLOOD PRESSURE: 131 MMHG | HEART RATE: 95 BPM | DIASTOLIC BLOOD PRESSURE: 81 MMHG | RESPIRATION RATE: 20 BRPM | TEMPERATURE: 97.7 F

## 2024-01-03 DIAGNOSIS — L97.812 VENOUS STASIS ULCER OF OTHER PART OF RIGHT LOWER LEG WITH FAT LAYER EXPOSED, UNSPECIFIED WHETHER VARICOSE VEINS PRESENT (HCC): Primary | ICD-10-CM

## 2024-01-03 DIAGNOSIS — I83.018 VENOUS STASIS ULCER OF OTHER PART OF RIGHT LOWER LEG WITH FAT LAYER EXPOSED, UNSPECIFIED WHETHER VARICOSE VEINS PRESENT (HCC): Primary | ICD-10-CM

## 2024-01-03 PROCEDURE — 99213 OFFICE O/P EST LOW 20 MIN: CPT | Performed by: STUDENT IN AN ORGANIZED HEALTH CARE EDUCATION/TRAINING PROGRAM

## 2024-01-03 PROCEDURE — 29581 APPL MULTLAYER CMPRN SYS LEG: CPT

## 2024-01-03 RX ORDER — LIDOCAINE 40 MG/G
CREAM TOPICAL ONCE
Status: COMPLETED | OUTPATIENT
Start: 2024-01-03 | End: 2024-01-03

## 2024-01-03 RX ADMIN — LIDOCAINE: 40 CREAM TOPICAL at 11:01

## 2024-01-03 NOTE — PATIENT INSTRUCTIONS
Orders Placed This Encounter   Procedures    Wound cleansing and dressings     Shower NO or you may used a cast cover on the right lower leg and keep the dressing/wrap dry    Remove dressing from right lower leg and wash leg and ulcer with soap and water.    Apply HydraFera Blue Ready to the right medial leg ulcer then cover with dry dressing. Secure with Coflex. Change dressing weekly at Wound Center.      Multi-layer compression wrap Instructions:    Keep compression wrap/wraps clean and dry. If wraps are too tight and you experience numbness/tingling, call the Wound Center. If after hours, remove wraps or proceed to nearest E.R. and call Wound Center in AM.     Avoid prolonged standing in one place.    Elevate leg(s) above the level of the heart when sitting or as much as possible.    Treatments completed as above    Limit salt intake as much as possible     Increase the amount of Protein in your diet to assist with wound healing    Please call the Wound Healing Center Monday through Friday between the hours of 8:00 AM and 4:30 PM at [phone] if you have any questions, if you experience any major changes in your wound(s) or for any signs or symptoms of infection such as fever; changes in the redness, swelling, drainage, or odor of your wound. After hours, weekends or holidays please contact your primary care physician or go to the hospital emergency room.       Standing Status:   Future     Standing Expiration Date:   1/3/2025

## 2024-01-03 NOTE — PROGRESS NOTES
Cast Application    Date/Time: 1/3/2024 10:45 AM    Performed by: Rach Lazo RN  Authorized by: CHRISTIANO Nunes Protocol:  Consent: Verbal consent obtained.  Consent given by: patient  Timeout called at: 1/3/2024 11:50 AM.  Patient identity confirmed: verbally with patient    Pre-procedure details:     Sensation:  Normal  Procedure details:     Laterality:  Right    Location:  Leg    Leg:  R lower leg    Strapping: no  Cast type:  Multi-layer compression short leg      Post-procedure details:     Pain:  Improved    Sensation:  Normal    Patient tolerance of procedure:  Tolerated well, no immediate complications  Comments:      Multilayer wrap procedure     Before application, DAHIANA and/or TBI determined to be adequate for healing and application of compression. Lower extremity washed prior to application of compression wrap. With the foot in dorsiflexed position, coflex was applied as per physician orders without complications or complaint of pain.  The procedure was tolerated well. Toes warm & pink post application.  Patient provided education & reinforced to observe toes for any discoloration, swelling or tingling and instructed when to report to the Wound Center or to remove compression. Wound care as per providers orders, patient tolerated well.

## 2024-01-03 NOTE — PROGRESS NOTES
Patient ID: Martha Payan is a 63 y.o. female Date of Birth 1960       Chief Complaint   Patient presents with    Follow Up Wound Care Visit       Allergies:  Sulfamethoxazole-trimethoprim    Diagnosis:   Diagnosis ICD-10-CM Associated Orders   1. Venous stasis ulcer of other part of right lower leg with fat layer exposed, unspecified whether varicose veins present ([facility])  I83.018 lidocaine (LMX) 4 % cream    L97.812 Wound cleansing and dressings     Cast Application           Assessment  & Plan:    Follow-up venous ulcer of the right lower leg.  Measuring approximately the same in size.  There is evidence of edge epithelization present.  Ulcer base with granulation tissue and fibrin.  Overlying layer of exudate was easily mechanically debrided with saline moistened gauze.  Is moderate.  Periwound with hyperpigmentation but no azucena erythema to suggest acute soft tissue infection.  Hydrofera Blue ulceration.  Will increase compression to Coflex wrap.  DAHIANA of 0.98.  Discussed should toes become painful, numb, change color or wrap feel tight or uncomfortable she is to notify office immediately for removal.  Sugars well controlled with most recent A1c of 5.5.   Elevate legs when resting and avoid prolonged standing in one place. Recommend sleeping in a bed rather than recliner however pt is unfortuanately unable to bring herself to do so since her husbands passing.   Take diuretic as prescribed.   Monitor for signs of infection including increased pain at site of ulcerations under multilayer compression wrap as well as fevers, chills.   F/u in one week. Instructed to call if any questions or concerns arise in meantime.          Subjective:   12/07/23: 62 y/o F with PMHx of DM, breast CA, hypothyroidism, peripheral edema presents for evaluation of wound on her R leg which has been present for about two months. Pt notes she initially tried a cream to the area which did not help with healing; she recently presented  to the ED for further evaluation on 11/23/23 at which time a culture was taken and she was started on Augmentin which was later changed to Keflex which patient recently completed her course of on 12/04/23. Currently antibioitic ointment and bandage are being applied to the open wound. Pt sleeps in a recliner; unfortunately she is no longer able to sleep in her bed after her  passed away. Notes she elevated her legs with cushions on the recliner and her legs are above the level of her heart. Obtains protein in her diet. Does not smoke. Has a history of working on her feet for long hours at a time. Denies fevers, chills.     12/12/23: Pt presents for f/u of R lower leg venous ulceration. Polymem was recommended as a dressing at previous visit. It appears pt has been placing topical antibiotic ointment on polymem prior to applying the dressings this week. No significant changes in PMHx since previous visit. Denies fevers, chills.     12/19/23: Pt presents for f/u of RLE VSU. Currently Bacitracin is being utilized to anterior ulcers and Polymem to the medial ulceration along with Coflex lite for compression. Pt reports no problems with the mutlilayer compression wrap aside from some itching sensation. No color change, pain in toes or sliding of the wraps. Has been elevating her legs and taking diuretic as prescribed.     12/26/23: Pt presents for f/u of RLE VSU. Currently Bacitracin is being utilized to anterior ulcers and Polymem to the medial ulceration along with Coflex lite for compression. Overall no reports of complaints or significant changes since prior visit. Continues to tolerate wrap well.     01/03/24: Pt presents for f/u of RLE VSU. Currently Polymem and Coflex lite is being utilized for compression. Pt has been tolerating the wrap well and does not have any complaints today.           The following portions of the patient's history were reviewed and updated as appropriate:   There is no problem list  on file for this patient.    Past Medical History:   Diagnosis Date    Breast cancer (HCC)     Hypothyroidism     Hypothyroidism     Kidney stones      Past Surgical History:   Procedure Laterality Date    TUBAL LIGATION       No family history on file.  Social History     Socioeconomic History    Marital status:      Spouse name: Not on file    Number of children: Not on file    Years of education: Not on file    Highest education level: Not on file   Occupational History    Not on file   Tobacco Use    Smoking status: Never    Smokeless tobacco: Never   Vaping Use    Vaping status: Never Used   Substance and Sexual Activity    Alcohol use: Not Currently    Drug use: Never    Sexual activity: Not on file   Other Topics Concern    Not on file   Social History Narrative    Not on file     Social Determinants of Health     Financial Resource Strain: Not on file   Food Insecurity: Not on file   Transportation Needs: Not on file   Physical Activity: Not on file   Stress: Not on file   Social Connections: Not on file   Intimate Partner Violence: Not on file   Housing Stability: Not on file       Current Outpatient Medications:     benzonatate (TESSALON PERLES) 100 mg capsule, Take 1 capsule (100 mg total) by mouth every 8 (eight) hours, Disp: 21 capsule, Rfl: 0    clotrimazole (LOTRIMIN) 1 % cream, Apply to affected area 2 times daily for 4 weeks, Disp: 15 g, Rfl: 0    HYDROcodone-acetaminophen (Norco) 5-325 mg per tablet, Take 1 tablet by mouth every 6 (six) hours as needed for pain Max Daily Amount: 4 tablets, Disp: 15 tablet, Rfl: 0    ibuprofen (MOTRIN) 800 mg tablet, Take 1 tablet (800 mg total) by mouth every 8 (eight) hours as needed for mild pain (Patient not taking: Reported on 1/12/2022 ), Disp: 45 tablet, Rfl: 0    levothyroxine 88 mcg tablet, levothyroxine sodium 88 mcg tabs, Disp: , Rfl:     methylPREDNISolone 4 MG tablet therapy pack, Use as directed on package (Patient not taking: Reported on  1/12/2022 ), Disp: 21 tablet, Rfl: 0    torsemide (DEMADEX) 10 mg tablet, Take 10 mg by mouth daily, Disp: , Rfl:     Review of Systems   Constitutional:  Negative for chills and fever.   Respiratory:  Negative for shortness of breath.    Cardiovascular:  Positive for leg swelling.   Skin:  Positive for wound (RLE). Negative for color change.        +pruritus     Psychiatric/Behavioral:  Negative for agitation.          Objective:  /81   Pulse 95   Temp 97.7 °F (36.5 °C)   Resp 20   Pain Score: 0-No pain     Physical Exam  Vitals reviewed.   Cardiovascular:      Rate and Rhythm: Normal rate.      Pulses:           Dorsalis pedis pulses are 2+ on the right side.        Posterior tibial pulses are 2+ on the right side.   Pulmonary:      Effort: Pulmonary effort is normal. No respiratory distress.   Musculoskeletal:      Right lower leg: Edema present.      Left lower leg: Edema present.   Skin:     Findings: Wound present.             Comments: R medial VSU is measuring approximately the same in size.  There is evidence of edge epithelization present.  Ulcer base with granulation tissue and fibrin.  Overlying layer of exudate was easily mechanically debrided with saline moistened gauze.  Is moderate.  Periwound with hyperpigmentation but no azucena erythema to suggest acute soft tissue infection.      Neurological:      Mental Status: She is alert.           Wound 12/05/23 Venous Ulcer Leg Right;Medial;Lower (Active)   Wound Image   01/03/24 1049   Wound Description Granulation tissue;Yellow;Slough 01/03/24 1049   Joy-wound Assessment Pink;Edema;Scar Tissue 01/03/24 1049   Wound Length (cm) 2 cm 01/03/24 1049   Wound Width (cm) 1.3 cm 01/03/24 1049   Wound Depth (cm) 0.1 cm 01/03/24 1049   Wound Surface Area (cm^2) 2.6 cm^2 01/03/24 1049   Wound Volume (cm^3) 0.26 cm^3 01/03/24 1049   Calculated Wound Volume (cm^3) 0.26 cm^3 01/03/24 1049   Change in Wound Size % 49.02 01/03/24 1049   Drainage Amount Small  01/03/24 1049   Drainage Description Yellow;Brown 01/03/24 1049   Non-staged Wound Description Full thickness 01/03/24 1049   Treatments Cleansed 01/03/24 1049   Patient Tolerance Tolerated well 01/03/24 1049   Dressing Status Intact 12/26/23 1058                Results from last 6 Months   Lab Units 11/23/23  1327   WOUND CULTURE  2+ Growth of Staphylococcus aureus*           Wound Instructions:  Orders Placed This Encounter   Procedures    Wound cleansing and dressings     Shower NO or you may used a cast cover on the right lower leg and keep the dressing/wrap dry    Remove dressing from right lower leg and wash leg and ulcer with soap and water.    Apply HydraFera Blue Ready to the right medial leg ulcer then cover with dry dressing. Secure with Coflex. Change dressing weekly at Wound Center.      Multi-layer compression wrap Instructions:    Keep compression wrap/wraps clean and dry. If wraps are too tight and you experience numbness/tingling, call the Wound Center. If after hours, remove wraps or proceed to nearest E.R. and call Wound Center in AM.     Avoid prolonged standing in one place.    Elevate leg(s) above the level of the heart when sitting or as much as possible.    Treatments completed as above    Limit salt intake as much as possible     Increase the amount of Protein in your diet to assist with wound healing    Please call the Wound Healing Center Monday through Friday between the hours of 8:00 AM and 4:30 PM at [phone] if you have any questions, if you experience any major changes in your wound(s) or for any signs or symptoms of infection such as fever; changes in the redness, swelling, drainage, or odor of your wound. After hours, weekends or holidays please contact your primary care physician or go to the hospital emergency room.       Standing Status:   Future     Standing Expiration Date:   1/3/2025    Cast Application     This order was created via procedure documentation       Cally  "CHRISTIANO Hernandez      Portions of the record may have been created with voice recognition software. Occasional wrong word or \"sound alike\" substitutions may have occurred due to the inherent limitations of voice recognition software. Read the chart carefully and recognize, using context, where substitutions have occurred.    "

## 2024-01-10 ENCOUNTER — OFFICE VISIT (OUTPATIENT)
Dept: WOUND CARE | Facility: CLINIC | Age: 64
End: 2024-01-10
Payer: COMMERCIAL

## 2024-01-10 VITALS
TEMPERATURE: 97.3 F | SYSTOLIC BLOOD PRESSURE: 158 MMHG | DIASTOLIC BLOOD PRESSURE: 89 MMHG | HEART RATE: 82 BPM | RESPIRATION RATE: 20 BRPM

## 2024-01-10 DIAGNOSIS — I83.018 VENOUS STASIS ULCER OF OTHER PART OF RIGHT LOWER LEG WITH FAT LAYER EXPOSED, UNSPECIFIED WHETHER VARICOSE VEINS PRESENT (HCC): Primary | ICD-10-CM

## 2024-01-10 DIAGNOSIS — S81.801A TRAUMATIC OPEN WOUND OF RIGHT LOWER LEG, INITIAL ENCOUNTER: ICD-10-CM

## 2024-01-10 DIAGNOSIS — L97.812 VENOUS STASIS ULCER OF OTHER PART OF RIGHT LOWER LEG WITH FAT LAYER EXPOSED, UNSPECIFIED WHETHER VARICOSE VEINS PRESENT (HCC): Primary | ICD-10-CM

## 2024-01-10 PROCEDURE — 97597 DBRDMT OPN WND 1ST 20 CM/<: CPT | Performed by: STUDENT IN AN ORGANIZED HEALTH CARE EDUCATION/TRAINING PROGRAM

## 2024-01-10 PROCEDURE — 99214 OFFICE O/P EST MOD 30 MIN: CPT | Performed by: STUDENT IN AN ORGANIZED HEALTH CARE EDUCATION/TRAINING PROGRAM

## 2024-01-10 RX ORDER — LIDOCAINE 40 MG/G
CREAM TOPICAL ONCE
Status: COMPLETED | OUTPATIENT
Start: 2024-01-10 | End: 2024-01-10

## 2024-01-10 RX ADMIN — LIDOCAINE: 40 CREAM TOPICAL at 14:23

## 2024-01-10 NOTE — PROGRESS NOTES
Patient ID: Martha Payan is a 64 y.o. female Date of Birth 1960       Chief Complaint   Patient presents with    Follow Up Wound Care Visit       Allergies:  Sulfamethoxazole-trimethoprim    Diagnosis:   Diagnosis ICD-10-CM Associated Orders   1. Venous stasis ulcer of other part of right lower leg with fat layer exposed, unspecified whether varicose veins present ([facility])  I83.018 lidocaine (LMX) 4 % cream    L97.812 Wound cleansing and dressings     Wound cleansing and dressings      2. Traumatic open wound of right lower leg, initial encounter  S81.801A lidocaine (LMX) 4 % cream     Wound cleansing and dressings     Wound cleansing and dressings     Debridement     cephalexin (KEFLEX) 500 mg capsule           Assessment  & Plan:    F/u VSU of R medial lower leg. Is measuring approximately the same in size as prior visit. Ulcer with fibrin. There is healthy appearing granulation tissue around the periphery of the wound with evidence of edge epithelization. Ulceration appears dry. No periwound erythema to suggest acute soft tissue infection.    to polymem given dry appearance of ulcer and only moderate drainage. Continue with regular Coflex for compression.   Elevate legs when resting and avoid prolonged standing in one place. Recommend sleeping in a bed rather than recliner however pt is unfortuanately unable to bring herself to do so since her husbands passing.   Take diuretic as prescribed.   Monitor for signs of infection including increased pain at site of ulcerations under multilayer compression wrap as well as fevers, chills.    Initial evaluation of new traumatic wound of the R anterior shin in setting of venous insufficiency. There is dried exudate overlying wound bed. Partial thickness. Drainage is scant. There is mild surrounding erythema which is consistent with early cellulitis.  Selective debridement, as below.   Mupriocin, adaptic and overlying gauze to wound.   Keflex to  pharmacy given concerns of early soft tissue infection.   Instructed to monitor for any changes including redness or swelling surrounding the wound, increased drainage or pain as well as fevers or chills.    F/u in one week. Instructed to call if any questions or concerns arise in meantime.            Subjective:   12/07/23: 64 y/o F with PMHx of DM, breast CA, hypothyroidism, peripheral edema presents for evaluation of wound on her R leg which has been present for about two months. Pt notes she initially tried a cream to the area which did not help with healing; she recently presented to the ED for further evaluation on 11/23/23 at which time a culture was taken and she was started on Augmentin which was later changed to Keflex which patient recently completed her course of on 12/04/23. Currently antibioitic ointment and bandage are being applied to the open wound. Pt sleeps in a recliner; unfortunately she is no longer able to sleep in her bed after her  passed away. Notes she elevated her legs with cushions on the recliner and her legs are above the level of her heart. Obtains protein in her diet. Does not smoke. Has a history of working on her feet for long hours at a time. Denies fevers, chills.     12/12/23: Pt presents for f/u of R lower leg venous ulceration. Polymem was recommended as a dressing at previous visit. It appears pt has been placing topical antibiotic ointment on polymem prior to applying the dressings this week. No significant changes in PMHx since previous visit. Denies fevers, chills.     12/19/23: Pt presents for f/u of RLE VSU. Currently Bacitracin is being utilized to anterior ulcers and Polymem to the medial ulceration along with Coflex lite for compression. Pt reports no problems with the mutlilayer compression wrap aside from some itching sensation. No color change, pain in toes or sliding of the wraps. Has been elevating her legs and taking diuretic as prescribed.     12/26/23:  Pt presents for f/u of RLE VSU. Currently Bacitracin is being utilized to anterior ulcers and Polymem to the medial ulceration along with Coflex lite for compression. Overall no reports of complaints or significant changes since prior visit. Continues to tolerate wrap well.     01/03/24: Pt presents for f/u of RLE VSU. Currently Polymem and Coflex lite is being utilized for compression. Pt has been tolerating the wrap well and does not have any complaints today.       01/10/24: Pt presents for f/u of RLE VSU. Currently Hydrofera and regular Coflex are being used. She denies any significant discomfort with use of increased compression. Unfortunately she sustained a new traumatic wound to her R anterior shin that occurred yesterday after reaching for an object in her garage. Tetanus is up to date. She has been placing topical antibiotic ointment over the area.           The following portions of the patient's history were reviewed and updated as appropriate:   There is no problem list on file for this patient.    Past Medical History:   Diagnosis Date    Breast cancer (HCC)     Hypothyroidism     Hypothyroidism     Kidney stones      Past Surgical History:   Procedure Laterality Date    TUBAL LIGATION       No family history on file.  Social History     Socioeconomic History    Marital status:      Spouse name: Not on file    Number of children: Not on file    Years of education: Not on file    Highest education level: Not on file   Occupational History    Not on file   Tobacco Use    Smoking status: Never    Smokeless tobacco: Never   Vaping Use    Vaping status: Never Used   Substance and Sexual Activity    Alcohol use: Not Currently    Drug use: Never    Sexual activity: Not on file   Other Topics Concern    Not on file   Social History Narrative    Not on file     Social Determinants of Health     Financial Resource Strain: Not on file   Food Insecurity: Not on file   Transportation Needs: Not on file    Physical Activity: Not on file   Stress: Not on file   Social Connections: Not on file   Intimate Partner Violence: Not on file   Housing Stability: Not on file       Current Outpatient Medications:     cephalexin (KEFLEX) 500 mg capsule, Take 1 capsule (500 mg total) by mouth every 8 (eight) hours for 7 days, Disp: 21 capsule, Rfl: 0    benzonatate (TESSALON PERLES) 100 mg capsule, Take 1 capsule (100 mg total) by mouth every 8 (eight) hours, Disp: 21 capsule, Rfl: 0    clotrimazole (LOTRIMIN) 1 % cream, Apply to affected area 2 times daily for 4 weeks, Disp: 15 g, Rfl: 0    HYDROcodone-acetaminophen (Norco) 5-325 mg per tablet, Take 1 tablet by mouth every 6 (six) hours as needed for pain Max Daily Amount: 4 tablets, Disp: 15 tablet, Rfl: 0    ibuprofen (MOTRIN) 800 mg tablet, Take 1 tablet (800 mg total) by mouth every 8 (eight) hours as needed for mild pain (Patient not taking: Reported on 1/12/2022 ), Disp: 45 tablet, Rfl: 0    levothyroxine 88 mcg tablet, levothyroxine sodium 88 mcg tabs, Disp: , Rfl:     methylPREDNISolone 4 MG tablet therapy pack, Use as directed on package (Patient not taking: Reported on 1/12/2022 ), Disp: 21 tablet, Rfl: 0    torsemide (DEMADEX) 10 mg tablet, Take 10 mg by mouth daily, Disp: , Rfl:   No current facility-administered medications for this visit.    Review of Systems   Constitutional:  Negative for chills and fever.   Respiratory:  Negative for shortness of breath.    Cardiovascular:  Positive for leg swelling.   Skin:  Positive for wound (RLE). Negative for color change.   Psychiatric/Behavioral:  Negative for agitation.          Objective:  /89   Pulse 82   Temp (!) 97.3 °F (36.3 °C)   Resp 20   Pain Score:   8     Physical Exam  Vitals reviewed.   Cardiovascular:      Rate and Rhythm: Normal rate.      Pulses:           Dorsalis pedis pulses are 2+ on the right side.        Posterior tibial pulses are 2+ on the right side.   Pulmonary:      Effort: Pulmonary  effort is normal. No respiratory distress.   Musculoskeletal:      Right lower leg: Edema present.      Left lower leg: Edema present.   Skin:     Findings: Wound present.             Comments: R medial lower leg. Is measuring approximately the same in size as prior visit. Ulcer with fibrin. There is healthy appearing granulation tissue around the periphery of the wound with evidence of edge epithelization. Ulceration appears dry. No periwound erythema to suggest acute soft tissue infection.     New traumatic wound of the R anterior shin in setting of venous insufficiency. There is dried exudate overlying wound bed. Partial thickness. Drainage is scant.        Neurological:      Mental Status: She is alert.           Wound 12/05/23 Venous Ulcer Leg Right;Medial;Lower (Active)   Wound Image   01/10/24 1410   Wound Description Pink;White;Yellow 01/10/24 1410   Joy-wound Assessment Pink;Edema;Scar Tissue 01/10/24 1410   Wound Length (cm) 1.8 cm 01/10/24 1410   Wound Width (cm) 1.4 cm 01/10/24 1410   Wound Depth (cm) 0.1 cm 01/10/24 1410   Wound Surface Area (cm^2) 2.52 cm^2 01/10/24 1410   Wound Volume (cm^3) 0.252 cm^3 01/10/24 1410   Calculated Wound Volume (cm^3) 0.25 cm^3 01/10/24 1410   Change in Wound Size % 50.98 01/10/24 1410   Drainage Amount Small 01/10/24 1410   Drainage Description Yellow;Brown 01/10/24 1410   Non-staged Wound Description Full thickness 01/10/24 1410   Treatments Cleansed 01/10/24 1410   Patient Tolerance Tolerated well 01/10/24 1410   Dressing Status Intact 12/26/23 1058       Wound 01/10/24 Traumatic Leg Proximal;Right;Lower (Active)   Wound Image   01/10/24 1409   Wound Description Dry;Brown;Yellow 01/10/24 1409   Joy-wound Assessment Erythema 01/10/24 1409   Wound Length (cm) 1.5 cm 01/10/24 1409   Wound Width (cm) 1.5 cm 01/10/24 1409   Wound Depth (cm) 0.1 cm 01/10/24 1409   Wound Surface Area (cm^2) 2.25 cm^2 01/10/24 1409   Wound Volume (cm^3) 0.225 cm^3 01/10/24 1409   Calculated  "Wound Volume (cm^3) 0.23 cm^3 01/10/24 1409   Drainage Amount None 01/10/24 1409             Debridement   Wound 01/10/24 Traumatic Leg Proximal;Right;Lower    Universal Protocol:  Consent: Verbal consent obtained.  Consent given by: patient  Time out: Immediately prior to procedure a \"time out\" was called to verify the correct patient, procedure, equipment, support staff and site/side marked as required.  Patient understanding: patient states understanding of the procedure being performed  Patient identity confirmed: verbally with patient    Debridement Details  Performed by: PA  Debridement type: selective  Pain control: lidocaine 4%      Post-debridement measurements  Length (cm): 1.5  Width (cm): 1.5  Depth (cm): 0.1  Percent debrided: 80%  Surface Area (cm^2): 2.25  Area Debrided (cm^2): 1.8  Volume (cm^3): 0.23    Devitalized tissue debrided: exudate  Instrument(s) utilized: curette  Bleeding: small  Hemostasis obtained with: pressure  Procedural pain (0-10): 0  Post-procedural pain: 0   Response to treatment: procedure was tolerated well         Results from last 6 Months   Lab Units 11/23/23  1327   WOUND CULTURE  2+ Growth of Staphylococcus aureus*           Wound Instructions:  Orders Placed This Encounter   Procedures    Wound cleansing and dressings     Shower NO or you may used a cast cover on the right lower leg and keep the dressing/wrap dry     Remove dressing from right lower leg and wash leg and ulcer with soap and water.     Apply polymem to the right medial leg ulcer then cover with dry dressing. Secure with Coflex. Change dressing weekly at Wound Center.      Multi-layer compression wrap Instructions:     Keep compression wrap/wraps clean and dry. If wraps are too tight and you experience numbness/tingling, call the Wound Center. If after hours, remove wraps or proceed to nearest E.R. and call Wound Center in AM.      Avoid prolonged standing in one place.     Elevate leg(s) above the level of " "the heart when sitting or as much as possible.     Treatments completed as above     Limit salt intake as much as possible      Increase the amount of Protein in your diet to assist with wound healing     Please call the Wound Healing Center Monday through Friday between the hours of 8:00 AM and 4:30 PM at [phone] if you have any questions, if you experience any major changes in your wound(s) or for any signs or symptoms of infection such as fever; changes in the redness, swelling, drainage, or odor of your wound. After hours, weekends or holidays please contact your primary care physician or go to the hospital emergency room.     Standing Status:   Future     Standing Expiration Date:   1/10/2025    Wound cleansing and dressings     Apply mupricin to new wound followed by dry dressing. Change weekly at Wound Center     Standing Status:   Future     Standing Expiration Date:   1/10/2025    Debridement     This order was created via procedure documentation       Cally Hernandez, PA-C    Portions of the record may have been created with voice recognition software. Occasional wrong word or \"sound alike\" substitutions may have occurred due to the inherent limitations of voice recognition software. Read the chart carefully and recognize, using context, where substitutions have occurred.    "

## 2024-01-10 NOTE — PROGRESS NOTES
Cast Application    Date/Time: 1/10/2024 1:45 PM    Performed by: Rach Lazo RN  Authorized by: CHRISTIANO Nunes Protocol:  Consent: Verbal consent obtained.  Consent given by: patient  Timeout called at: 1/10/2024 2:28 PM.  Patient identity confirmed: verbally with patient    Pre-procedure details:     Sensation:  Normal  Procedure details:     Laterality:  Right    Location:  Leg    Leg:  R lower leg    Strapping: no  Cast type:  Multi-layer compression short leg      Post-procedure details:     Pain:  Improved    Sensation:  Normal    Patient tolerance of procedure:  Tolerated well, no immediate complications  Comments:      Multilayer wrap procedure     Before application, DAHIANA and/or TBI determined to be adequate for healing and application of compression. Lower extremity washed prior to application of compression wrap. With the foot in dorsiflexed position, coflex was applied as per physician orders without complications or complaint of pain.  The procedure was tolerated well. Toes warm & pink post application.  Patient provided education & reinforced to observe toes for any discoloration, swelling or tingling and instructed when to report to the Wound Center or to remove compression. Wound care as per providers orders, patient tolerated well.

## 2024-01-10 NOTE — PATIENT INSTRUCTIONS
Orders Placed This Encounter   Procedures    Wound cleansing and dressings     Shower NO or you may used a cast cover on the right lower leg and keep the dressing/wrap dry     Remove dressing from right lower leg and wash leg and ulcer with soap and water.     Apply polymem to the right medial leg ulcer then cover with dry dressing. Secure with Coflex. Change dressing weekly at Wound Center.      Multi-layer compression wrap Instructions:     Keep compression wrap/wraps clean and dry. If wraps are too tight and you experience numbness/tingling, call the Wound Center. If after hours, remove wraps or proceed to nearest E.R. and call Wound Center in AM.      Avoid prolonged standing in one place.     Elevate leg(s) above the level of the heart when sitting or as much as possible.     Treatments completed as above     Limit salt intake as much as possible      Increase the amount of Protein in your diet to assist with wound healing     Please call the Wound Healing Center Monday through Friday between the hours of 8:00 AM and 4:30 PM at [phone] if you have any questions, if you experience any major changes in your wound(s) or for any signs or symptoms of infection such as fever; changes in the redness, swelling, drainage, or odor of your wound. After hours, weekends or holidays please contact your primary care physician or go to the hospital emergency room.     Standing Status:   Future     Standing Expiration Date:   1/10/2025    Wound cleansing and dressings     Apply mupricin to new wound followed by dry dressing. Change weekly at Wound Center     Standing Status:   Future     Standing Expiration Date:   1/10/2025

## 2024-01-11 RX ORDER — CEPHALEXIN 500 MG/1
500 CAPSULE ORAL EVERY 8 HOURS SCHEDULED
Qty: 21 CAPSULE | Refills: 0 | Status: SHIPPED | OUTPATIENT
Start: 2024-01-11 | End: 2024-01-18

## 2024-01-17 ENCOUNTER — OFFICE VISIT (OUTPATIENT)
Dept: WOUND CARE | Facility: CLINIC | Age: 64
End: 2024-01-17
Payer: COMMERCIAL

## 2024-01-17 VITALS
SYSTOLIC BLOOD PRESSURE: 151 MMHG | RESPIRATION RATE: 18 BRPM | DIASTOLIC BLOOD PRESSURE: 93 MMHG | HEART RATE: 94 BPM | TEMPERATURE: 97.9 F

## 2024-01-17 DIAGNOSIS — I83.018 VENOUS STASIS ULCER OF OTHER PART OF RIGHT LOWER LEG WITH FAT LAYER EXPOSED, UNSPECIFIED WHETHER VARICOSE VEINS PRESENT (HCC): Primary | ICD-10-CM

## 2024-01-17 DIAGNOSIS — L97.812 VENOUS STASIS ULCER OF OTHER PART OF RIGHT LOWER LEG WITH FAT LAYER EXPOSED, UNSPECIFIED WHETHER VARICOSE VEINS PRESENT (HCC): Primary | ICD-10-CM

## 2024-01-17 PROCEDURE — 11042 DBRDMT SUBQ TIS 1ST 20SQCM/<: CPT | Performed by: STUDENT IN AN ORGANIZED HEALTH CARE EDUCATION/TRAINING PROGRAM

## 2024-01-17 NOTE — PATIENT INSTRUCTIONS
Orders Placed This Encounter   Procedures    Wound cleansing and dressings     Wound cleansing and dressings       Shower NO or you may used a cast cover on the right lower leg and keep the dressing/wrap dry      Remove dressing from right lower leg and wash leg and ulcer with soap and water.      Apply Exufiber Silver to the right medial leg ulcer then cover with dry dressing.     Secure with Coflex. Change dressing weekly at Wound Center.            Multi-layer compression wrap Instructions:      Keep compression wrap/wraps clean and dry. If wraps are too tight and you experience numbness/tingling, call the Wound Center. If after hours, remove wraps or proceed to nearest E.R. and call Wound Center in AM.      Avoid prolonged standing in one place.      Elevate leg(s) above the level of the heart when sitting or as much as possible.      Treatments completed as above      Limit salt intake as much as possible      Increase the amount of Protein in your diet to assist with wound healing      Please call the Wound Healing Center Monday through Friday between the hours of 8:00 AM and 4:30 PM at [phone] if you have any questions, if you experience any major changes in your wound(s) or for any signs or symptoms of infection such as fever; changes in the redness, swelling, drainage, or odor of your wound. After hours, weekends or holidays please contact your primary care physician or go to the hospital emergency room.     Standing Status:   Future     Standing Expiration Date:   1/17/2025

## 2024-01-17 NOTE — PROGRESS NOTES
Patient ID: Martha Payan is a 64 y.o. female Date of Birth 1960       Chief Complaint   Patient presents with    Follow Up Wound Care Visit     RLE wound care       Allergies:  Sulfamethoxazole-trimethoprim    Diagnosis:   Diagnosis ICD-10-CM Associated Orders   1. Venous stasis ulcer of other part of right lower leg with fat layer exposed, unspecified whether varicose veins present ([facility])  I83.018 Wound cleansing and dressings    L97.812 Debridement Venous Ulcer Right;Medial;Lower Leg           Assessment  & Plan:    F/u traumatic wound of the R anterior shin in setting of venous insufficiency. Is now healed. There is a single layer of epithelial tissue over the previous wound bed. No drainage from the site. Surrounding erythema has completely resolved.   No further dressing required site is now healed.   Complete course of Keflex as prescribed.   F/u VSU of the R medial lower leg. Is measuring smaller in size. There is evidence of edge epithelization. Ulceration with fibrin and granular buds. Drainage is moderate. Periwound with maceration. No erythema to indicate acute soft tissue infection.   Surgical debridement, as below.   Silver gelling fiber to ulcer.   Coflex for compression.   Take diuretic as prescribed.    Elevate legs when resting and avoid prolonged standing in one place. Avoid prolonged time with legs in dependent position.   F/u in one week. Instructed to call if any questions or concerns arise in meantime.            Subjective:   12/07/23: 62 y/o F with PMHx of DM, breast CA, hypothyroidism, peripheral edema presents for evaluation of wound on her R leg which has been present for about two months. Pt notes she initially tried a cream to the area which did not help with healing; she recently presented to the ED for further evaluation on 11/23/23 at which time a culture was taken and she was started on Augmentin which was later changed to Keflex which patient recently completed her course of  on 12/04/23. Currently antibioitic ointment and bandage are being applied to the open wound. Pt sleeps in a recliner; unfortunately she is no longer able to sleep in her bed after her  passed away. Notes she elevated her legs with cushions on the recliner and her legs are above the level of her heart. Obtains protein in her diet. Does not smoke. Has a history of working on her feet for long hours at a time. Denies fevers, chills.     12/12/23: Pt presents for f/u of R lower leg venous ulceration. Polymem was recommended as a dressing at previous visit. It appears pt has been placing topical antibiotic ointment on polymem prior to applying the dressings this week. No significant changes in PMHx since previous visit. Denies fevers, chills.     12/19/23: Pt presents for f/u of RLE VSU. Currently Bacitracin is being utilized to anterior ulcers and Polymem to the medial ulceration along with Coflex lite for compression. Pt reports no problems with the mutlilayer compression wrap aside from some itching sensation. No color change, pain in toes or sliding of the wraps. Has been elevating her legs and taking diuretic as prescribed.     12/26/23: Pt presents for f/u of RLE VSU. Currently Bacitracin is being utilized to anterior ulcers and Polymem to the medial ulceration along with Coflex lite for compression. Overall no reports of complaints or significant changes since prior visit. Continues to tolerate wrap well.     01/03/24: Pt presents for f/u of RLE VSU. Currently Polymem and Coflex lite is being utilized for compression. Pt has been tolerating the wrap well and does not have any complaints today.       01/10/24: Pt presents for f/u of RLE VSU. Currently Hydrofera and regular Coflex are being used. She denies any significant discomfort with use of increased compression. Unfortunately she sustained a new traumatic wound to her R anterior shin that occurred yesterday after reaching for an object in her garage.  Tetanus is up to date. She has been placing topical antibiotic ointment over the area.     01/17/24: Pt presents for f/u of RLE VSU and traumatic wound of R anterior shin. She has been consistently taking her antibiotic and has almost completed her course of Keflex. Denies any significant discomfort with the multilayer compression wrap.           The following portions of the patient's history were reviewed and updated as appropriate:   There is no problem list on file for this patient.    Past Medical History:   Diagnosis Date    Breast cancer (HCC)     Hypothyroidism     Hypothyroidism     Kidney stones      Past Surgical History:   Procedure Laterality Date    TUBAL LIGATION       No family history on file.  Social History     Socioeconomic History    Marital status:      Spouse name: Not on file    Number of children: Not on file    Years of education: Not on file    Highest education level: Not on file   Occupational History    Not on file   Tobacco Use    Smoking status: Never    Smokeless tobacco: Never   Vaping Use    Vaping status: Never Used   Substance and Sexual Activity    Alcohol use: Not Currently    Drug use: Never    Sexual activity: Not on file   Other Topics Concern    Not on file   Social History Narrative    Not on file     Social Determinants of Health     Financial Resource Strain: Not on file   Food Insecurity: Not on file   Transportation Needs: Not on file   Physical Activity: Not on file   Stress: Not on file   Social Connections: Not on file   Intimate Partner Violence: Not on file   Housing Stability: Not on file       Current Outpatient Medications:     benzonatate (TESSALON PERLES) 100 mg capsule, Take 1 capsule (100 mg total) by mouth every 8 (eight) hours, Disp: 21 capsule, Rfl: 0    cephalexin (KEFLEX) 500 mg capsule, Take 1 capsule (500 mg total) by mouth every 8 (eight) hours for 7 days, Disp: 21 capsule, Rfl: 0    clotrimazole (LOTRIMIN) 1 % cream, Apply to affected area 2  times daily for 4 weeks, Disp: 15 g, Rfl: 0    HYDROcodone-acetaminophen (Norco) 5-325 mg per tablet, Take 1 tablet by mouth every 6 (six) hours as needed for pain Max Daily Amount: 4 tablets, Disp: 15 tablet, Rfl: 0    ibuprofen (MOTRIN) 800 mg tablet, Take 1 tablet (800 mg total) by mouth every 8 (eight) hours as needed for mild pain (Patient not taking: Reported on 1/12/2022 ), Disp: 45 tablet, Rfl: 0    levothyroxine 88 mcg tablet, levothyroxine sodium 88 mcg tabs, Disp: , Rfl:     methylPREDNISolone 4 MG tablet therapy pack, Use as directed on package (Patient not taking: Reported on 1/12/2022 ), Disp: 21 tablet, Rfl: 0    torsemide (DEMADEX) 10 mg tablet, Take 10 mg by mouth daily, Disp: , Rfl:     Review of Systems   Constitutional:  Negative for chills and fever.   Respiratory:  Negative for shortness of breath.    Cardiovascular:  Positive for leg swelling.   Skin:  Positive for wound (RLE). Negative for color change.   Psychiatric/Behavioral:  Negative for agitation.          Objective:  /93   Pulse 94   Temp 97.9 °F (36.6 °C)   Resp 18   Pain Score: 0-No pain     Physical Exam  Vitals reviewed.   Cardiovascular:      Rate and Rhythm: Normal rate.      Pulses:           Dorsalis pedis pulses are 2+ on the right side.        Posterior tibial pulses are 2+ on the right side.   Pulmonary:      Effort: Pulmonary effort is normal. No respiratory distress.   Musculoskeletal:      Right lower leg: Edema present.      Left lower leg: Edema present.   Skin:     Findings: Wound present.             Comments: R medial lower leg is measuring smaller in size. There is evidence of edge epithelization. Ulceration with fibrin and granular buds. Drainage is moderate. Periwound with maceration. No erythema to indicate acute soft tissue infection.       Traumatic wound of the R anterior shin in setting of venous insufficiency. Is now healed. There is a single layer of epithelial tissue over the previous wound bed.  "No drainage from the site. Surrounding erythema has completely resolved.      Neurological:      Mental Status: She is alert.         Wound 12/05/23 Venous Ulcer Leg Right;Medial;Lower (Active)   Wound Image   01/17/24 1406   Wound Description Beefy red;Granulation tissue;Slough;Yellow;White 01/17/24 1345   Joy-wound Assessment Pink;Edema;Scar Tissue;Maceration 01/17/24 1345   Wound Length (cm) 1.4 cm 01/17/24 1345   Wound Width (cm) 1 cm 01/17/24 1345   Wound Depth (cm) 0.1 cm 01/17/24 1345   Wound Surface Area (cm^2) 1.4 cm^2 01/17/24 1345   Wound Volume (cm^3) 0.14 cm^3 01/17/24 1345   Calculated Wound Volume (cm^3) 0.14 cm^3 01/17/24 1345   Change in Wound Size % 72.55 01/17/24 1345   Drainage Amount Moderate 01/17/24 1345   Drainage Description Yellow;Brown 01/17/24 1345   Non-staged Wound Description Full thickness 01/17/24 1345   Treatments Cleansed 01/10/24 1410   Patient Tolerance Tolerated well 01/17/24 1345   Dressing Status Intact;Old drainage 01/17/24 1345       Wound 01/10/24 Traumatic Leg Proximal;Right;Lower (Active)   Wound Image   01/17/24 1346   Wound Description Dry;Epithelialization;Pink 01/17/24 1346   Joy-wound Assessment Swelling 01/17/24 1346   Wound Length (cm) 0 cm 01/17/24 1346   Wound Width (cm) 0 cm 01/17/24 1346   Wound Depth (cm) 0 cm 01/17/24 1346   Wound Surface Area (cm^2) 0 cm^2 01/17/24 1346   Wound Volume (cm^3) 0 cm^3 01/17/24 1346   Calculated Wound Volume (cm^3) 0 cm^3 01/17/24 1346   Change in Wound Size % 100 01/17/24 1346   Drainage Amount Small 01/17/24 1346   Drainage Description Brown;Yellow 01/17/24 1346   Non-staged Wound Description Full thickness 01/17/24 1346             Debridement   Wound 12/05/23 Venous Ulcer Leg Right;Medial;Lower    Universal Protocol:  Consent: Verbal consent obtained.  Consent given by: patient  Time out: Immediately prior to procedure a \"time out\" was called to verify the correct patient, procedure, equipment, support staff and site/side " marked as required.  Patient understanding: patient states understanding of the procedure being performed  Patient identity confirmed: verbally with patient    Debridement Details  Performed by: PA  Debridement type: surgical  Level of debridement: subcutaneous tissue  Pain control: lidocaine 4%      Post-debridement measurements  Length (cm): 1.4  Width (cm): 1  Depth (cm): 0.2  Percent debrided: 100%  Surface Area (cm^2): 1.4  Area Debrided (cm^2): 1.4  Volume (cm^3): 0.28    Tissue and other material debrided: dermis and subcutaneous tissue  Devitalized tissue debrided: fibrin  Instrument(s) utilized: curette  Bleeding: small  Hemostasis obtained with: pressure  Procedural pain (0-10): 0  Post-procedural pain: 0   Response to treatment: procedure was tolerated well             Results from last 6 Months   Lab Units 11/23/23  1327   WOUND CULTURE  2+ Growth of Staphylococcus aureus*           Wound Instructions:  Orders Placed This Encounter   Procedures    Wound cleansing and dressings     Wound cleansing and dressings       Shower NO or you may used a cast cover on the right lower leg and keep the dressing/wrap dry      Remove dressing from right lower leg and wash leg and ulcer with soap and water.      Apply Exufiber Silver to the right medial leg ulcer then cover with dry dressing.     Secure with Coflex. Change dressing weekly at Wound Center.            Multi-layer compression wrap Instructions:      Keep compression wrap/wraps clean and dry. If wraps are too tight and you experience numbness/tingling, call the Wound Center. If after hours, remove wraps or proceed to nearest E.R. and call Wound Center in AM.      Avoid prolonged standing in one place.      Elevate leg(s) above the level of the heart when sitting or as much as possible.      Treatments completed as above      Limit salt intake as much as possible      Increase the amount of Protein in your diet to assist with wound healing      Please call  "the Wound Healing Center Monday through Friday between the hours of 8:00 AM and 4:30 PM at [phone] if you have any questions, if you experience any major changes in your wound(s) or for any signs or symptoms of infection such as fever; changes in the redness, swelling, drainage, or odor of your wound. After hours, weekends or holidays please contact your primary care physician or go to the hospital emergency room.     Standing Status:   Future     Standing Expiration Date:   1/17/2025    Debridement Venous Ulcer Right;Medial;Lower Leg     This order was created via procedure documentation       Cally Hernandez, PA-C      Portions of the record may have been created with voice recognition software. Occasional wrong word or \"sound alike\" substitutions may have occurred due to the inherent limitations of voice recognition software. Read the chart carefully and recognize, using context, where substitutions have occurred.    "

## 2024-01-24 ENCOUNTER — OFFICE VISIT (OUTPATIENT)
Dept: WOUND CARE | Facility: CLINIC | Age: 64
End: 2024-01-24
Payer: COMMERCIAL

## 2024-01-24 VITALS
TEMPERATURE: 98.2 F | DIASTOLIC BLOOD PRESSURE: 90 MMHG | SYSTOLIC BLOOD PRESSURE: 154 MMHG | HEART RATE: 87 BPM | RESPIRATION RATE: 18 BRPM

## 2024-01-24 DIAGNOSIS — L97.812 VENOUS STASIS ULCER OF OTHER PART OF RIGHT LOWER LEG WITH FAT LAYER EXPOSED, UNSPECIFIED WHETHER VARICOSE VEINS PRESENT (HCC): Primary | ICD-10-CM

## 2024-01-24 DIAGNOSIS — E11.69 TYPE 2 DIABETES MELLITUS WITH OTHER SPECIFIED COMPLICATION, WITHOUT LONG-TERM CURRENT USE OF INSULIN (HCC): ICD-10-CM

## 2024-01-24 DIAGNOSIS — L97.911 NON-PRESSURE CHRONIC ULCER OF LOWER LEG, RIGHT, LIMITED TO BREAKDOWN OF SKIN (HCC): ICD-10-CM

## 2024-01-24 DIAGNOSIS — I83.018 VENOUS STASIS ULCER OF OTHER PART OF RIGHT LOWER LEG WITH FAT LAYER EXPOSED, UNSPECIFIED WHETHER VARICOSE VEINS PRESENT (HCC): Primary | ICD-10-CM

## 2024-01-24 PROCEDURE — 87186 SC STD MICRODIL/AGAR DIL: CPT | Performed by: STUDENT IN AN ORGANIZED HEALTH CARE EDUCATION/TRAINING PROGRAM

## 2024-01-24 PROCEDURE — 97597 DBRDMT OPN WND 1ST 20 CM/<: CPT | Performed by: STUDENT IN AN ORGANIZED HEALTH CARE EDUCATION/TRAINING PROGRAM

## 2024-01-24 PROCEDURE — 87147 CULTURE TYPE IMMUNOLOGIC: CPT | Performed by: STUDENT IN AN ORGANIZED HEALTH CARE EDUCATION/TRAINING PROGRAM

## 2024-01-24 PROCEDURE — 87205 SMEAR GRAM STAIN: CPT | Performed by: STUDENT IN AN ORGANIZED HEALTH CARE EDUCATION/TRAINING PROGRAM

## 2024-01-24 PROCEDURE — 87070 CULTURE OTHR SPECIMN AEROBIC: CPT | Performed by: STUDENT IN AN ORGANIZED HEALTH CARE EDUCATION/TRAINING PROGRAM

## 2024-01-24 RX ORDER — LIDOCAINE 40 MG/G
CREAM TOPICAL ONCE
Status: COMPLETED | OUTPATIENT
Start: 2024-01-24 | End: 2024-01-24

## 2024-01-24 RX ADMIN — LIDOCAINE 1 APPLICATION: 40 CREAM TOPICAL at 14:17

## 2024-01-24 NOTE — PROGRESS NOTES
Patient ID: Martha Payan is a 64 y.o. female Date of Birth 1960       Chief Complaint   Patient presents with    Follow Up Wound Care Visit     Right lower leg       Allergies:  Sulfamethoxazole-trimethoprim    Diagnosis:   Diagnosis ICD-10-CM Associated Orders   1. Venous stasis ulcer of other part of right lower leg with fat layer exposed, unspecified whether varicose veins present ([facility])  I83.018 lidocaine (LMX) 4 % cream    L97.812 Wound cleansing and dressings     Wound culture and Gram stain     Wound culture and Gram stain     Debridement      2. Non-pressure chronic ulcer of lower leg, right, limited to breakdown of skin ([facility])  L97.911       3. Type 2 diabetes mellitus with other specified complication, without long-term current use of insulin ([facility])  E11.69            Assessment  & Plan:    Original venous ulceration of the left medial lower extremity is improved.  Is measuring smaller in size.  There is healthy appearing granulation tissue present.  Ridge of epithelization from the edges is loose creating rim of undermining. No periwound erythema to suggest acute soft tissue infection.  Selective debridement, as below.  Mupirocin with overlying Adaptic.  Unna boot for compression.  Multiple new scattered areas of partial-thickness skin breakdown on the anterior shin are present. Unclear etiology but appears consistent with friction contributing to loss of epidermis.  Small amount of serous drainage.  Surrounding skin appears irritated but no diffuse erythema to suggest cellulitis. No lymphangitic streaking present.  Patient is concerned about infection.  Will obtain culture.  No further oral antibiotics are indicated based on clinical appearance at this time.  Mupirocin with overlying Adaptic to ulcerations.  Unna boot for compression.  Avoid friction, trauma, or any excess pressure to the shin.   New partial-thickness ulcerations would like to see patient back in 2 days for nurse visit and wrap  change as well as monitoring for any change in erythema to suggest start of infection.  Take diuretic as prescribed.    Elevate legs when resting and avoid prolonged standing in one place. Avoid prolonged time with legs in dependent position.   F/u in one week. Instructed to call if any questions or concerns arise in meantime.         Subjective:   12/07/23: 62 y/o F with PMHx of DM, breast CA, hypothyroidism, peripheral edema presents for evaluation of wound on her R leg which has been present for about two months. Pt notes she initially tried a cream to the area which did not help with healing; she recently presented to the ED for further evaluation on 11/23/23 at which time a culture was taken and she was started on Augmentin which was later changed to Keflex which patient recently completed her course of on 12/04/23. Currently antibioitic ointment and bandage are being applied to the open wound. Pt sleeps in a recliner; unfortunately she is no longer able to sleep in her bed after her  passed away. Notes she elevated her legs with cushions on the recliner and her legs are above the level of her heart. Obtains protein in her diet. Does not smoke. Has a history of working on her feet for long hours at a time. Denies fevers, chills.     12/12/23: Pt presents for f/u of R lower leg venous ulceration. Polymem was recommended as a dressing at previous visit. It appears pt has been placing topical antibiotic ointment on polymem prior to applying the dressings this week. No significant changes in PMHx since previous visit. Denies fevers, chills.     12/19/23: Pt presents for f/u of RLE VSU. Currently Bacitracin is being utilized to anterior ulcers and Polymem to the medial ulceration along with Coflex lite for compression. Pt reports no problems with the mutlilayer compression wrap aside from some itching sensation. No color change, pain in toes or sliding of the wraps. Has been elevating her legs and taking  diuretic as prescribed.     12/26/23: Pt presents for f/u of RLE VSU. Currently Bacitracin is being utilized to anterior ulcers and Polymem to the medial ulceration along with Coflex lite for compression. Overall no reports of complaints or significant changes since prior visit. Continues to tolerate wrap well.     01/03/24: Pt presents for f/u of RLE VSU. Currently Polymem and Coflex lite is being utilized for compression. Pt has been tolerating the wrap well and does not have any complaints today.       01/10/24: Pt presents for f/u of RLE VSU. Currently Hydrofera and regular Coflex are being used. She denies any significant discomfort with use of increased compression. Unfortunately she sustained a new traumatic wound to her R anterior shin that occurred yesterday after reaching for an object in her garage. Tetanus is up to date. She has been placing topical antibiotic ointment over the area.     01/17/24: Pt presents for f/u of RLE VSU and traumatic wound of R anterior shin. She has been consistently taking her antibiotic and has almost completed her course of Keflex. Denies any significant discomfort with the multilayer compression wrap.     01/24/24: Patient presents for follow-up of right lower extremity venous ulceration.  She has had her Coflex wrap on consistently.  Notes that yesterday around 7 PM she started noticing pain on her leg and was anxious to get to her wound care appointment to see why her leg was hurting.  Denies falling.denies wrap shifting.  Denies wrap sliding down.  Is unsure of any inciting event that could have caused the pain.  No fevers or chills.  Is concerned about continued infection of her leg and is inquiring about the use of further antibiotics.          The following portions of the patient's history were reviewed and updated as appropriate:   There is no problem list on file for this patient.    Past Medical History:   Diagnosis Date    Breast cancer (HCC)     Hypothyroidism      Hypothyroidism     Kidney stones      Past Surgical History:   Procedure Laterality Date    TUBAL LIGATION       No family history on file.  Social History     Socioeconomic History    Marital status:      Spouse name: None    Number of children: None    Years of education: None    Highest education level: None   Occupational History    None   Tobacco Use    Smoking status: Never    Smokeless tobacco: Never   Vaping Use    Vaping status: Never Used   Substance and Sexual Activity    Alcohol use: Not Currently    Drug use: Never    Sexual activity: None   Other Topics Concern    None   Social History Narrative    None     Social Determinants of Health     Financial Resource Strain: Not on file   Food Insecurity: Not on file   Transportation Needs: Not on file   Physical Activity: Not on file   Stress: Not on file   Social Connections: Not on file   Intimate Partner Violence: Not on file   Housing Stability: Not on file       Current Outpatient Medications:     benzonatate (TESSALON PERLES) 100 mg capsule, Take 1 capsule (100 mg total) by mouth every 8 (eight) hours, Disp: 21 capsule, Rfl: 0    clotrimazole (LOTRIMIN) 1 % cream, Apply to affected area 2 times daily for 4 weeks, Disp: 15 g, Rfl: 0    HYDROcodone-acetaminophen (Norco) 5-325 mg per tablet, Take 1 tablet by mouth every 6 (six) hours as needed for pain Max Daily Amount: 4 tablets, Disp: 15 tablet, Rfl: 0    ibuprofen (MOTRIN) 800 mg tablet, Take 1 tablet (800 mg total) by mouth every 8 (eight) hours as needed for mild pain (Patient not taking: Reported on 1/12/2022 ), Disp: 45 tablet, Rfl: 0    levothyroxine 88 mcg tablet, levothyroxine sodium 88 mcg tabs, Disp: , Rfl:     methylPREDNISolone 4 MG tablet therapy pack, Use as directed on package (Patient not taking: Reported on 1/12/2022 ), Disp: 21 tablet, Rfl: 0    torsemide (DEMADEX) 10 mg tablet, Take 10 mg by mouth daily, Disp: , Rfl:   No current facility-administered medications for this  visit.    Review of Systems   Constitutional:  Negative for chills and fever.   Respiratory:  Negative for shortness of breath.    Cardiovascular:  Positive for leg swelling.   Skin:  Positive for wound (RLE). Negative for color change.   Psychiatric/Behavioral:  Negative for agitation.          Objective:  /90   Pulse 87   Temp 98.2 °F (36.8 °C)   Resp 18         Physical Exam  Vitals reviewed.   Cardiovascular:      Rate and Rhythm: Normal rate.      Pulses:           Dorsalis pedis pulses are 2+ on the right side.        Posterior tibial pulses are 2+ on the right side.   Pulmonary:      Effort: Pulmonary effort is normal. No respiratory distress.   Musculoskeletal:      Right lower leg: Edema present.      Left lower leg: Edema present.   Skin:     Findings: Wound present.             Comments: Original venous ulceration of the left medial lower extremity is improved.  Is measuring smaller in size.  There is healthy appearing granulation tissue present.  Ridge of epithelization from the edges is loose creating rim of undermining. No periwound erythema to suggest acute soft tissue infection.      Multiple new scattered areas of partial-thickness skin breakdown on the anterior shin are present. Unclear etiology but appears consistent with friction contributing to loss of epidermis.  Small amount of serous drainage.  Surrounding skin appears irritated but no diffuse erythema to suggest cellulitis. No lymphangitic streaking present.       Neurological:      Mental Status: She is alert.           Wound 12/05/23 Venous Ulcer Leg Right;Medial;Lower (Active)   Wound Image   01/24/24 1405   Wound Description Yellow;Pink;Epithelialization 01/24/24 1412   Joy-wound Assessment Edema;Pink;Scaly 01/24/24 1412   Wound Length (cm) 5.1 cm 01/24/24 1412   Wound Width (cm) 0.5 cm 01/24/24 1412   Wound Depth (cm) 0.1 cm 01/24/24 1412   Wound Surface Area (cm^2) 2.55 cm^2 01/24/24 1412   Wound Volume (cm^3) 0.255 cm^3  "01/24/24 1412   Calculated Wound Volume (cm^3) 0.26 cm^3 01/24/24 1412   Change in Wound Size % 49.02 01/24/24 1412   Drainage Amount Small 01/24/24 1412   Drainage Description Yellow;Brown 01/24/24 1412   Non-staged Wound Description Full thickness 01/24/24 1412   Treatments Cleansed 01/10/24 1410   Patient Tolerance Tolerated well 01/17/24 1345   Dressing Status Intact 01/24/24 1412       Wound 01/10/24 Traumatic Leg Proximal;Right;Lower (Active)   Wound Image   01/24/24 1405   Wound Description Epithelialization;Pink;Yellow 01/24/24 1414   Joy-wound Assessment Edema;Pink;Scar Tissue 01/24/24 1414   Wound Length (cm) 14 cm 01/24/24 1414   Wound Width (cm) 4.5 cm 01/24/24 1414   Wound Depth (cm) 0.1 cm 01/24/24 1414   Wound Surface Area (cm^2) 63 cm^2 01/24/24 1414   Wound Volume (cm^3) 6.3 cm^3 01/24/24 1414   Calculated Wound Volume (cm^3) 6.3 cm^3 01/24/24 1414   Change in Wound Size % -2639.13 01/24/24 1414   Drainage Amount Small 01/24/24 1414   Drainage Description Serosanguineous;Yellow 01/24/24 1414   Non-staged Wound Description Full thickness 01/24/24 1414   Dressing Status Intact 01/24/24 1414                            Debridement   Wound 12/05/23 Venous Ulcer Leg Right;Medial;Lower    Universal Protocol:  Consent: Verbal consent obtained.  Consent given by: patient  Time out: Immediately prior to procedure a \"time out\" was called to verify the correct patient, procedure, equipment, support staff and site/side marked as required.  Patient understanding: patient states understanding of the procedure being performed  Patient identity confirmed: verbally with patient    Debridement Details  Performed by: PA  Debridement type: selective  Pain control: lidocaine 4%      Post-debridement measurements  Length (cm): 5.1  Width (cm): 0.5  Depth (cm): 0.1  Percent debrided: 10%  Surface Area (cm^2): 2.55  Area Debrided (cm^2): 0.26  Volume (cm^3): 0.26    Devitalized tissue debrided: edge epithelization loose " "creating undermining. Removed to allow for edge epithelization  Instrument(s) utilized: blade and forceps  Bleeding: small  Hemostasis obtained with: pressure  Procedural pain (0-10): 0  Post-procedural pain: 0   Response to treatment: procedure was tolerated well         Results from last 6 Months   Lab Units 11/23/23  1327   WOUND CULTURE  2+ Growth of Staphylococcus aureus*           Wound Instructions:  Orders Placed This Encounter   Procedures    Wound cleansing and dressings     Right Leg wound:  Shower NO or you may used a cast cover on the right lower leg and keep the dressing/wrap dry      Remove dressing from right lower leg and wash leg and ulcer with soap and water.   Apply Mupirocin then Adaptic to all open areas of the right leg, cover with gauze, Unna Boot, Coban and then Tubifast Blue  Change the dressing weekly at the [facility]      Unna Boot to the RLE     Keep compression wrap/wraps clean and dry. If wraps are too tight and you experience numbness/tingling, call the Wound Center. If after hours, remove wraps or proceed to nearest E.R. and call Wound Center in AM.      Avoid prolonged standing in one place.      Elevate leg(s) above the level of the heart when sitting or as much as possible.      Treatments completed as above        Wound culture completed today tot he RLE wound      Nurse visit on Friday for Unna Boot change     Standing Status:   Future     Standing Expiration Date:   1/24/2025    Debridement     This order was created via procedure documentation    Wound culture and Gram stain     Standing Status:   Future     Number of Occurrences:   1     Standing Expiration Date:   1/24/2025     Order Specific Question:   Release to patient through [employer]     Answer:   Immediate       Carmen Hernandez PA-C    Portions of the record may have been created with voice recognition software. Occasional wrong word or \"sound alike\" substitutions may have occurred due to the inherent limitations of voice " recognition software. Read the chart carefully and recognize, using context, where substitutions have occurred.

## 2024-01-24 NOTE — PATIENT INSTRUCTIONS
Orders Placed This Encounter   Procedures    Wound cleansing and dressings     Right Leg wound:  Shower NO or you may used a cast cover on the right lower leg and keep the dressing/wrap dry      Remove dressing from right lower leg and wash leg and ulcer with soap and water.   Apply Mupirocin then Adaptic to all open areas of the right leg, cover with gauze, Unna Boot, Coban and then Tubifast Blue  Change the dressing weekly at the [facility]      Johnny Linderot to the RLE     Keep compression wrap/wraps clean and dry. If wraps are too tight and you experience numbness/tingling, call the Wound Center. If after hours, remove wraps or proceed to nearest E.R. and call Wound Center in AM.      Avoid prolonged standing in one place.      Elevate leg(s) above the level of the heart when sitting or as much as possible.      Treatments completed as above        Wound culture completed today tot he RLE wound      Nurse visit on Friday for Johnny Lancaster change     Standing Status:   Future     Standing Expiration Date:   1/24/2025

## 2024-01-26 ENCOUNTER — OFFICE VISIT (OUTPATIENT)
Dept: WOUND CARE | Facility: CLINIC | Age: 64
End: 2024-01-26
Payer: COMMERCIAL

## 2024-01-26 VITALS
HEART RATE: 91 BPM | SYSTOLIC BLOOD PRESSURE: 139 MMHG | DIASTOLIC BLOOD PRESSURE: 89 MMHG | TEMPERATURE: 97.8 F | RESPIRATION RATE: 20 BRPM

## 2024-01-26 DIAGNOSIS — L97.911 NON-PRESSURE CHRONIC ULCER RIGHT LOWER LEG, LIMITED TO BREAKDOWN SKIN (HCC): Primary | ICD-10-CM

## 2024-01-26 PROCEDURE — 99214 OFFICE O/P EST MOD 30 MIN: CPT | Performed by: STUDENT IN AN ORGANIZED HEALTH CARE EDUCATION/TRAINING PROGRAM

## 2024-01-26 PROCEDURE — 29580 STRAPPING UNNA BOOT: CPT

## 2024-01-26 RX ORDER — DOXYCYCLINE HYCLATE 100 MG/1
100 CAPSULE ORAL EVERY 12 HOURS SCHEDULED
Qty: 14 CAPSULE | Refills: 0 | Status: SHIPPED | OUTPATIENT
Start: 2024-01-26 | End: 2024-02-02

## 2024-01-26 NOTE — PATIENT INSTRUCTIONS
Orders Placed This Encounter   Procedures    Cast Application     This order was created via procedure documentation    Wound cleansing and dressings     Right Leg wounds:  Shower NO or you may used a cast cover on the right lower leg and keep the dressing/wrap dry      Remove dressing from right lower leg and wash leg and ulcer with soap and water.   Apply Mupirocin then Adaptic to all open areas of the right leg, cover with gauze, Unna Boot, Coban and then Tubifast Blue  Change the dressing weekly at the [facility]      Unna Boot to the RLE     Keep compression wrap/wraps clean and dry. If wraps are too tight and you experience numbness/tingling, call the Wound Center. If after hours, remove wraps or proceed to nearest E.R. and call Wound Center in AM.      Avoid prolonged standing in one place.      Elevate leg(s) above the level of the heart when sitting or as much as possible.      Treatments completed as above       Antibiotic sent to your pharmacy--please  and take as directed     Standing Status:   Future     Standing Expiration Date:   1/26/2025

## 2024-01-26 NOTE — PROGRESS NOTES
Patient ID: Martha Payan is a 64 y.o. female Date of Birth 1960       Chief Complaint   Patient presents with    Follow Up Wound Care Visit     Right LE unna boot       Allergies:  Sulfamethoxazole-trimethoprim    Diagnosis:   Diagnosis ICD-10-CM Associated Orders   1. 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 Original venous ulceration of L medial ankle with healthy appearing granulation tissue and no periwound erythema present.   Given preliminary culture result demonstrated growth of Staphylcoccus aureus and concern of start of cellulitis will place pt on course of Doxycycline.   Continue with Mupirocin, adaptic, dry dressing and Unna boot for compression.   F/u in 3-4 days. Instructed to call if any questions or concerns arise in meantime.            Subjective:   12/07/23: 64 y/o F with PMHx of DM, breast CA, hypothyroidism, peripheral edema presents for evaluation of wound on her R leg which has been present for about two months. Pt notes she initially tried a cream to the area which did not help with healing; she recently presented to the ED for further evaluation on 11/23/23 at which time a culture was taken and she was started on Augmentin which was later changed to Keflex which patient recently completed her course of on 12/04/23. Currently antibioitic ointment and bandage are being applied to the open wound. Pt sleeps in a recliner; unfortunately she is no longer able to sleep in her bed after her  passed away. Notes she elevated her legs with cushions on the recliner and her legs are above the level of her heart. Obtains protein in her diet. Does not smoke. Has a history of working on her feet  for long hours at a time. Denies fevers, chills.     12/12/23: Pt presents for f/u of R lower leg venous ulceration. Polymem was recommended as a dressing at previous visit. It appears pt has been placing topical antibiotic ointment on polymem prior to applying the dressings this week. No significant changes in PMHx since previous visit. Denies fevers, chills.     12/19/23: Pt presents for f/u of RLE VSU. Currently Bacitracin is being utilized to anterior ulcers and Polymem to the medial ulceration along with Coflex lite for compression. Pt reports no problems with the mutlilayer compression wrap aside from some itching sensation. No color change, pain in toes or sliding of the wraps. Has been elevating her legs and taking diuretic as prescribed.     12/26/23: Pt presents for f/u of RLE VSU. Currently Bacitracin is being utilized to anterior ulcers and Polymem to the medial ulceration along with Coflex lite for compression. Overall no reports of complaints or significant changes since prior visit. Continues to tolerate wrap well.     01/03/24: Pt presents for f/u of RLE VSU. Currently Polymem and Coflex lite is being utilized for compression. Pt has been tolerating the wrap well and does not have any complaints today.       01/10/24: Pt presents for f/u of RLE VSU. Currently Hydrofera and regular Coflex are being used. She denies any significant discomfort with use of increased compression. Unfortunately she sustained a new traumatic wound to her R anterior shin that occurred yesterday after reaching for an object in her garage. Tetanus is up to date. She has been placing topical antibiotic ointment over the area.     01/17/24: Pt presents for f/u of RLE VSU and traumatic wound of R anterior shin. She has been consistently taking her antibiotic and has almost completed her course of Keflex. Denies any significant discomfort with the multilayer compression wrap.     01/24/24: Patient presents for follow-up of right  lower extremity venous ulceration.  She has had her Coflex wrap on consistently.  Notes that yesterday around 7 PM she started noticing pain on her leg and was anxious to get to her wound care appointment to see why her leg was hurting.  Denies falling.denies wrap shifting.  Denies wrap sliding down.  Is unsure of any inciting event that could have caused the pain.  No fevers or chills.  Is concerned about continued infection of her leg and is inquiring about the use of further antibiotics.    01/26/24: Pt presents for multilayer wrap change following previously appointment and check of leg given new multiple wounds. There is some increased redness and culture results demonstrated Staphylococcus aureus growth.           The following portions of the patient's history were reviewed and updated as appropriate:   There is no problem list on file for this patient.    Past Medical History:   Diagnosis Date    Breast cancer (HCC)     Hypothyroidism     Hypothyroidism     Kidney stones      Past Surgical History:   Procedure Laterality Date    TUBAL LIGATION       No family history on file.  Social History     Socioeconomic History    Marital status:      Spouse name: None    Number of children: None    Years of education: None    Highest education level: None   Occupational History    None   Tobacco Use    Smoking status: Never    Smokeless tobacco: Never   Vaping Use    Vaping status: Never Used   Substance and Sexual Activity    Alcohol use: Not Currently    Drug use: Never    Sexual activity: None   Other Topics Concern    None   Social History Narrative    None     Social Determinants of Health     Financial Resource Strain: Not on file   Food Insecurity: Not on file   Transportation Needs: Not on file   Physical Activity: Not on file   Stress: Not on file   Social Connections: Not on file   Intimate Partner Violence: Not on file   Housing Stability: Not on file       Current Outpatient Medications:      doxycycline hyclate (VIBRAMYCIN) 100 mg capsule, Take 1 capsule (100 mg total) by mouth every 12 (twelve) hours for 7 days, Disp: 14 capsule, Rfl: 0    benzonatate (TESSALON PERLES) 100 mg capsule, Take 1 capsule (100 mg total) by mouth every 8 (eight) hours, Disp: 21 capsule, Rfl: 0    clotrimazole (LOTRIMIN) 1 % cream, Apply to affected area 2 times daily for 4 weeks, Disp: 15 g, Rfl: 0    HYDROcodone-acetaminophen (Norco) 5-325 mg per tablet, Take 1 tablet by mouth every 6 (six) hours as needed for pain Max Daily Amount: 4 tablets, Disp: 15 tablet, Rfl: 0    ibuprofen (MOTRIN) 800 mg tablet, Take 1 tablet (800 mg total) by mouth every 8 (eight) hours as needed for mild pain (Patient not taking: Reported on 1/12/2022 ), Disp: 45 tablet, Rfl: 0    levothyroxine 88 mcg tablet, levothyroxine sodium 88 mcg tabs, Disp: , Rfl:     methylPREDNISolone 4 MG tablet therapy pack, Use as directed on package (Patient not taking: Reported on 1/12/2022 ), Disp: 21 tablet, Rfl: 0    torsemide (DEMADEX) 10 mg tablet, Take 10 mg by mouth daily, Disp: , Rfl:     Review of Systems   Constitutional:  Negative for chills and fever.   Respiratory:  Negative for shortness of breath.    Cardiovascular:  Positive for leg swelling.   Skin:  Positive for wound (RLE). Negative for color change.   Psychiatric/Behavioral:  Negative for agitation.          Objective:  /89   Pulse 91   Temp 97.8 °F (36.6 °C)   Resp 20   Pain Score: 0-No pain     Physical Exam  Vitals reviewed.   Cardiovascular:      Rate and Rhythm: Normal rate.      Pulses:           Dorsalis pedis pulses are 2+ on the right side.        Posterior tibial pulses are 2+ on the right side.   Pulmonary:      Effort: Pulmonary effort is normal. No respiratory distress.   Musculoskeletal:      Right lower leg: Edema present.      Left lower leg: Edema present.   Skin:     Findings: Wound present.             Comments: Multiple scattered partial thickness ulcerations with  granulation tissue on anterior shin with mild surrounding erythema. Original venous ulceration of L medial ankle with healthy appearing granulation tissue and no periwound erythema present.        Neurological:      Mental Status: She is alert.         Wound 12/05/23 Venous Ulcer Leg Right;Medial;Lower (Active)   Wound Image   01/24/24 1405   Wound Description Yellow;Pink;Epithelialization 01/24/24 1412   Joy-wound Assessment Edema;Pink;Scaly 01/24/24 1412   Wound Length (cm) 5.1 cm 01/24/24 1412   Wound Width (cm) 0.5 cm 01/24/24 1412   Wound Depth (cm) 0.1 cm 01/24/24 1412   Wound Surface Area (cm^2) 2.55 cm^2 01/24/24 1412   Wound Volume (cm^3) 0.255 cm^3 01/24/24 1412   Calculated Wound Volume (cm^3) 0.26 cm^3 01/24/24 1412   Change in Wound Size % 49.02 01/24/24 1412   Drainage Amount Small 01/24/24 1412   Drainage Description Yellow;Brown 01/24/24 1412   Non-staged Wound Description Full thickness 01/24/24 1412   Treatments Cleansed 01/10/24 1410   Patient Tolerance Tolerated well 01/17/24 1345   Dressing Status Intact 01/24/24 1412       Wound 01/10/24 Traumatic Leg Proximal;Right;Lower (Active)   Wound Image   01/24/24 1405   Wound Description Epithelialization;Pink;Yellow 01/24/24 1414   Joy-wound Assessment Edema;Pink;Scar Tissue 01/24/24 1414   Wound Length (cm) 14 cm 01/24/24 1414   Wound Width (cm) 4.5 cm 01/24/24 1414   Wound Depth (cm) 0.1 cm 01/24/24 1414   Wound Surface Area (cm^2) 63 cm^2 01/24/24 1414   Wound Volume (cm^3) 6.3 cm^3 01/24/24 1414   Calculated Wound Volume (cm^3) 6.3 cm^3 01/24/24 1414   Change in Wound Size % -2639.13 01/24/24 1414   Drainage Amount Small 01/24/24 1414   Drainage Description Serosanguineous;Yellow 01/24/24 1414   Non-staged Wound Description Full thickness 01/24/24 1414   Dressing Status Intact 01/24/24 1414                 Results from last 6 Months   Lab Units 01/24/24  1456   WOUND CULTURE  1+ Growth of Staphylococcus aureus*           Wound  "Instructions:  Orders Placed This Encounter   Procedures    Cast Application     This order was created via procedure documentation    Wound cleansing and dressings     Right Leg wounds:  Shower NO or you may used a cast cover on the right lower leg and keep the dressing/wrap dry      Remove dressing from right lower leg and wash leg and ulcer with soap and water.   Apply Mupirocin then Adaptic to all open areas of the right leg, cover with gauze, Unna Boot, Coban and then Tubifast Blue  Change the dressing weekly at the [facility]      Unna Boot to the RLE     Keep compression wrap/wraps clean and dry. If wraps are too tight and you experience numbness/tingling, call the Wound Center. If after hours, remove wraps or proceed to nearest E.R. and call Wound Center in AM.      Avoid prolonged standing in one place.      Elevate leg(s) above the level of the heart when sitting or as much as possible.      Treatments completed as above       Antibiotic sent to your pharmacy--please  and take as directed     Standing Status:   Future     Standing Expiration Date:   1/26/2025       Carmen Hernandez PA-C    Portions of the record may have been created with voice recognition software. Occasional wrong word or \"sound alike\" substitutions may have occurred due to the inherent limitations of voice recognition software. Read the chart carefully and recognize, using context, where substitutions have occurred.    "

## 2024-01-26 NOTE — PROGRESS NOTES
Cast Application    Date/Time: 1/26/2024 10:45 AM    Performed by: Dara Pozo RN  Authorized by: CHRISTIANO Nunes Protocol:  Consent: Verbal consent obtained.  Risks and benefits: risks, benefits and alternatives were discussed  Consent given by: patient  Patient understanding: patient states understanding of the procedure being performed  Patient identity confirmed: verbally with patient    Pre-procedure details:     Sensation:  Normal  Procedure details:     Laterality:  Right    Location:  Leg    Leg:  R lower legCast type:  Unna boot        Supplies:  Cotton padding  Post-procedure details:     Pain:  Unchanged    Sensation:  Normal    Patient tolerance of procedure:  Tolerated well, no immediate complications  Comments:      UNNA BOOT procedure     Before application, DAHIANA and/or TBI determined to be adequate for healing and application of compression. Lower extremity washed prior to application of compression wrap. With the foot in dorsiflexed position, Unna boot was applied as per physician orders without complications or complaint of pain.  The procedure was tolerated well. Toes warm & pink post application.  Patient provided education & reinforced to observe toes for any discoloration, swelling or tingling and instructed when to report to the Wound Center or to remove compression

## 2024-01-27 LAB
BACTERIA WND AEROBE CULT: ABNORMAL
GRAM STN SPEC: ABNORMAL

## 2024-01-31 ENCOUNTER — OFFICE VISIT (OUTPATIENT)
Dept: WOUND CARE | Facility: CLINIC | Age: 64
End: 2024-01-31
Payer: COMMERCIAL

## 2024-01-31 VITALS
SYSTOLIC BLOOD PRESSURE: 141 MMHG | HEART RATE: 88 BPM | TEMPERATURE: 98.1 F | RESPIRATION RATE: 18 BRPM | DIASTOLIC BLOOD PRESSURE: 85 MMHG

## 2024-01-31 DIAGNOSIS — L97.812 VENOUS STASIS ULCER OF OTHER PART OF RIGHT LOWER LEG WITH FAT LAYER EXPOSED, UNSPECIFIED WHETHER VARICOSE VEINS PRESENT (HCC): Primary | ICD-10-CM

## 2024-01-31 DIAGNOSIS — I83.018 VENOUS STASIS ULCER OF OTHER PART OF RIGHT LOWER LEG WITH FAT LAYER EXPOSED, UNSPECIFIED WHETHER VARICOSE VEINS PRESENT (HCC): Primary | ICD-10-CM

## 2024-01-31 DIAGNOSIS — S91.301A OPEN WOUND OF RIGHT FOOT, INITIAL ENCOUNTER: ICD-10-CM

## 2024-01-31 DIAGNOSIS — E11.69 TYPE 2 DIABETES MELLITUS WITH OTHER SPECIFIED COMPLICATION, WITHOUT LONG-TERM CURRENT USE OF INSULIN (HCC): ICD-10-CM

## 2024-01-31 DIAGNOSIS — L97.911 NON-PRESSURE CHRONIC ULCER RIGHT LOWER LEG, LIMITED TO BREAKDOWN SKIN (HCC): ICD-10-CM

## 2024-01-31 PROCEDURE — 99213 OFFICE O/P EST LOW 20 MIN: CPT | Performed by: STUDENT IN AN ORGANIZED HEALTH CARE EDUCATION/TRAINING PROGRAM

## 2024-01-31 PROCEDURE — 97597 DBRDMT OPN WND 1ST 20 CM/<: CPT | Performed by: STUDENT IN AN ORGANIZED HEALTH CARE EDUCATION/TRAINING PROGRAM

## 2024-01-31 RX ORDER — LIDOCAINE 40 MG/G
CREAM TOPICAL ONCE
Status: COMPLETED | OUTPATIENT
Start: 2024-01-31 | End: 2024-01-31

## 2024-01-31 RX ADMIN — LIDOCAINE: 40 CREAM TOPICAL at 13:05

## 2024-01-31 NOTE — PATIENT INSTRUCTIONS
Orders Placed This Encounter   Procedures    Wound cleansing and dressings     Wound cleansing and dressings     Right Leg wounds:  Shower NO or you may used a cast cover on the right lower leg and keep the dressing/wrap dry      Remove dressing from right lower leg and wash leg and ulcer with soap and water.   Apply Mupirocin then Adaptic to all open areas of the right leg, cover with gauze, Unna Boot, Coban and then Tubifast Blue  Change the dressing weekly at the [facility]      Unna Boot to the RLE     Keep compression wrap/wraps clean and dry. If wraps are too tight and you experience numbness/tingling, call the Wound Center. If after hours, remove wraps or proceed to nearest E.R. and call Wound Center in AM.      Avoid prolonged standing in one place.      Elevate leg(s) above the level of the heart when sitting or as much as possible.      Treatments completed as above      Finish the final day of your antibiotics     Standing Status:   Future     Standing Expiration Date:   1/31/2025       Lab: [id number] Lab: [id number]

## 2024-02-01 NOTE — PROGRESS NOTES
Patient ID: Martha Payan is a 64 y.o. female Date of Birth 1960       Chief Complaint   Patient presents with    Follow Up Wound Care Visit     RLE wound care       Allergies:  Sulfamethoxazole-trimethoprim    Diagnosis:   Diagnosis ICD-10-CM Associated Orders   1. Venous stasis ulcer of other part of right lower leg with fat layer exposed, unspecified whether varicose veins present ([facility])  I83.018 Wound cleansing and dressings    L97.812 lidocaine (LMX) 4 % cream     Debridement Venous Ulcer Right;Medial;Lower Leg      2. Non-pressure chronic ulcer right lower leg, limited to breakdown skin ([facility])  L97.911 Wound cleansing and dressings     lidocaine (LMX) 4 % cream      3. Type 2 diabetes mellitus with other specified complication, without long-term current use of insulin ([facility])  E11.69       4. Open wound of right foot, initial encounter  S91.301A            Assessment  & Plan:    Follow-up multiple ulcerations of the left lower extremity.  Partial-thickness ulcerations on the anterior aspect of the leg are now fully healed.  There is new epithelization at the site of the prior wounds and no drainage from the sites.  No confluent erythema to suggest persistent cellulitis.  Left medial venous ulceration is measuring smaller with exudate overlying granulation tissue.  Periwound with hyperpigmented scar tissue but no erythema to suggest infection.  New partial thickness wound from scratching on R dorsal foot. Wound bed is fibrogranular. Minimal drainage. No periwound erythema to suggest acute infection.   Selective debridement, as below.  Mupirocin with overlying Adaptic to open areas. Continue with Unna boot for compression. Should she experience discomfort in the foot from the wrap again recommend elevating legs for 15-20 minutes first and if no relieve office should be contacted.   Complete course of Doxycyline as prescribed.   Take diuretic as prescribed.    Elevate legs when resting and avoid prolonged  standing in one place. Avoid prolonged time with legs in dependent position.   F/u in one week. Instructed to call if any questions or concerns arise in meantime.         Subjective:   12/07/23: 64 y/o F with PMHx of DM, breast CA, hypothyroidism, peripheral edema presents for evaluation of wound on her R leg which has been present for about two months. Pt notes she initially tried a cream to the area which did not help with healing; she recently presented to the ED for further evaluation on 11/23/23 at which time a culture was taken and she was started on Augmentin which was later changed to Keflex which patient recently completed her course of on 12/04/23. Currently antibioitic ointment and bandage are being applied to the open wound. Pt sleeps in a recliner; unfortunately she is no longer able to sleep in her bed after her  passed away. Notes she elevated her legs with cushions on the recliner and her legs are above the level of her heart. Obtains protein in her diet. Does not smoke. Has a history of working on her feet for long hours at a time. Denies fevers, chills.     12/12/23: Pt presents for f/u of R lower leg venous ulceration. Polymem was recommended as a dressing at previous visit. It appears pt has been placing topical antibiotic ointment on polymem prior to applying the dressings this week. No significant changes in PMHx since previous visit. Denies fevers, chills.     12/19/23: Pt presents for f/u of RLE VSU. Currently Bacitracin is being utilized to anterior ulcers and Polymem to the medial ulceration along with Coflex lite for compression. Pt reports no problems with the mutlilayer compression wrap aside from some itching sensation. No color change, pain in toes or sliding of the wraps. Has been elevating her legs and taking diuretic as prescribed.     12/26/23: Pt presents for f/u of RLE VSU. Currently Bacitracin is being utilized to anterior ulcers and Polymem to the medial ulceration along  with Coflex lite for compression. Overall no reports of complaints or significant changes since prior visit. Continues to tolerate wrap well.     01/03/24: Pt presents for f/u of RLE VSU. Currently Polymem and Coflex lite is being utilized for compression. Pt has been tolerating the wrap well and does not have any complaints today.       01/10/24: Pt presents for f/u of RLE VSU. Currently Hydrofera and regular Coflex are being used. She denies any significant discomfort with use of increased compression. Unfortunately she sustained a new traumatic wound to her R anterior shin that occurred yesterday after reaching for an object in her garage. Tetanus is up to date. She has been placing topical antibiotic ointment over the area.     01/17/24: Pt presents for f/u of RLE VSU and traumatic wound of R anterior shin. She has been consistently taking her antibiotic and has almost completed her course of Keflex. Denies any significant discomfort with the multilayer compression wrap.     01/24/24: Patient presents for follow-up of right lower extremity venous ulceration.  She has had her Coflex wrap on consistently.  Notes that yesterday around 7 PM she started noticing pain on her leg and was anxious to get to her wound care appointment to see why her leg was hurting.  Denies falling.denies wrap shifting.  Denies wrap sliding down.  Is unsure of any inciting event that could have caused the pain.  No fevers or chills.  Is concerned about continued infection of her leg and is inquiring about the use of further antibiotics.    01/26/24: Pt presents for multilayer wrap change following previously appointment and check of leg given new multiple wounds. There is some increased redness and culture results demonstrated Staphylococcus aureus growth.     01/31/24: Pt presents for follow-up of venous ulcerations of the right lower extremity.  She has been taking doxycycline as prescribed.  Does not report any side effects of the  medication.  Only has 1 remaining pill to take.  She does mention that the multilayer compression wrap felt a bit tight around her foot and therefore she scratched underneath the area creating a new wound on her right dorsal foot.          The following portions of the patient's history were reviewed and updated as appropriate:   There is no problem list on file for this patient.    Past Medical History:   Diagnosis Date    Breast cancer (HCC)     Hypothyroidism     Hypothyroidism     Kidney stones      Past Surgical History:   Procedure Laterality Date    TUBAL LIGATION       No family history on file.  Social History     Socioeconomic History    Marital status:      Spouse name: Not on file    Number of children: Not on file    Years of education: Not on file    Highest education level: Not on file   Occupational History    Not on file   Tobacco Use    Smoking status: Never    Smokeless tobacco: Never   Vaping Use    Vaping status: Never Used   Substance and Sexual Activity    Alcohol use: Not Currently    Drug use: Never    Sexual activity: Not on file   Other Topics Concern    Not on file   Social History Narrative    Not on file     Social Determinants of Health     Financial Resource Strain: Not on file   Food Insecurity: Not on file   Transportation Needs: Not on file   Physical Activity: Not on file   Stress: Not on file   Social Connections: Not on file   Intimate Partner Violence: Not on file   Housing Stability: Not on file       Current Outpatient Medications:     benzonatate (TESSALON PERLES) 100 mg capsule, Take 1 capsule (100 mg total) by mouth every 8 (eight) hours, Disp: 21 capsule, Rfl: 0    clotrimazole (LOTRIMIN) 1 % cream, Apply to affected area 2 times daily for 4 weeks, Disp: 15 g, Rfl: 0    doxycycline hyclate (VIBRAMYCIN) 100 mg capsule, Take 1 capsule (100 mg total) by mouth every 12 (twelve) hours for 7 days, Disp: 14 capsule, Rfl: 0    HYDROcodone-acetaminophen (Norco) 5-325 mg per  tablet, Take 1 tablet by mouth every 6 (six) hours as needed for pain Max Daily Amount: 4 tablets, Disp: 15 tablet, Rfl: 0    ibuprofen (MOTRIN) 800 mg tablet, Take 1 tablet (800 mg total) by mouth every 8 (eight) hours as needed for mild pain (Patient not taking: Reported on 1/12/2022 ), Disp: 45 tablet, Rfl: 0    levothyroxine 88 mcg tablet, levothyroxine sodium 88 mcg tabs, Disp: , Rfl:     methylPREDNISolone 4 MG tablet therapy pack, Use as directed on package (Patient not taking: Reported on 1/12/2022 ), Disp: 21 tablet, Rfl: 0    torsemide (DEMADEX) 10 mg tablet, Take 10 mg by mouth daily, Disp: , Rfl:   No current facility-administered medications for this visit.    Review of Systems   Constitutional:  Negative for chills and fever.   Respiratory:  Negative for shortness of breath.    Cardiovascular:  Positive for leg swelling.   Skin:  Positive for wound (RLE). Negative for color change.   Psychiatric/Behavioral:  Negative for agitation.          Objective:  /85   Pulse 88   Temp 98.1 °F (36.7 °C)   Resp 18         Physical Exam  Vitals reviewed.   Cardiovascular:      Rate and Rhythm: Normal rate.      Pulses:           Dorsalis pedis pulses are 2+ on the right side.        Posterior tibial pulses are 2+ on the right side.   Pulmonary:      Effort: Pulmonary effort is normal. No respiratory distress.   Musculoskeletal:      Right lower leg: Edema present.      Left lower leg: Edema present.   Skin:     Findings: Wound present.             Comments: Multiple ulcerations of the left lower extremity.  Partial-thickness ulcerations on the anterior aspect of the leg are now fully healed.  There is new epithelization at the site of the prior wounds and no drainage from the sites.  No confluent erythema to suggest persistent cellulitis.  Left medial venous ulceration is measuring smaller with exudate overlying granulation tissue.  Periwound with hyperpigmented scar tissue but no erythema to suggest  "infection.    New partial thickness wound from scratching on R dorsal foot. Wound bed is fibrogranular. Minimal drainage. No periwound erythema to suggest acute infection.        Neurological:      Mental Status: She is alert.         Wound 12/05/23 Venous Ulcer Leg Right;Medial;Lower (Active)   Wound Image   01/31/24 1257   Wound Description Pink;Epithelialization 01/31/24 1257   Joy-wound Assessment Edema;Pink;Scaly 01/31/24 1257   Wound Length (cm) 0.5 cm 01/31/24 1257   Wound Width (cm) 0.4 cm 01/31/24 1257   Wound Depth (cm) 0.1 cm 01/31/24 1257   Wound Surface Area (cm^2) 0.2 cm^2 01/31/24 1257   Wound Volume (cm^3) 0.02 cm^3 01/31/24 1257   Calculated Wound Volume (cm^3) 0.02 cm^3 01/31/24 1257   Change in Wound Size % 96.08 01/31/24 1257   Drainage Amount Small 01/31/24 1257   Drainage Description Yellow;Brown 01/31/24 1257   Non-staged Wound Description Full thickness 01/31/24 1257   Treatments Cleansed 01/10/24 1410   Patient Tolerance Tolerated well 01/31/24 1257   Dressing Status Intact;Old drainage 01/31/24 1257       Wound 01/31/24 Skin Tear Skin tear Foot Anterior;Right (Active)   Wound Image   01/31/24 1258   Wound Description Clean;Pale;[place] 01/31/24 1258   Joy-wound Assessment Dry;Scaly 01/31/24 1258   Wound Length (cm) 0.1 cm 01/31/24 1258   Wound Width (cm) 0.1 cm 01/31/24 1258   Wound Depth (cm) 0.1 cm 01/31/24 1258   Wound Surface Area (cm^2) 0.01 cm^2 01/31/24 1258   Wound Volume (cm^3) 0.001 cm^3 01/31/24 1258   Calculated Wound Volume (cm^3) 0 cm^3 01/31/24 1258   Drainage Amount Scant 01/31/24 1258   Drainage Description Serosanguineous 01/31/24 1258   Non-staged Wound Description Partial thickness 01/31/24 1258   Dressing Status Intact;Old drainage 01/31/24 1258              Debridement   Wound 12/05/23 Venous Ulcer Leg Right;Medial;Lower    Universal Protocol:  Consent: Verbal consent obtained.  Consent given by: patient  Time out: Immediately prior to procedure a \"time out\" was called " to verify the correct patient, procedure, equipment, support staff and site/side marked as required.  Patient understanding: patient states understanding of the procedure being performed  Patient identity confirmed: verbally with patient    Debridement Details  Performed by: PA  Debridement type: selective  Pain control: lidocaine 4%      Post-debridement measurements  Length (cm): 0.5  Width (cm): 0.4  Depth (cm): 0.1  Percent debrided: 100%  Surface Area (cm^2): 0.2  Area Debrided (cm^2): 0.2  Volume (cm^3): 0.02    Devitalized tissue debrided: exudate  Instrument(s) utilized: curette  Bleeding: small  Hemostasis obtained with: pressure  Procedural pain (0-10): 0  Post-procedural pain: 0   Response to treatment: procedure was tolerated well         Results from last 6 Months   Lab Units 01/24/24  1456   WOUND CULTURE  1+ Growth of Staphylococcus aureus*           Wound Instructions:  Orders Placed This Encounter   Procedures    Wound cleansing and dressings     Wound cleansing and dressings     Right Leg wounds:  Shower NO or you may used a cast cover on the right lower leg and keep the dressing/wrap dry      Remove dressing from right lower leg and wash leg and ulcer with soap and water.   Apply Mupirocin then Adaptic to all open areas of the right leg, cover with gauze, Unna Boot, Coban and then Tubifast Blue  Change the dressing weekly at the [facility]      Unna Boot to the RLE     Keep compression wrap/wraps clean and dry. If wraps are too tight and you experience numbness/tingling, call the Wound Center. If after hours, remove wraps or proceed to nearest E.R. and call Wound Center in AM.      Avoid prolonged standing in one place.      Elevate leg(s) above the level of the heart when sitting or as much as possible.      Treatments completed as above      Finish the final day of your antibiotics     Standing Status:   Future     Standing Expiration Date:   1/31/2025    Debridement Venous Ulcer Right;Medial;Lower Leg  "    This order was created via procedure documentation       Cally Hernandez, PA-C    Portions of the record may have been created with voice recognition software. Occasional wrong word or \"sound alike\" substitutions may have occurred due to the inherent limitations of voice recognition software. Read the chart carefully and recognize, using context, where substitutions have occurred.    "

## 2024-02-07 ENCOUNTER — OFFICE VISIT (OUTPATIENT)
Dept: WOUND CARE | Facility: CLINIC | Age: 64
End: 2024-02-07
Payer: COMMERCIAL

## 2024-02-07 VITALS
HEART RATE: 98 BPM | SYSTOLIC BLOOD PRESSURE: 145 MMHG | DIASTOLIC BLOOD PRESSURE: 89 MMHG | RESPIRATION RATE: 16 BRPM | TEMPERATURE: 97.9 F

## 2024-02-07 DIAGNOSIS — I83.018 VENOUS STASIS ULCER OF OTHER PART OF RIGHT LOWER LEG WITH FAT LAYER EXPOSED, UNSPECIFIED WHETHER VARICOSE VEINS PRESENT (HCC): Primary | ICD-10-CM

## 2024-02-07 DIAGNOSIS — E11.69 TYPE 2 DIABETES MELLITUS WITH OTHER SPECIFIED COMPLICATION, WITHOUT LONG-TERM CURRENT USE OF INSULIN (HCC): ICD-10-CM

## 2024-02-07 DIAGNOSIS — L97.812 VENOUS STASIS ULCER OF OTHER PART OF RIGHT LOWER LEG WITH FAT LAYER EXPOSED, UNSPECIFIED WHETHER VARICOSE VEINS PRESENT (HCC): Primary | ICD-10-CM

## 2024-02-07 DIAGNOSIS — S91.301D OPEN WOUND OF RIGHT FOOT, SUBSEQUENT ENCOUNTER: ICD-10-CM

## 2024-02-07 PROCEDURE — 99212 OFFICE O/P EST SF 10 MIN: CPT | Performed by: STUDENT IN AN ORGANIZED HEALTH CARE EDUCATION/TRAINING PROGRAM

## 2024-02-07 NOTE — PATIENT INSTRUCTIONS
Orders Placed This Encounter   Procedures    Wound cleansing and dressings     Your wounds are healed today and you are discharged    Continue to walk 30-40 mintutes per day    Covered healed area to the Right medial area and leave in place for 2 days then may leave it ope to the air    Moisturize your legs each day    Continue to wear Compression to the legs each day to assist with swelling     Standing Status:   Future     Standing Expiration Date:   2/7/2025

## 2024-02-07 NOTE — PROGRESS NOTES
Patient ID: Martha Payan is a 64 y.o. female Date of Birth 1960       Chief Complaint   Patient presents with    Follow Up Wound Care Visit     RLE wounds---Unna Boot intact.       Allergies:  Sulfamethoxazole-trimethoprim    Diagnosis:   Diagnosis ICD-10-CM Associated Orders   1. Venous stasis ulcer of other part of right lower leg with fat layer exposed, unspecified whether varicose veins present ([facility])  I83.018 Wound cleansing and dressings    L97.812       2. Type 2 diabetes mellitus with other specified complication, without long-term current use of insulin ([facility])  E11.69       3. Open wound of right foot, subsequent encounter  S91.301D            Assessment  & Plan:    F/u VSU of the L medial lower leg. Is now healed. There is no drainage from the site nor signs of acute infection present.  Allevyn foam to the newly healed fragile skin for additional few days.   Continue with compression. Pt has 20-30 mmHg farrow wrap for use. Apply in morning. May remove to shower and at night if sleeping if legs remain elevated.   Moisturize skin daily to maintain healthy skin barrier.   Continue to elevate legs when resting. Ambulate for 30-40 minutes daily.   F/u wound of R dorsal foot. Small scab present. Following removal there is epithelialized skin underneath that is fully healed. No drainage or skins of acute infection.   D/c from wound care center. Call in future with any questions, concerns, future wounds.          Subjective:   12/07/23: 64 y/o F with PMHx of DM, breast CA, hypothyroidism, peripheral edema presents for evaluation of wound on her R leg which has been present for about two months. Pt notes she initially tried a cream to the area which did not help with healing; she recently presented to the ED for further evaluation on 11/23/23 at which time a culture was taken and she was started on Augmentin which was later changed to Keflex which patient recently completed her course of on 12/04/23. Currently  antibioitic ointment and bandage are being applied to the open wound. Pt sleeps in a recliner; unfortunately she is no longer able to sleep in her bed after her  passed away. Notes she elevated her legs with cushions on the recliner and her legs are above the level of her heart. Obtains protein in her diet. Does not smoke. Has a history of working on her feet for long hours at a time. Denies fevers, chills.     12/12/23: Pt presents for f/u of R lower leg venous ulceration. Polymem was recommended as a dressing at previous visit. It appears pt has been placing topical antibiotic ointment on polymem prior to applying the dressings this week. No significant changes in PMHx since previous visit. Denies fevers, chills.     12/19/23: Pt presents for f/u of RLE VSU. Currently Bacitracin is being utilized to anterior ulcers and Polymem to the medial ulceration along with Coflex lite for compression. Pt reports no problems with the mutlilayer compression wrap aside from some itching sensation. No color change, pain in toes or sliding of the wraps. Has been elevating her legs and taking diuretic as prescribed.     12/26/23: Pt presents for f/u of RLE VSU. Currently Bacitracin is being utilized to anterior ulcers and Polymem to the medial ulceration along with Coflex lite for compression. Overall no reports of complaints or significant changes since prior visit. Continues to tolerate wrap well.     01/03/24: Pt presents for f/u of RLE VSU. Currently Polymem and Coflex lite is being utilized for compression. Pt has been tolerating the wrap well and does not have any complaints today.       01/10/24: Pt presents for f/u of RLE VSU. Currently Hydrofera and regular Coflex are being used. She denies any significant discomfort with use of increased compression. Unfortunately she sustained a new traumatic wound to her R anterior shin that occurred yesterday after reaching for an object in her garage. Tetanus is up to date.  She has been placing topical antibiotic ointment over the area.     01/17/24: Pt presents for f/u of RLE VSU and traumatic wound of R anterior shin. She has been consistently taking her antibiotic and has almost completed her course of Keflex. Denies any significant discomfort with the multilayer compression wrap.     01/24/24: Patient presents for follow-up of right lower extremity venous ulceration.  She has had her Coflex wrap on consistently.  Notes that yesterday around 7 PM she started noticing pain on her leg and was anxious to get to her wound care appointment to see why her leg was hurting.  Denies falling.denies wrap shifting.  Denies wrap sliding down.  Is unsure of any inciting event that could have caused the pain.  No fevers or chills.  Is concerned about continued infection of her leg and is inquiring about the use of further antibiotics.    01/26/24: Pt presents for multilayer wrap change following previously appointment and check of leg given new multiple wounds. There is some increased redness and culture results demonstrated Staphylococcus aureus growth.     01/31/24: Pt presents for follow-up of venous ulcerations of the right lower extremity.  She has been taking doxycycline as prescribed.  Does not report any side effects of the medication.  Only has 1 remaining pill to take.  She does mention that the multilayer compression wrap felt a bit tight around her foot and therefore she scratched underneath the area creating a new wound on her right dorsal foot.    02/07/24: Pt presents for f/u VSU of the RLE. Has been doing well with multilayer compression wrap. Offers no complaints today.           The following portions of the patient's history were reviewed and updated as appropriate:   There is no problem list on file for this patient.    Past Medical History:   Diagnosis Date    Breast cancer (HCC)     Hypothyroidism     Hypothyroidism     Kidney stones      Past Surgical History:   Procedure  Laterality Date    TUBAL LIGATION       No family history on file.  Social History     Socioeconomic History    Marital status:      Spouse name: None    Number of children: None    Years of education: None    Highest education level: None   Occupational History    None   Tobacco Use    Smoking status: Never    Smokeless tobacco: Never   Vaping Use    Vaping status: Never Used   Substance and Sexual Activity    Alcohol use: Not Currently    Drug use: Never    Sexual activity: None   Other Topics Concern    None   Social History Narrative    None     Social Determinants of Health     Financial Resource Strain: Not on file   Food Insecurity: Not on file   Transportation Needs: Not on file   Physical Activity: Not on file   Stress: Not on file   Social Connections: Not on file   Intimate Partner Violence: Not on file   Housing Stability: Not on file       Current Outpatient Medications:     benzonatate (TESSALON PERLES) 100 mg capsule, Take 1 capsule (100 mg total) by mouth every 8 (eight) hours, Disp: 21 capsule, Rfl: 0    clotrimazole (LOTRIMIN) 1 % cream, Apply to affected area 2 times daily for 4 weeks, Disp: 15 g, Rfl: 0    HYDROcodone-acetaminophen (Norco) 5-325 mg per tablet, Take 1 tablet by mouth every 6 (six) hours as needed for pain Max Daily Amount: 4 tablets, Disp: 15 tablet, Rfl: 0    ibuprofen (MOTRIN) 800 mg tablet, Take 1 tablet (800 mg total) by mouth every 8 (eight) hours as needed for mild pain (Patient not taking: Reported on 1/12/2022 ), Disp: 45 tablet, Rfl: 0    levothyroxine 88 mcg tablet, levothyroxine sodium 88 mcg tabs, Disp: , Rfl:     methylPREDNISolone 4 MG tablet therapy pack, Use as directed on package (Patient not taking: Reported on 1/12/2022 ), Disp: 21 tablet, Rfl: 0    torsemide (DEMADEX) 10 mg tablet, Take 10 mg by mouth daily, Disp: , Rfl:     Review of Systems   Constitutional:  Negative for chills and fever.   Respiratory:  Negative for shortness of breath.     Cardiovascular:  Positive for leg swelling.   Skin:  Positive for wound (RLE). Negative for color change.   Psychiatric/Behavioral:  Negative for agitation.          Objective:  /89   Pulse 98   Temp 97.9 °F (36.6 °C)   Resp 16   Pain Score: 0-No pain     Physical Exam  Vitals reviewed.   Cardiovascular:      Rate and Rhythm: Normal rate.      Pulses:           Dorsalis pedis pulses are 2+ on the right side.        Posterior tibial pulses are 2+ on the right side.   Pulmonary:      Effort: Pulmonary effort is normal. No respiratory distress.   Musculoskeletal:      Right lower leg: Edema present.      Left lower leg: Edema present.   Skin:     Findings: Wound present.      Comments: VSU of the L medial lower leg. Is now healed. There is no drainage from the site nor signs of acute infection present.    R dorsal foot with small scab present. Following removal there is epithelialized skin underneath that is fully healed. No drainage or skins of acute infection.            Neurological:      Mental Status: She is alert.           Wound 12/05/23 Venous Ulcer Leg Right;Medial;Lower (Active)   Wound Image   02/07/24 1340   Wound Description Epithelialization 02/07/24 1345   Joy-wound Assessment Edema;Purple;Dry;Scaly 02/07/24 1345   Wound Length (cm) 0 cm 02/07/24 1345   Wound Width (cm) 0 cm 02/07/24 1345   Wound Depth (cm) 0 cm 02/07/24 1345   Wound Surface Area (cm^2) 0 cm^2 02/07/24 1345   Wound Volume (cm^3) 0 cm^3 02/07/24 1345   Calculated Wound Volume (cm^3) 0 cm^3 02/07/24 1345   Change in Wound Size % 100 02/07/24 1345   Drainage Amount None 02/07/24 1345   Drainage Description Yellow;Brown 01/31/24 1257   Non-staged Wound Description Not applicable 02/07/24 1345   Treatments Cleansed 02/07/24 1345   Patient Tolerance Tolerated well 01/31/24 1257   Dressing Status Intact;Old drainage 02/07/24 1345       Wound 01/31/24 Skin Tear Skin tear Foot Anterior;Right (Active)   Wound Image   02/07/24 1340  "  Wound Description Other (Comment);Dry 02/07/24 1346   Joy-wound Assessment Dry;Intact 02/07/24 1346   Wound Length (cm) 0 cm 02/07/24 1346   Wound Width (cm) 0 cm 02/07/24 1346   Wound Depth (cm) 0 cm 02/07/24 1346   Wound Surface Area (cm^2) 0 cm^2 02/07/24 1346   Wound Volume (cm^3) 0 cm^3 02/07/24 1346   Calculated Wound Volume (cm^3) 0 cm^3 02/07/24 1346   Drainage Amount None 02/07/24 1346   Drainage Description Serosanguineous 01/31/24 1258   Non-staged Wound Description Not applicable 02/07/24 1346   Dressing Status Dry;Intact;Old drainage 02/07/24 1346              Results from last 6 Months   Lab Units 01/24/24  1456   WOUND CULTURE  1+ Growth of Staphylococcus aureus*           Wound Instructions:  Orders Placed This Encounter   Procedures    Wound cleansing and dressings     Your wounds are healed today and you are discharged    Continue to walk 30-40 mintutes per day    Covered healed area to the Right medial area and leave in place for 2 days then may leave it ope to the air    Moisturize your legs each day    Continue to wear Compression to the legs each day to assist with swelling     Standing Status:   Future     Standing Expiration Date:   2/7/2025       Carmen Hernandez PA-C        Portions of the record may have been created with voice recognition software. Occasional wrong word or \"sound alike\" substitutions may have occurred due to the inherent limitations of voice recognition software. Read the chart carefully and recognize, using context, where substitutions have occurred.    "

## 2024-07-11 ENCOUNTER — OFFICE VISIT (OUTPATIENT)
Facility: HOSPITAL | Age: 64
End: 2024-07-11
Payer: COMMERCIAL

## 2024-07-11 VITALS
RESPIRATION RATE: 18 BRPM | TEMPERATURE: 96.7 F | HEART RATE: 88 BPM | DIASTOLIC BLOOD PRESSURE: 64 MMHG | SYSTOLIC BLOOD PRESSURE: 130 MMHG | BODY MASS INDEX: 38.98 KG/M2 | WEIGHT: 220 LBS | HEIGHT: 63 IN

## 2024-07-11 DIAGNOSIS — I87.2 VENOUS STASIS ULCER OF OTHER PART OF RIGHT FOOT LIMITED TO BREAKDOWN OF SKIN WITHOUT VARICOSE VEINS (HCC): ICD-10-CM

## 2024-07-11 DIAGNOSIS — L03.115 CELLULITIS OF RIGHT LOWER LEG: ICD-10-CM

## 2024-07-11 DIAGNOSIS — E11.69 TYPE 2 DIABETES MELLITUS WITH OTHER SPECIFIED COMPLICATION, WITHOUT LONG-TERM CURRENT USE OF INSULIN (HCC): ICD-10-CM

## 2024-07-11 DIAGNOSIS — I83.018 VENOUS STASIS ULCER OF OTHER PART OF RIGHT LOWER LEG LIMITED TO BREAKDOWN OF SKIN, UNSPECIFIED WHETHER VARICOSE VEINS PRESENT (HCC): Primary | ICD-10-CM

## 2024-07-11 DIAGNOSIS — L97.821 VENOUS STASIS ULCER OF OTHER PART OF LEFT LOWER LEG LIMITED TO BREAKDOWN OF SKIN, UNSPECIFIED WHETHER VARICOSE VEINS PRESENT (HCC): ICD-10-CM

## 2024-07-11 DIAGNOSIS — L03.116 CELLULITIS OF LEFT LOWER LEG: ICD-10-CM

## 2024-07-11 DIAGNOSIS — L97.811 VENOUS STASIS ULCER OF OTHER PART OF RIGHT LOWER LEG LIMITED TO BREAKDOWN OF SKIN, UNSPECIFIED WHETHER VARICOSE VEINS PRESENT (HCC): Primary | ICD-10-CM

## 2024-07-11 DIAGNOSIS — I83.028 VENOUS STASIS ULCER OF OTHER PART OF LEFT LOWER LEG LIMITED TO BREAKDOWN OF SKIN, UNSPECIFIED WHETHER VARICOSE VEINS PRESENT (HCC): ICD-10-CM

## 2024-07-11 DIAGNOSIS — L97.511 VENOUS STASIS ULCER OF OTHER PART OF RIGHT FOOT LIMITED TO BREAKDOWN OF SKIN WITHOUT VARICOSE VEINS (HCC): ICD-10-CM

## 2024-07-11 PROCEDURE — 99214 OFFICE O/P EST MOD 30 MIN: CPT | Performed by: STUDENT IN AN ORGANIZED HEALTH CARE EDUCATION/TRAINING PROGRAM

## 2024-07-11 PROCEDURE — 11045 DBRDMT SUBQ TISS EACH ADDL: CPT | Performed by: STUDENT IN AN ORGANIZED HEALTH CARE EDUCATION/TRAINING PROGRAM

## 2024-07-11 PROCEDURE — 11042 DBRDMT SUBQ TIS 1ST 20SQCM/<: CPT | Performed by: STUDENT IN AN ORGANIZED HEALTH CARE EDUCATION/TRAINING PROGRAM

## 2024-07-11 RX ORDER — LIDOCAINE 40 MG/G
CREAM TOPICAL ONCE
Status: COMPLETED | OUTPATIENT
Start: 2024-07-11 | End: 2024-07-11

## 2024-07-11 RX ORDER — CEPHALEXIN 500 MG/1
500 CAPSULE ORAL EVERY 8 HOURS SCHEDULED
Qty: 30 CAPSULE | Refills: 0 | Status: SHIPPED | OUTPATIENT
Start: 2024-07-11 | End: 2024-07-21

## 2024-07-11 RX ADMIN — LIDOCAINE 1 APPLICATION: 40 CREAM TOPICAL at 09:45

## 2024-07-11 NOTE — PROGRESS NOTES
Patient ID: Martha Payan is a 64 y.o. female Date of Birth 1960       Chief Complaint   Patient presents with    New Patient Visit     Admission to the [facility] Center today for wounds to the right foot and B/L LE's.         Allergies:  Sulfamethoxazole-trimethoprim    Diagnosis:   Diagnosis ICD-10-CM Associated Orders   1. Venous stasis ulcer of other part of right lower leg limited to breakdown of skin, unspecified whether varicose veins present (formerly [facility])  I83.018 lidocaine (LMX) 4 % cream    L97.811 Wound cleansing and dressings Right;Lower Leg     Wound cleansing and dressings     cephalexin (KEFLEX) 500 mg capsule     Wound Procedure Treatment Right;Lower Leg     Debridement Right;Lower Leg      2. Venous stasis ulcer of other part of left lower leg limited to breakdown of skin, unspecified whether varicose veins present (formerly [facility])  I83.028 lidocaine (LMX) 4 % cream    L97.821 Wound cleansing and dressings Right;Lower Leg     Wound cleansing and dressings     Wound Procedure Treatment      3. Venous stasis ulcer of other part of right foot limited to breakdown of skin without varicose veins (formerly [facility])  I87.2 lidocaine (LMX) 4 % cream    L97.511 Wound cleansing and dressings Right;Lower Leg     Wound cleansing and dressings     Wound Procedure Treatment      4. Cellulitis of right lower leg  L03.115 cephalexin (KEFLEX) 500 mg capsule      5. Cellulitis of left lower leg  L03.116       6. Type 2 diabetes mellitus with other specified complication, without long-term current use of insulin (formerly [facility])  E11.69            Assessment  & Plan:    Initial evaluation of bilateral lower extremity venous ulcerations. RLE with multiple scattered ulcerations with slough present overlying fibrinous ulcer bases. Drainage is moderate. Periwound with diffuse bright erythema consistent with cellulitis. Small ulceration on R dorsal foot with firbogranular ulcer base and small serous drainage. LLE with small ulceration with a fibrgroanular ulcer  base, drainage is small. LLE with significant erythema consistent with cellulitis.   Surgical debridement, as below.   Silver alginate (gelling fiber) to ulcerations of R lower leg. Medihoney to R foot and L leg. Change every other day. May cleanse wounds with gentle soap and water on days of dressing changes. Avoid harsh cleansers such as hydrogen peroxide. Avoid soaking her legs in any standing body of water.   Spandagrip for compression. Plan to increase compression when evidence of acute infection has resolved. Discussed the dressing we have picked is more absorptive and therefore daily changes are not necessary. Also discussed that her ulcerations are related to venous disease which is chronic and maintained with compression, her ulcerations returned d/t her discontinuing use of compression contributing to her legs swelling again leading to ulceration.   Keflex prescribed for bilateral cellulitis leg . Instructed to monitor for any changes including redness or swelling surrounding the wound, increased drainage or pain as well as fevers or chills.    Obtain 3-4 servings of protein daily for wound healing.    A1C results reviewed with the patient today. Well controlled at 5.5 as of eight months prior.   F/u in one week. Instructed to call if any questions or concerns arise in meantime.            Subjective:   07/12/24: 65 y/o F with PMHx of DM, breast CA, hypothyroidism, peripheral edema presents for evaluation of venous ulcerations of bilateral lower legs. She was previously seen by the wound care center and was healed in February. She reports that she was healed for about one month and then started noticing her legs ulcerations again; she reports she worse her compression for approximately one month after being healed in February and then d/c use of compression. She reports being very frustrated that she had just underwent a long process with wound care so recently and was upset the ulcers returned therefore  she did not want to come back to wound care immediately and tried to treat the ulcers on her own with a variety of creams. She was visited by her daughter in July whom saw pt's legs and how red they were and suggested she go to urgent care for urgent evaluation. Pt presented to urgent care on 07/07/24 and was diagnosed with bilateral leg cellulitis and was started on Clindamycin. Pt notes that after two days of taking Clindamycin she noticed a heavy feeling in her chest and therefore her PCP d/c the Clindamycin and started her on Levaquin on 07/09/24 which she has been taking since. It was also recommended that she apply Bacitracin ointment and cleanse her wounds daily. She would like to know why her wounds have returned and how we can cure her. Is questioning why we previously had her in multilayer wraps that were changed weekly when she was recently told she should be changing her dressings daily.            The following portions of the patient's history were reviewed and updated as appropriate:   There is no problem list on file for this patient.    Past Medical History:   Diagnosis Date    Breast cancer (HCC)     Hypothyroidism     Hypothyroidism     Kidney stones      Past Surgical History:   Procedure Laterality Date    TUBAL LIGATION       No family history on file.  Social History     Socioeconomic History    Marital status:      Spouse name: None    Number of children: None    Years of education: None    Highest education level: None   Occupational History    None   Tobacco Use    Smoking status: Never    Smokeless tobacco: Never   Vaping Use    Vaping status: Never Used   Substance and Sexual Activity    Alcohol use: Not Currently    Drug use: Never    Sexual activity: None   Other Topics Concern    None   Social History Narrative    None     Social Determinants of Health     Financial Resource Strain: Not on file   Food Insecurity: Not on file   Transportation Needs: Not on file   Physical  "Activity: Not on file   Stress: Not on file   Social Connections: Not on file   Intimate Partner Violence: Not on file   Housing Stability: Not on file       Current Outpatient Medications:     cephalexin (KEFLEX) 500 mg capsule, Take 1 capsule (500 mg total) by mouth every 8 (eight) hours for 10 days, Disp: 30 capsule, Rfl: 0    levothyroxine 88 mcg tablet, levothyroxine sodium 88 mcg tabs, Disp: , Rfl:     benzonatate (TESSALON PERLES) 100 mg capsule, Take 1 capsule (100 mg total) by mouth every 8 (eight) hours (Patient not taking: Reported on 7/11/2024), Disp: 21 capsule, Rfl: 0    clotrimazole (LOTRIMIN) 1 % cream, Apply to affected area 2 times daily for 4 weeks (Patient not taking: Reported on 7/11/2024), Disp: 15 g, Rfl: 0    HYDROcodone-acetaminophen (Norco) 5-325 mg per tablet, Take 1 tablet by mouth every 6 (six) hours as needed for pain Max Daily Amount: 4 tablets (Patient not taking: Reported on 7/11/2024), Disp: 15 tablet, Rfl: 0    ibuprofen (MOTRIN) 800 mg tablet, Take 1 tablet (800 mg total) by mouth every 8 (eight) hours as needed for mild pain (Patient not taking: Reported on 1/12/2022), Disp: 45 tablet, Rfl: 0    methylPREDNISolone 4 MG tablet therapy pack, Use as directed on package (Patient not taking: Reported on 1/12/2022), Disp: 21 tablet, Rfl: 0    torsemide (DEMADEX) 10 mg tablet, Take 10 mg by mouth daily, Disp: , Rfl:     Review of Systems   Constitutional:  Negative for chills and fever.   Respiratory:  Negative for shortness of breath.    Cardiovascular:  Positive for leg swelling.   Skin:  Positive for wound (RLE). Negative for color change.   Psychiatric/Behavioral:  Negative for agitation.          Objective:  /64   Pulse 88   Temp (!) 96.7 °F (35.9 °C)   Resp 18   Ht 5' 3\" (1.6 m)   Wt 99.8 kg (220 lb)   BMI 38.97 kg/m²   Pain Score: 0-No pain     Physical Exam  Vitals reviewed.   Cardiovascular:      Rate and Rhythm: Normal rate.      Pulses:           Dorsalis pedis " pulses are 2+ on the right side and 2+ on the left side.        Posterior tibial pulses are 2+ on the right side and 2+ on the left side.   Pulmonary:      Effort: Pulmonary effort is normal. No respiratory distress.   Musculoskeletal:      Right lower leg: Edema present.      Left lower leg: Edema present.   Skin:     Findings: Erythema (bilateral lower legs) and wound present.          Neurological:      Mental Status: She is alert.   Psychiatric:         Mood and Affect: Affect is tearful.           Wound 07/11/24 Leg Right;Lower (Active)   Wound Image   07/11/24 1037   Wound Description Epithelialization;Slough;Yellow;Pink 07/11/24 1003   Joy-wound Assessment Edema;Erythema 07/11/24 1003   Wound Length (cm) 9 cm 07/11/24 1003   Wound Width (cm) 10 cm 07/11/24 1003   Wound Depth (cm) 0.1 cm 07/11/24 1003   Wound Surface Area (cm^2) 90 cm^2 07/11/24 1003   Wound Volume (cm^3) 9 cm^3 07/11/24 1003   Calculated Wound Volume (cm^3) 9 cm^3 07/11/24 1003   Drainage Amount Moderate 07/11/24 1003   Drainage Description Serous;Yellow 07/11/24 1003   Non-staged Wound Description Full thickness 07/11/24 1003   Treatments Cleansed 07/11/24 1003   Dressing Status Intact 07/11/24 1003       Wound 07/11/24 Foot Right;Dorsal (Active)   Wound Image   07/11/24 0940   Wound Description Slough;Pink 07/11/24 1002   Joy-wound Assessment Edema;Pink 07/11/24 1002   Wound Length (cm) 0.5 cm 07/11/24 1002   Wound Width (cm) 0.5 cm 07/11/24 1002   Wound Depth (cm) 0.1 cm 07/11/24 1002   Wound Surface Area (cm^2) 0.25 cm^2 07/11/24 1002   Wound Volume (cm^3) 0.025 cm^3 07/11/24 1002   Calculated Wound Volume (cm^3) 0.03 cm^3 07/11/24 1002   Drainage Amount Small 07/11/24 1002   Drainage Description Serous;Yellow 07/11/24 1002   Non-staged Wound Description Full thickness 07/11/24 1002   Treatments Cleansed 07/11/24 1002   Dressing Status Intact 07/11/24 1002       Wound 07/11/24 Leg Left;Lower (Active)   Wound Image   07/11/24 0900  "  Wound Description Yellow;Pink;Slough 07/11/24 1001   Joy-wound Assessment Edema;Erythema 07/11/24 1001   Wound Length (cm) 0.7 cm 07/11/24 1001   Wound Width (cm) 0.5 cm 07/11/24 1001   Wound Depth (cm) 0.1 cm 07/11/24 1001   Wound Surface Area (cm^2) 0.35 cm^2 07/11/24 1001   Wound Volume (cm^3) 0.035 cm^3 07/11/24 1001   Calculated Wound Volume (cm^3) 0.04 cm^3 07/11/24 1001   Drainage Amount Small 07/11/24 1001   Drainage Description Serous;Yellow 07/11/24 1001   Non-staged Wound Description Full thickness 07/11/24 1001   Treatments Cleansed 07/11/24 1001   Dressing Status Intact 07/11/24 1001                            Debridement   Wound 07/11/24 Leg Right;Lower    Universal Protocol:  Consent: Verbal consent obtained.  Consent given by: patient  Time out: Immediately prior to procedure a \"time out\" was called to verify the correct patient, procedure, equipment, support staff and site/side marked as required.  Patient understanding: patient states understanding of the procedure being performed  Patient identity confirmed: verbally with patient    Debridement Details  Performed by: PA  Debridement type: surgical  Level of debridement: subcutaneous tissue  Pain control: lidocaine 4%      Post-debridement measurements  Length (cm): 9  Width (cm): 10  Depth (cm): 0.2  Percent debrided: 30%  Surface Area (cm^2): 90  Area Debrided (cm^2): 27  Volume (cm^3): 18    Tissue and other material debrided: dermis and subcutaneous tissue  Devitalized tissue debrided: slough  Instrument(s) utilized: curette  Bleeding: small  Hemostasis obtained with: pressure  Response to treatment: procedure was not tolerated well  Debridement Comments: Pt had significant discomfort with debridement but no complication with procedure       Results from last 6 Months   Lab Units 01/24/24  2086   WOUND CULTURE  1+ Growth of Staphylococcus aureus*           Wound Instructions:  Orders Placed This Encounter   Procedures    Wound cleansing and " dressings Right;Lower Leg     Right Lower Leg open ulcers:  Wash your hands with soap and water.  Remove old dressing, discard into plastic bag and place in trash.    Cleanse the wound with mild soap and water--Dove- rinse well and pat dry prior to applying a clean dressing.     Shower yes  on the days you change your dressings    Apply Gelling Fiber dressing with Silver to the open areas  Cover with gauze/ABD pad, yared and tape  Change dressing every other day and as needed for excessive drainage      Elastic Tubular Stocking Spandagrip Size F to both lower legs    Tubular elastic bandage: Apply from base of toes to behind the knee. Apply in AM, may remove for sleep.    Avoid prolonged standing in one place.    Elevate leg(s) above the level of the heart when sitting or as much as possible.       Please stop taking the Levaquin     Please  the antibiotic from your pharmacy and start taking as prescribed      We will order supplies with [employer], they will be sent to your house     Standing Status:   Future     Standing Expiration Date:   7/18/2024    Wound cleansing and dressings     Right Foot and Left Lower Leg:  Wash your hands with soap and water.  Remove old dressing, discard into plastic bag and place in trash.    Cleanse the wound with mild soap and water--Dove- rinse well and pat dry prior to applying a clean dressing.     Shower yes  on the days you change your dressings    Apply a small amount of Medihoney to the open area, cover with gauze, yared and tape  Change the dressing every other day and as needed     Standing Status:   Future     Standing Expiration Date:   7/18/2024    Wound Procedure Treatment Right;Lower Leg     This order was created via procedure documentation    Wound Procedure Treatment     This order was created via procedure documentation    Debridement Right;Lower Leg     This order was created via procedure documentation       Cally Hernandez, PA-C      Portions of the record  "may have been created with voice recognition software. Occasional wrong word or \"sound alike\" substitutions may have occurred due to the inherent limitations of voice recognition software. Read the chart carefully and recognize, using context, where substitutions have occurred.    "

## 2024-07-11 NOTE — PROGRESS NOTES
Wound Procedure Treatment Right;Lower Leg    Performed by: Dara Pozo RN  Authorized by: Carmen Hernandez PA-C    Associated wounds:   Wound 07/11/24 Leg Right;Lower  Wound cleansed with:  NSS  Applied primary dressing:  Gelling fiber and Silver  Applied secondary dressing:  ABD  Dressing secured with:  Pedro, Tape, Elastic tubular stocking and Size F

## 2024-07-11 NOTE — PATIENT INSTRUCTIONS
Orders Placed This Encounter   Procedures    Wound cleansing and dressings Right;Lower Leg     Right Lower Leg open ulcers:  Wash your hands with soap and water.  Remove old dressing, discard into plastic bag and place in trash.    Cleanse the wound with mild soap and water--Dove- rinse well and pat dry prior to applying a clean dressing.     Shower yes  on the days you change your dressings    Apply Gelling Fiber dressing with Silver to the open areas  Cover with gauze/ABD pad, yared and tape  Change dressing every other day and as needed for excessive drainage      Elastic Tubular Stocking Spandagrip Size F to both lower legs    Tubular elastic bandage: Apply from base of toes to behind the knee. Apply in AM, may remove for sleep.    Avoid prolonged standing in one place.    Elevate leg(s) above the level of the heart when sitting or as much as possible.       Please stop taking the Levaquin     Please  the antibiotic from your pharmacy and start taking as prescribed      We will order supplies with [facility], they will be sent to your house     Standing Status:   Future     Standing Expiration Date:   7/18/2024    Wound cleansing and dressings     Right Foot and Left Lower Leg:  Wash your hands with soap and water.  Remove old dressing, discard into plastic bag and place in trash.    Cleanse the wound with mild soap and water--Dove- rinse well and pat dry prior to applying a clean dressing.     Shower yes  on the days you change your dressings    Apply a small amount of Medihoney to the open area, cover with gauze, yared and tape  Change the dressing every other day and as needed     Standing Status:   Future     Standing Expiration Date:   7/18/2024

## 2024-07-11 NOTE — PROGRESS NOTES
Wound Procedure Treatment    Performed by: Dara Pozo RN  Authorized by: Carmen Hernandez PA-C    Associated wounds:   Wound 07/11/24 Foot Right;Dorsal  Wound 07/11/24 Leg Left;Lower  Wound cleansed with:  NSS  Applied Topical: Medihoney gel    Applied secondary dressing:  Gauze  Dressing secured with:  Pedro, Tape, Elastic tubular stocking and Size F

## 2024-07-18 ENCOUNTER — OFFICE VISIT (OUTPATIENT)
Facility: HOSPITAL | Age: 64
End: 2024-07-18
Payer: COMMERCIAL

## 2024-07-18 VITALS
RESPIRATION RATE: 18 BRPM | SYSTOLIC BLOOD PRESSURE: 110 MMHG | DIASTOLIC BLOOD PRESSURE: 70 MMHG | TEMPERATURE: 97.5 F | HEART RATE: 78 BPM

## 2024-07-18 DIAGNOSIS — L97.821 VENOUS STASIS ULCER OF OTHER PART OF LEFT LOWER LEG LIMITED TO BREAKDOWN OF SKIN, UNSPECIFIED WHETHER VARICOSE VEINS PRESENT (HCC): ICD-10-CM

## 2024-07-18 DIAGNOSIS — E11.69 TYPE 2 DIABETES MELLITUS WITH OTHER SPECIFIED COMPLICATION, WITHOUT LONG-TERM CURRENT USE OF INSULIN (HCC): ICD-10-CM

## 2024-07-18 DIAGNOSIS — I83.028 VENOUS STASIS ULCER OF OTHER PART OF LEFT LOWER LEG LIMITED TO BREAKDOWN OF SKIN, UNSPECIFIED WHETHER VARICOSE VEINS PRESENT (HCC): ICD-10-CM

## 2024-07-18 DIAGNOSIS — L97.812 VENOUS STASIS ULCER OF OTHER PART OF RIGHT LOWER LEG WITH FAT LAYER EXPOSED, UNSPECIFIED WHETHER VARICOSE VEINS PRESENT (HCC): Primary | ICD-10-CM

## 2024-07-18 DIAGNOSIS — L03.115 CELLULITIS OF RIGHT LOWER LEG: ICD-10-CM

## 2024-07-18 DIAGNOSIS — I83.018 VENOUS STASIS ULCER OF OTHER PART OF RIGHT LOWER LEG WITH FAT LAYER EXPOSED, UNSPECIFIED WHETHER VARICOSE VEINS PRESENT (HCC): Primary | ICD-10-CM

## 2024-07-18 DIAGNOSIS — L97.511 VENOUS STASIS ULCER OF OTHER PART OF RIGHT FOOT LIMITED TO BREAKDOWN OF SKIN WITHOUT VARICOSE VEINS (HCC): ICD-10-CM

## 2024-07-18 DIAGNOSIS — I87.2 VENOUS STASIS ULCER OF OTHER PART OF RIGHT FOOT LIMITED TO BREAKDOWN OF SKIN WITHOUT VARICOSE VEINS (HCC): ICD-10-CM

## 2024-07-18 PROCEDURE — 99214 OFFICE O/P EST MOD 30 MIN: CPT | Performed by: STUDENT IN AN ORGANIZED HEALTH CARE EDUCATION/TRAINING PROGRAM

## 2024-07-18 RX ORDER — ABEMACICLIB 100 MG/1
1 TABLET ORAL 2 TIMES DAILY
COMMUNITY
Start: 2024-05-28

## 2024-07-18 RX ORDER — SENNOSIDES 8.6 MG
650 CAPSULE ORAL EVERY 8 HOURS PRN
COMMUNITY

## 2024-07-18 RX ORDER — LIDOCAINE 40 MG/G
CREAM TOPICAL ONCE
Status: COMPLETED | OUTPATIENT
Start: 2024-07-18 | End: 2024-07-18

## 2024-07-18 RX ORDER — ANASTROZOLE 1 MG/1
1 TABLET ORAL DAILY
COMMUNITY

## 2024-07-18 RX ADMIN — LIDOCAINE 1 APPLICATION: 40 CREAM TOPICAL at 13:12

## 2024-07-18 NOTE — PATIENT INSTRUCTIONS
Orders Placed This Encounter   Procedures    Wound cleansing and dressings Right;Lower Leg     Right Lower Leg open ulcers:  Wash your hands with soap and water.  Remove old dressing, discard into plastic bag and place in trash.    Cleanse the wound with mild soap and water--Dove- rinse well and pat dry prior to applying a clean dressing.      Shower yes  on the days you change your dressings     Apply Gelling Fiber dressing with Silver to the open areas  Cover with gauze/ABD pad, yared and tape  Change dressing every other day and as needed for excessive drainage        Elastic Tubular Stocking Spandagrip Size F to both lower legs     Tubular elastic bandage: Apply from base of toes to behind the knee. Apply in AM, may remove for sleep.     Avoid prolonged standing in one place.     Elevate leg(s) above the level of the heart when sitting or as much as possible.      Monitor right lower leg redness for worsening symptoms--redness was marked today, please note if the redness begins to spread past the marker, redness becomes darker, foul odor, please go to your nearest ER for evaluation.     Standing Status:   Future     Standing Expiration Date:   7/25/2024    Wound cleansing and dressings     Right Foot and Left Lower Leg:  Wash your hands with soap and water.  Remove old dressing, discard into plastic bag and place in trash.    Cleanse the wound with mild soap and water--Dove- rinse well and pat dry prior to applying a clean dressing.      Shower yes  on the days you change your dressings     Apply a small amount of Medihoney to the open area, cover with gauze, yared and tape  Change the dressing every other day and as needed     Standing Status:   Future     Standing Expiration Date:   7/25/2024

## 2024-07-18 NOTE — PROGRESS NOTES
Patient ID: Martha Payan is a 64 y.o. female Date of Birth 1960       Chief Complaint   Patient presents with    Follow Up Wound Care Visit     B/L LE wounds       Allergies:  Sulfamethoxazole-trimethoprim    Diagnosis:   Diagnosis ICD-10-CM Associated Orders   1. Venous stasis ulcer of other part of right lower leg with fat layer exposed, unspecified whether varicose veins present ([facility])  I83.018 lidocaine (LMX) 4 % cream    L97.812 Wound cleansing and dressings Right;Lower Leg     Wound cleansing and dressings      2. Venous stasis ulcer of other part of left lower leg limited to breakdown of skin, unspecified whether varicose veins present ([facility])  I83.028 lidocaine (LMX) 4 % cream    L97.821 Wound cleansing and dressings Right;Lower Leg     Wound cleansing and dressings      3. Venous stasis ulcer of other part of right foot limited to breakdown of skin without varicose veins ([facility])  I87.2     L97.511       4. Type 2 diabetes mellitus with other specified complication, without long-term current use of insulin ([facility])  E11.69       5. Cellulitis of right lower leg  L03.115            Assessment  & Plan:    F/u bilateral lower extremity venous ulcerations. RLE continues to have multiple scattered ulcerations with slough present overlying the ulcer bases. Drainage is moderate. No significant improvement in measurements. Her lower extremity erythema has improved but has not fully resolved, there is increased warmth of the tissues. No lymphangitic streaking or systemic sx.   Pt declines debridement today.   Continue with Keflex given improvement in erythema. Sterile marker was used to demarcate erythematous skin today. Discussed with patient that if the erythema of her leg does not improve or begins to spread beyond marked area its imperative she present to the ED for further evaluation as IV abx would be necessary in the case of failed oral antibiotics.   Continue with current orders of silver alginate, ABD and  Spandagrip for compression. Will hold off on multilayer compression wrap until cellulitis has resolved.   Elevate legs when resting. Avoid prolonged standing in one place.   Small ulceration on the R dorsal foot is dry. Ulcer base is fibrogranular. No periwound erythema. Small VSU on the L anterior lower leg with exudate that was easily mechanically debrided from the ulcer base. Minimal drainage. Periwound erythema has improved surrounding the L VSU.   Medihoney to both ulcerations. Keep covered. Change every other day.   Pt is obtaining second opinion from [facility] wound center next week. Call should she decided to get back on the schedule here following her appointment next week and we would be happy to get her back on the schedule here.          Subjective:   07/12/24: 63 y/o F with PMHx of DM, breast CA, hypothyroidism, peripheral edema presents for evaluation of venous ulcerations of bilateral lower legs. She was previously seen by the wound care center and was healed in February. She reports that she was healed for about one month and then started noticing her legs ulcerations again; she reports she worse her compression for approximately one month after being healed in February and then d/c use of compression. She reports being very frustrated that she had just underwent a long process with wound care so recently and was upset the ulcers returned therefore she did not want to come back to wound care immediately and tried to treat the ulcers on her own with a variety of creams. She was visited by her daughter in July whom saw pt's legs and how red they were and suggested she go to urgent care for urgent evaluation. Pt presented to urgent care on 07/07/24 and was diagnosed with bilateral leg cellulitis and was started on Clindamycin. Pt notes that after two days of taking Clindamycin she noticed a heavy feeling in her chest and therefore her PCP d/c the Clindamycin and started her on Levaquin on 07/09/24  which she has been taking since. It was also recommended that she apply Bacitracin ointment and cleanse her wounds daily. She would like to know why her wounds have returned and how we can cure her. Is questioning why we previously had her in multilayer wraps that were changed weekly when she was recently told she should be changing her dressings daily.      07/18/24: Pt presents for f/u of bilateral lower extremity venous ulcerations. She reports she received her supplies and has been using silver alginate changed every other day with Spandagrips for compression. She has been taking her Keflex as prescribed and reports the redness in her legs has improved from prior visit. Reports her children are concern that her ulcerations returned/were not cured with prior wound care therefore they have made an appointment at [facility] for her for a second opinion regarding management. She denies fevers, malaise. Reports her RLE is tender today therefore she does not wish to have a debridement performed.           The following portions of the patient's history were reviewed and updated as appropriate:   There is no problem list on file for this patient.    Past Medical History:   Diagnosis Date    Breast cancer (HCC)     Hypothyroidism     Hypothyroidism     Kidney stones      Past Surgical History:   Procedure Laterality Date    TUBAL LIGATION       No family history on file.  Social History     Socioeconomic History    Marital status:      Spouse name: Not on file    Number of children: Not on file    Years of education: Not on file    Highest education level: Not on file   Occupational History    Not on file   Tobacco Use    Smoking status: Never    Smokeless tobacco: Never   Vaping Use    Vaping status: Never Used   Substance and Sexual Activity    Alcohol use: Not Currently    Drug use: Never    Sexual activity: Not on file   Other Topics Concern    Not on file   Social History Narrative    Not on file      Social Determinants of Health     Financial Resource Strain: Not on file   Food Insecurity: Not on file   Transportation Needs: Not on file   Physical Activity: Not on file   Stress: Not on file   Social Connections: Not on file   Intimate Partner Violence: Not on file   Housing Stability: Not on file       Current Outpatient Medications:     acetaminophen (TYLENOL) 650 mg CR tablet, Take 650 mg by mouth every 8 (eight) hours as needed for mild pain, Disp: , Rfl:     anastrozole (ARIMIDEX) 1 mg tablet, Take 1 mg by mouth daily, Disp: , Rfl:     cephalexin (KEFLEX) 500 mg capsule, Take 1 capsule (500 mg total) by mouth every 8 (eight) hours for 10 days, Disp: 30 capsule, Rfl: 0    levothyroxine 88 mcg tablet, Take 50 mcg by mouth daily, Disp: , Rfl:     Multiple Vitamins-Minerals (CENTRUM SILVER 50+WOMEN PO), Take 1 tablet by mouth daily, Disp: , Rfl:     Verzenio 100 MG TABS, Take 1 tablet by mouth 2 (two) times a day, Disp: , Rfl:     benzonatate (TESSALON PERLES) 100 mg capsule, Take 1 capsule (100 mg total) by mouth every 8 (eight) hours (Patient not taking: Reported on 7/11/2024), Disp: 21 capsule, Rfl: 0    clotrimazole (LOTRIMIN) 1 % cream, Apply to affected area 2 times daily for 4 weeks (Patient not taking: Reported on 7/11/2024), Disp: 15 g, Rfl: 0    HYDROcodone-acetaminophen (Norco) 5-325 mg per tablet, Take 1 tablet by mouth every 6 (six) hours as needed for pain Max Daily Amount: 4 tablets (Patient not taking: Reported on 7/11/2024), Disp: 15 tablet, Rfl: 0    ibuprofen (MOTRIN) 800 mg tablet, Take 1 tablet (800 mg total) by mouth every 8 (eight) hours as needed for mild pain (Patient not taking: Reported on 1/12/2022), Disp: 45 tablet, Rfl: 0    methylPREDNISolone 4 MG tablet therapy pack, Use as directed on package (Patient not taking: Reported on 1/12/2022), Disp: 21 tablet, Rfl: 0    torsemide (DEMADEX) 10 mg tablet, Take 10 mg by mouth daily, Disp: , Rfl:   No current facility-administered  medications for this visit.    Review of Systems   Constitutional:  Negative for chills and fever.   Respiratory:  Negative for shortness of breath.    Cardiovascular:  Positive for leg swelling.   Skin:  Positive for color change (redness of legs are improving) and wound (RLE).   Psychiatric/Behavioral:  Negative for agitation.          Objective:  /70   Pulse 78   Temp 97.5 °F (36.4 °C)   Resp 18   Pain Score:   8     Physical Exam  Vitals reviewed.   Cardiovascular:      Rate and Rhythm: Normal rate.      Pulses:           Dorsalis pedis pulses are 2+ on the right side and 2+ on the left side.        Posterior tibial pulses are 2+ on the right side and 2+ on the left side.   Pulmonary:      Effort: Pulmonary effort is normal. No respiratory distress.   Musculoskeletal:      Right lower leg: Edema present.      Left lower leg: Edema present.   Skin:     Findings: Erythema (bilateral lower legs) and wound present.             Comments: RLE continues to have multiple scattered ulcerations with slough present overlying the ulcer bases. Drainage is moderate. No significant improvement in measurements. Her lower extremity erythema has improved but has not fully resolved, there is increased warmth of the tissues. No lymphangitic streaking or systemic sx.       · Small ulceration on the R dorsal foot is dry. Ulcer base is fibrogranular. No periwound erythema. Small VSU on the L anterior lower leg with exudate that was easily mechanically debrided from the ulcer base. Minimal drainage. Periwound erythema has improved surrounding the L VSU.        Neurological:      Mental Status: She is alert.   Psychiatric:         Mood and Affect: Affect is tearful.                Wound 07/11/24 Leg Right;Lower (Active)   Wound Image   07/18/24 1314   Wound Description Slough;Epithelialization;Pink;Yellow 07/18/24 1311   Joy-wound Assessment Edema;Erythema 07/18/24 1311   Wound Length (cm) 7 cm 07/18/24 1311   Wound Width (cm)  8 cm 07/18/24 1311   Wound Depth (cm) 0.1 cm 07/18/24 1311   Wound Surface Area (cm^2) 56 cm^2 07/18/24 1311   Wound Volume (cm^3) 5.6 cm^3 07/18/24 1311   Calculated Wound Volume (cm^3) 5.6 cm^3 07/18/24 1311   Change in Wound Size % 37.78 07/18/24 1311   Drainage Amount Moderate 07/18/24 1311   Drainage Description Serous;Yellow 07/18/24 1311   Non-staged Wound Description Full thickness 07/18/24 1311   Treatments Cleansed 07/18/24 1311   Dressing Status Intact 07/11/24 1003       Wound 07/11/24 Foot Right;Dorsal (Active)   Wound Image   07/18/24 1313   Wound Description Epithelialization 07/18/24 1310   Joy-wound Assessment Edema 07/18/24 1310   Wound Length (cm) 0.1 cm 07/18/24 1310   Wound Width (cm) 0.1 cm 07/18/24 1310   Wound Depth (cm) 0 cm 07/18/24 1310   Wound Surface Area (cm^2) 0.01 cm^2 07/18/24 1310   Wound Volume (cm^3) 0 cm^3 07/18/24 1310   Calculated Wound Volume (cm^3) 0 cm^3 07/18/24 1310   Change in Wound Size % 100 07/18/24 1310   Drainage Amount None 07/18/24 1310   Drainage Description Serous;Yellow 07/11/24 1002   Non-staged Wound Description Not applicable 07/18/24 1310   Treatments Cleansed 07/11/24 1002   Dressing Status Intact 07/11/24 1002       Wound 07/11/24 Leg Left;Lower (Active)   Wound Image   07/18/24 1313   Wound Description [place] 07/18/24 1310   Joy-wound Assessment Edema 07/18/24 1310   Wound Length (cm) 0.4 cm 07/18/24 1310   Wound Width (cm) 0.4 cm 07/18/24 1310   Wound Depth (cm) 0.1 cm 07/18/24 1310   Wound Surface Area (cm^2) 0.16 cm^2 07/18/24 1310   Wound Volume (cm^3) 0.016 cm^3 07/18/24 1310   Calculated Wound Volume (cm^3) 0.02 cm^3 07/18/24 1310   Change in Wound Size % 50 07/18/24 1310   Drainage Amount Scant 07/18/24 1310   Drainage Description Serous 07/18/24 1310   Non-staged Wound Description Full thickness 07/18/24 1310   Treatments Cleansed 07/11/24 1001   Dressing Status Intact 07/11/24 1001           Results from last 6 Months   Lab Units 01/24/24  [id number]    WOUND CULTURE  1+ Growth of Staphylococcus aureus*           Wound Instructions:  Orders Placed This Encounter   Procedures    Wound cleansing and dressings Right;Lower Leg     Right Lower Leg open ulcers:  Wash your hands with soap and water.  Remove old dressing, discard into plastic bag and place in trash.    Cleanse the wound with mild soap and water--Dove- rinse well and pat dry prior to applying a clean dressing.      Shower yes  on the days you change your dressings     Apply Gelling Fiber dressing with Silver to the open areas  Cover with gauze/ABD pad, yared and tape  Change dressing every other day and as needed for excessive drainage        Elastic Tubular Stocking Spandagrip Size F to both lower legs     Tubular elastic bandage: Apply from base of toes to behind the knee. Apply in AM, may remove for sleep.     Avoid prolonged standing in one place.     Elevate leg(s) above the level of the heart when sitting or as much as possible.      Monitor right lower leg redness for worsening symptoms--redness was marked today, please note if the redness begins to spread past the marker, redness becomes darker, foul odor, please go to your nearest ER for evaluation.     Standing Status:   Future     Standing Expiration Date:   7/25/2024    Wound cleansing and dressings     Right Foot and Left Lower Leg:  Wash your hands with soap and water.  Remove old dressing, discard into plastic bag and place in trash.    Cleanse the wound with mild soap and water--Dove- rinse well and pat dry prior to applying a clean dressing.      Shower yes  on the days you change your dressings     Apply a small amount of Medihoney to the open area, cover with gauze, yared and tape  Change the dressing every other day and as needed     Standing Status:   Future     Standing Expiration Date:   7/25/2024       Carmen Hernandez PA-C      Portions of the record may have been created with voice recognition software. Occasional wrong word or  "\"sound alike\" substitutions may have occurred due to the inherent limitations of voice recognition software. Read the chart carefully and recognize, using context, where substitutions have occurred.    "

## 2024-09-05 ENCOUNTER — OFFICE VISIT (OUTPATIENT)
Facility: HOSPITAL | Age: 64
End: 2024-09-05
Payer: COMMERCIAL

## 2024-09-05 VITALS
BODY MASS INDEX: 36.02 KG/M2 | RESPIRATION RATE: 18 BRPM | WEIGHT: 211 LBS | HEART RATE: 77 BPM | DIASTOLIC BLOOD PRESSURE: 70 MMHG | HEIGHT: 64 IN | TEMPERATURE: 96.6 F | SYSTOLIC BLOOD PRESSURE: 157 MMHG

## 2024-09-05 DIAGNOSIS — L97.812 VENOUS STASIS ULCER OF OTHER PART OF RIGHT LOWER LEG WITH FAT LAYER EXPOSED, UNSPECIFIED WHETHER VARICOSE VEINS PRESENT (HCC): Primary | ICD-10-CM

## 2024-09-05 DIAGNOSIS — I83.018 VENOUS STASIS ULCER OF OTHER PART OF RIGHT LOWER LEG WITH FAT LAYER EXPOSED, UNSPECIFIED WHETHER VARICOSE VEINS PRESENT (HCC): Primary | ICD-10-CM

## 2024-09-05 PROCEDURE — 99214 OFFICE O/P EST MOD 30 MIN: CPT | Performed by: STUDENT IN AN ORGANIZED HEALTH CARE EDUCATION/TRAINING PROGRAM

## 2024-09-05 PROCEDURE — 11042 DBRDMT SUBQ TIS 1ST 20SQCM/<: CPT | Performed by: STUDENT IN AN ORGANIZED HEALTH CARE EDUCATION/TRAINING PROGRAM

## 2024-09-05 RX ORDER — SERTRALINE HYDROCHLORIDE 25 MG/1
25 TABLET, FILM COATED ORAL DAILY
COMMUNITY
Start: 2024-08-29 | End: 2025-08-29

## 2024-09-05 RX ORDER — ASPIRIN 81 MG/1
81 TABLET, CHEWABLE ORAL DAILY
COMMUNITY
Start: 2024-08-28 | End: 2025-08-28

## 2024-09-05 RX ORDER — ATORVASTATIN CALCIUM 40 MG/1
40 TABLET, FILM COATED ORAL DAILY
COMMUNITY
Start: 2024-08-28 | End: 2025-08-28

## 2024-09-05 NOTE — PATIENT INSTRUCTIONS
Orders Placed This Encounter   Procedures    Wound cleansing and dressings Venous Ulcer Distal;Right Leg     Right lower medial leg:    Wash your hands with soap and water.  Remove old dressing, discard into plastic bag and place in trash.    Cleanse the wound with unscented soap (such as plain white Dove soap) and warm water prior to applying a clean dressing. Do not use tissue or cotton balls.   Do not scrub the wound. Pat dry using gauze.      Shower - yes       Carmen ALVARADO will order a new prescription ointment called SANTYL. The specialty pharmacy will call you and follow up with you regarding the quoted price. Please be aware the price may be vary.      Wound care for the time being:  Apply Medihoney to the right lower leg wound.  Cover with ABD pad  Secure with rolled gauze and tape  Change dressing daily.         Please call the [facility] Monday through Friday between the hours of 8:00 AM and 4:30 PM at [phone] if you have any questions, if you experience any major changes in your wound(s) or for any signs or symptoms of infection such as fever; changes in the redness, swelling, drainage, or odor of your wound. After hours, weekends or holidays please contact your primary care physician or go to the hospital emergency room.          Standing Status:   Future     Standing Expiration Date:   9/12/2024

## 2024-09-05 NOTE — PROGRESS NOTES
Patient ID: Martha Payan is a 64 y.o. female Date of Birth 1960       Chief Complaint   Patient presents with    New Patient Visit     New patient visit today for wound to the RLE.         Allergies:  Sulfamethoxazole-trimethoprim    Diagnosis:   Diagnosis ICD-10-CM Associated Orders   1. Venous stasis ulcer of other part of right lower leg with fat layer exposed, unspecified whether varicose veins present ([facility])  I83.018 Wound cleansing and dressings Venous Ulcer Distal;Right Leg    L97.812 collagenase (SANTYL) ointment     Debridement Venous Ulcer Distal;Right Leg           Assessment  & Plan:    Venous ulceration of the RLE. There is a layer of biofilm present overlying granulation tissue. Drainage is small. No periwound erythema to indicate acute soft tissue infection.   Surgical debridement performed, only a small % of wound debrided d/t pt discomfort and inability to tolerate debridement of the entire site. Will prescribe Santyl enzymatic debridement. Medihoney applied today in office.   Spandagrip for compression for time being given daily dressing changes with Santyl.  Notify office should the drainage increase or surrounding skin become wet, red or begin to have a rash.  Elevate legs when resting.  Avoid prolonged standing 1 place.  Instructed to monitor for any changes including redness or swelling surrounding the wound, increased drainage or pain as well as fevers or chills.    F/u in one week. Instructed to call if any questions or concerns arise in meantime.          Subjective:   07/12/24: 65 y/o F with PMHx of DM, breast CA, hypothyroidism, peripheral edema presents for evaluation of venous ulcerations of bilateral lower legs. She was previously seen by the wound care center and was healed in February. She reports that she was healed for about one month and then started noticing her legs ulcerations again; she reports she worse her compression for approximately one month after being healed in  February and then d/c use of compression. She reports being very frustrated that she had just underwent a long process with wound care so recently and was upset the ulcers returned therefore she did not want to come back to wound care immediately and tried to treat the ulcers on her own with a variety of creams. She was visited by her daughter in July whom saw pt's legs and how red they were and suggested she go to urgent care for urgent evaluation. Pt presented to urgent care on 07/07/24 and was diagnosed with bilateral leg cellulitis and was started on Clindamycin. Pt notes that after two days of taking Clindamycin she noticed a heavy feeling in her chest and therefore her PCP d/c the Clindamycin and started her on Levaquin on 07/09/24 which she has been taking since. It was also recommended that she apply Bacitracin ointment and cleanse her wounds daily. She would like to know why her wounds have returned and how we can cure her. Is questioning why we previously had her in multilayer wraps that were changed weekly when she was recently told she should be changing her dressings daily.      07/18/24: Pt presents for f/u of bilateral lower extremity venous ulcerations. She reports she received her supplies and has been using silver alginate changed every other day with Spandagrips for compression. She has been taking her Keflex as prescribed and reports the redness in her legs has improved from prior visit. Reports her children are concern that her ulcerations returned/were not cured with prior wound care therefore they have made an appointment at [facility] for her for a second opinion regarding management. She denies fevers, malaise. Reports her RLE is tender today therefore she does not wish to have a debridement performed.     09/05/24: Pt presents for f/u of bilateral lower extremity ulcerations. She sought a second opinion at [facility] and triad paste with compression stockings was  "being utilized however she wished to return here for care. Reports tenderness in the RLE wound. Reports her LLE is now healed without any ulcerations. Unfortunately had a stroke since her prior visit; no obvious residual deficits in terms of weakness but reports her brain is occasionally feeling \"foggy\"; is working to establish a f/u visit with neurology. Reports her license has been taken away.           The following portions of the patient's history were reviewed and updated as appropriate:   There is no problem list on file for this patient.    Past Medical History:   Diagnosis Date    Breast cancer (HCC)     Hypothyroidism     Hypothyroidism     Kidney stones      Past Surgical History:   Procedure Laterality Date    TUBAL LIGATION       No family history on file.  Social History     Socioeconomic History    Marital status:      Spouse name: None    Number of children: None    Years of education: None    Highest education level: None   Occupational History    None   Tobacco Use    Smoking status: Never    Smokeless tobacco: Never   Vaping Use    Vaping status: Never Used   Substance and Sexual Activity    Alcohol use: Not Currently    Drug use: Never    Sexual activity: None   Other Topics Concern    None   Social History Narrative    None     Social Determinants of Health     Financial Resource Strain: High Risk (8/27/2024)    Received from [facility]    Overall Financial Resource Strain (CARDIA)     Difficulty of Paying Living Expenses: Very hard   Food Insecurity: Food Insecurity Present (8/27/2024)    Received from [facility]    Hunger Vital Sign     Worried About Running Out of Food in the Last Year: Sometimes true     Ran Out of Food in the Last Year: Sometimes true   Transportation Needs: No Transportation Needs (8/27/2024)    Received from [facility]    PRAPARE - Transportation     Lack of Transportation (Medical): No     Lack of " Transportation (Non-Medical): No   Physical Activity: Not on file   Stress: Not on file   Social Connections: Not on file   Intimate Partner Violence: Patient Unable To Answer (8/27/2024)    Received from [facility]    Humiliation, Afraid, Rape, and Kick questionnaire     Fear of Current or Ex-Partner: Patient unable to answer     Emotionally Abused: Patient unable to answer     Physically Abused: Patient unable to answer     Sexually Abused: Patient unable to answer   Housing Stability: Low Risk  (8/27/2024)    Received from [facility]    Housing Stability Vital Sign     Unable to Pay for Housing in the Last Year: No     Number of Times Moved in the Last Year: 0     Homeless in the Last Year: No       Current Outpatient Medications:     aspirin 81 mg chewable tablet, Chew 81 mg daily, Disp: , Rfl:     atorvastatin (LIPITOR) 40 mg tablet, Take 40 mg by mouth daily, Disp: , Rfl:     collagenase (SANTYL) ointment, Apply topically daily To wound of the RLE, Disp: 90 g, Rfl: 1    sertraline (ZOLOFT) 25 mg tablet, Take 25 mg by mouth daily, Disp: , Rfl:     acetaminophen (TYLENOL) 650 mg CR tablet, Take 650 mg by mouth every 8 (eight) hours as needed for mild pain, Disp: , Rfl:     anastrozole (ARIMIDEX) 1 mg tablet, Take 1 mg by mouth daily, Disp: , Rfl:     benzonatate (TESSALON PERLES) 100 mg capsule, Take 1 capsule (100 mg total) by mouth every 8 (eight) hours (Patient not taking: Reported on 7/11/2024), Disp: 21 capsule, Rfl: 0    clotrimazole (LOTRIMIN) 1 % cream, Apply to affected area 2 times daily for 4 weeks (Patient not taking: Reported on 7/11/2024), Disp: 15 g, Rfl: 0    HYDROcodone-acetaminophen (Norco) 5-325 mg per tablet, Take 1 tablet by mouth every 6 (six) hours as needed for pain Max Daily Amount: 4 tablets (Patient not taking: Reported on 7/11/2024), Disp: 15 tablet, Rfl: 0    ibuprofen (MOTRIN) 800 mg tablet, Take 1 tablet (800 mg total) by mouth every 8 (eight)  "hours as needed for mild pain (Patient not taking: Reported on 1/12/2022), Disp: 45 tablet, Rfl: 0    levothyroxine 88 mcg tablet, Take 50 mcg by mouth daily, Disp: , Rfl:     methylPREDNISolone 4 MG tablet therapy pack, Use as directed on package (Patient not taking: Reported on 1/12/2022), Disp: 21 tablet, Rfl: 0    Multiple Vitamins-Minerals (CENTRUM SILVER 50+WOMEN PO), Take 1 tablet by mouth daily, Disp: , Rfl:     torsemide (DEMADEX) 10 mg tablet, Take 10 mg by mouth daily, Disp: , Rfl:     Verzenio 100 MG TABS, Take 1 tablet by mouth 2 (two) times a day, Disp: , Rfl:     Review of Systems   Constitutional:  Negative for chills and fever.   Respiratory:  Negative for shortness of breath.    Cardiovascular:  Positive for leg swelling.   Skin:  Positive for wound (RLE).   Neurological:         + \"brain fog\"         Objective:  /70   Pulse 77   Temp (!) 96.6 °F (35.9 °C)   Resp 18   Ht 5' 4\" (1.626 m)   Wt 95.7 kg (211 lb)   BMI 36.22 kg/m²   Pain Score: 0-No pain     Physical Exam  Vitals reviewed.   Cardiovascular:      Rate and Rhythm: Normal rate.      Pulses:           Dorsalis pedis pulses are 2+ on the right side and 2+ on the left side.        Posterior tibial pulses are 2+ on the right side and 2+ on the left side.   Pulmonary:      Effort: Pulmonary effort is normal. No respiratory distress.   Musculoskeletal:      Right lower leg: Edema present.      Left lower leg: Edema present.   Skin:     Findings: Wound present. No erythema.      Comments: KESHAV continues to have multiple scattered ulcerations with slough present overlying the ulcer bases. Drainage is moderate. No significant improvement in measurements. Her lower extremity erythema has improved but has not fully resolved, there is increased warmth of the tissues. No lymphangitic streaking or systemic sx.       · Small ulceration on the R dorsal foot is dry. Ulcer base is fibrogranular. No periwound erythema. Small VSU on the L anterior " "lower leg with exudate that was easily mechanically debrided from the ulcer base. Minimal drainage. Periwound erythema has improved surrounding the L VSU.        Neurological:      Mental Status: She is alert.   Psychiatric:         Mood and Affect: Affect is tearful.         Wound 09/05/24 Venous Ulcer Leg Distal;Right (Active)   Wound Image   09/05/24 0936   Wound Description Pale;Pink;White;Yellow;Swelling 09/05/24 0936   Joy-wound Assessment Pink 09/05/24 0936   Wound Length (cm) 3.7 cm 09/05/24 0936   Wound Width (cm) 2.7 cm 09/05/24 0936   Wound Depth (cm) 0.1 cm 09/05/24 0936   Wound Surface Area (cm^2) 9.99 cm^2 09/05/24 0936   Wound Volume (cm^3) 0.999 cm^3 09/05/24 0936   Calculated Wound Volume (cm^3) 1 cm^3 09/05/24 0936   Drainage Amount Moderate 09/05/24 0936   Drainage Description Serosanguineous 09/05/24 0936   Non-staged Wound Description Full thickness 09/05/24 0936   Dressing Status Intact;Old drainage 09/05/24 0936                  Debridement   Wound 09/05/24 Venous Ulcer Leg Distal;Right    Universal Protocol:  procedure performed by consultantConsent: Verbal consent obtained.  Consent given by: patient  Time out: Immediately prior to procedure a \"time out\" was called to verify the correct patient, procedure, equipment, support staff and site/side marked as required.  Patient understanding: patient states understanding of the procedure being performed  Patient identity confirmed: verbally with patient    Debridement Details  Performed by: PA  Debridement type: surgical  Level of debridement: subcutaneous tissue  Pain control: lidocaine 4%      Post-debridement measurements  Length (cm): 3.7  Width (cm): 2.7  Depth (cm): 0.2  Percent debrided: 5%  Surface Area (cm^2): 9.99  Area Debrided (cm^2): 0.5  Volume (cm^3): 2    Tissue and other material debrided: dermis and subcutaneous tissue  Devitalized tissue debrided: biofilm  Instrument(s) utilized: curette  Bleeding: small  Hemostasis obtained " "with: pressure  Response to treatment: procedure was not tolerated well  Debridement Comments: Small amount of debridement performed, was d/c d/t pt request related to pain.                  Wound Instructions:  Orders Placed This Encounter   Procedures    Wound cleansing and dressings Venous Ulcer Distal;Right Leg     Right lower medial leg:    Wash your hands with soap and water.  Remove old dressing, discard into plastic bag and place in trash.    Cleanse the wound with unscented soap (such as plain white Dove soap) and warm water prior to applying a clean dressing. Do not use tissue or cotton balls.   Do not scrub the wound. Pat dry using gauze.      Shower - yes       Carmen ALVARADO will order a new prescription ointment called SANTYL. The specialty pharmacy will call you and follow up with you regarding the quoted price. Please be aware the price may be vary.      Wound care for the time being:  Apply Medihoney to the right lower leg wound.  Cover with ABD pad  Secure with rolled gauze and tape  Change dressing daily.         Please call the [facility] Monday through Friday between the hours of 8:00 AM and 4:30 PM at [phone] if you have any questions, if you experience any major changes in your wound(s) or for any signs or symptoms of infection such as fever; changes in the redness, swelling, drainage, or odor of your wound. After hours, weekends or holidays please contact your primary care physician or go to the hospital emergency room.          Standing Status:   Future     Standing Expiration Date:   9/12/2024    Debridement Venous Ulcer Distal;Right Leg     This order was created via procedure documentation       Cally Hernandez, PA-C      Portions of the record may have been created with voice recognition software. Occasional wrong word or \"sound alike\" substitutions may have occurred due to the inherent limitations of voice recognition software. Read the chart carefully and recognize, using " context, where substitutions have occurred.

## 2024-09-23 ENCOUNTER — OFFICE VISIT (OUTPATIENT)
Facility: HOSPITAL | Age: 64
End: 2024-09-23
Payer: COMMERCIAL

## 2024-09-23 VITALS
DIASTOLIC BLOOD PRESSURE: 61 MMHG | TEMPERATURE: 97.3 F | RESPIRATION RATE: 20 BRPM | HEART RATE: 79 BPM | SYSTOLIC BLOOD PRESSURE: 131 MMHG

## 2024-09-23 DIAGNOSIS — T14.8XXA WOUND INFECTION: ICD-10-CM

## 2024-09-23 DIAGNOSIS — L08.9 WOUND INFECTION: ICD-10-CM

## 2024-09-23 DIAGNOSIS — I83.018 VENOUS STASIS ULCER OF OTHER PART OF RIGHT LOWER LEG WITH FAT LAYER EXPOSED, UNSPECIFIED WHETHER VARICOSE VEINS PRESENT (HCC): Primary | ICD-10-CM

## 2024-09-23 DIAGNOSIS — L97.812 VENOUS STASIS ULCER OF OTHER PART OF RIGHT LOWER LEG WITH FAT LAYER EXPOSED, UNSPECIFIED WHETHER VARICOSE VEINS PRESENT (HCC): Primary | ICD-10-CM

## 2024-09-23 PROCEDURE — 99214 OFFICE O/P EST MOD 30 MIN: CPT | Performed by: FAMILY MEDICINE

## 2024-09-23 PROCEDURE — 99213 OFFICE O/P EST LOW 20 MIN: CPT | Performed by: FAMILY MEDICINE

## 2024-09-23 RX ORDER — LEVOFLOXACIN 500 MG/1
500 TABLET, FILM COATED ORAL EVERY 24 HOURS
Qty: 7 TABLET | Refills: 0 | Status: SHIPPED | OUTPATIENT
Start: 2024-09-23 | End: 2024-09-30

## 2024-09-23 RX ORDER — LIDOCAINE 40 MG/G
CREAM TOPICAL ONCE
Status: COMPLETED | OUTPATIENT
Start: 2024-09-23 | End: 2024-09-23

## 2024-09-23 RX ADMIN — LIDOCAINE: 40 CREAM TOPICAL at 11:49

## 2024-09-23 NOTE — PROGRESS NOTES
Patient ID: Martha Payan is a 64 y.o. female Date of Birth 1960       Chief Complaint   Patient presents with    Follow Up Wound Care Visit     Right leg ulcer        Allergies:  Sulfamethoxazole-trimethoprim    Diagnosis:      Diagnosis ICD-10-CM Associated Orders   1. Venous stasis ulcer of other part of right lower leg with fat layer exposed, unspecified whether varicose veins present ([facility])  I83.018 Wound cleansing and dressings Venous Ulcer Distal;Right Leg    L97.812 lidocaine (LMX) 4 % cream     Wound cleansing and dressings Venous Ulcer Distal;Right Leg     Wound Procedure Treatment Venous Ulcer Distal;Right Leg     levofloxacin (LEVAQUIN) 500 mg tablet      2. Wound infection  T14.8XXA     L08.9               Assessment & Plan:  Venous stasis ulcer with local wound infection.  Petechial rash surrounding.  Because of pain and experienced of previous debridement, patient did not want any further debridement.  It was decided to treat empirically with levofloxacin because of yellow-green drainage.  She has not had any fever or chills.  Continue with Santyl daily.  Discussed the routine which she was doing somewhat improperly.  Recommended Vashe to be used after washing the area with Dove soap.  This is to be used just before applying Santyl.  Tubigrip.  At this point I do not believe she will allow any further compression until inflammation is improved.  Follow-up in 1 week.         Subjective:   07/12/24: 65 y/o F with PMHx of DM, breast CA, hypothyroidism, peripheral edema presents for evaluation of venous ulcerations of bilateral lower legs. She was previously seen by the wound care center and was healed in February. She reports that she was healed for about one month and then started noticing her legs ulcerations again; she reports she worse her compression for approximately one month after being healed in February and then d/c use of compression. She reports being very frustrated that she had just  underwent a long process with wound care so recently and was upset the ulcers returned therefore she did not want to come back to wound care immediately and tried to treat the ulcers on her own with a variety of creams. She was visited by her daughter in July whom saw pt's legs and how red they were and suggested she go to urgent care for urgent evaluation. Pt presented to urgent care on 07/07/24 and was diagnosed with bilateral leg cellulitis and was started on Clindamycin. Pt notes that after two days of taking Clindamycin she noticed a heavy feeling in her chest and therefore her PCP d/c the Clindamycin and started her on Levaquin on 07/09/24 which she has been taking since. It was also recommended that she apply Bacitracin ointment and cleanse her wounds daily. She would like to know why her wounds have returned and how we can cure her. Is questioning why we previously had her in multilayer wraps that were changed weekly when she was recently told she should be changing her dressings daily.      07/18/24: Pt presents for f/u of bilateral lower extremity venous ulcerations. She reports she received her supplies and has been using silver alginate changed every other day with Spandagrips for compression. She has been taking her Keflex as prescribed and reports the redness in her legs has improved from prior visit. Reports her children are concern that her ulcerations returned/were not cured with prior wound care therefore they have made an appointment at [facility] for her for a second opinion regarding management. She denies fevers, malaise. Reports her RLE is tender today therefore she does not wish to have a debridement performed.     09/05/24: Pt presents for f/u of bilateral lower extremity ulcerations. She sought a second opinion at [facility] and triad paste with compression stockings was being utilized however she wished to return here for care. Reports tenderness in the RLE wound.  "Reports her LLE is now healed without any ulcerations. Unfortunately had a stroke since her prior visit; no obvious residual deficits in terms of weakness but reports her brain is occasionally feeling \"foggy\"; is working to establish a f/u visit with neurology. Reports her license has been taken away.     9/23/2024: Follow-up of venous stasis ulcer of the right lower extremity.  Last visit was on 9/5/2024.  She states that she was here last week but after waiting 1 hour in the waiting room she left.  She was very upset after her last visit because of the debridement.  She states that after the debridement, she had pain which persists and makes it difficult for her to sleep.  She developed a rash around the wound and noticed some odor.  She denies any fever or chills.        The following portions of the patient's history were reviewed and updated as appropriate:   There is no problem list on file for this patient.    Past Medical History:   Diagnosis Date    Breast cancer (HCC)     Hypothyroidism     Hypothyroidism     Kidney stones      Past Surgical History:   Procedure Laterality Date    TUBAL LIGATION       No family history on file.   Social History     Socioeconomic History    Marital status:      Spouse name: Not on file    Number of children: Not on file    Years of education: Not on file    Highest education level: Not on file   Occupational History    Not on file   Tobacco Use    Smoking status: Never    Smokeless tobacco: Never   Vaping Use    Vaping status: Never Used   Substance and Sexual Activity    Alcohol use: Not Currently    Drug use: Never    Sexual activity: Not on file   Other Topics Concern    Not on file   Social History Narrative    Not on file     Social Determinants of Health     Financial Resource Strain: High Risk (8/27/2024)    Received from [facility]    Overall Financial Resource Strain (CARDIA)     Difficulty of Paying Living Expenses: Very hard   Food " Insecurity: Food Insecurity Present (8/27/2024)    Received from [facility]    Hunger Vital Sign     Worried About Running Out of Food in the Last Year: Sometimes true     Ran Out of Food in the Last Year: Sometimes true   Transportation Needs: No Transportation Needs (8/27/2024)    Received from [facility]    PRAPARE - Transportation     Lack of Transportation (Medical): No     Lack of Transportation (Non-Medical): No   Physical Activity: Not on file   Stress: Not on file   Social Connections: Not on file   Intimate Partner Violence: Patient Unable To Answer (8/27/2024)    Received from [facility]    Humiliation, Afraid, Rape, and Kick questionnaire     Fear of Current or Ex-Partner: Patient unable to answer     Emotionally Abused: Patient unable to answer     Physically Abused: Patient unable to answer     Sexually Abused: Patient unable to answer   Housing Stability: Low Risk  (8/27/2024)    Received from [facility]    Housing Stability Vital Sign     Unable to Pay for Housing in the Last Year: No     Number of Times Moved in the Last Year: 0     Homeless in the Last Year: No        Current Outpatient Medications:     levofloxacin (LEVAQUIN) 500 mg tablet, Take 1 tablet (500 mg total) by mouth every 24 hours for 7 days, Disp: 7 tablet, Rfl: 0    acetaminophen (TYLENOL) 650 mg CR tablet, Take 650 mg by mouth every 8 (eight) hours as needed for mild pain, Disp: , Rfl:     anastrozole (ARIMIDEX) 1 mg tablet, Take 1 mg by mouth daily, Disp: , Rfl:     aspirin 81 mg chewable tablet, Chew 81 mg daily, Disp: , Rfl:     atorvastatin (LIPITOR) 40 mg tablet, Take 40 mg by mouth daily, Disp: , Rfl:     benzonatate (TESSALON PERLES) 100 mg capsule, Take 1 capsule (100 mg total) by mouth every 8 (eight) hours (Patient not taking: Reported on 7/11/2024), Disp: 21 capsule, Rfl: 0    clotrimazole (LOTRIMIN) 1 % cream, Apply to affected area 2 times daily for  "4 weeks (Patient not taking: Reported on 7/11/2024), Disp: 15 g, Rfl: 0    collagenase (SANTYL) ointment, Apply topically daily To wound of the RLE, Disp: 90 g, Rfl: 1    HYDROcodone-acetaminophen (Norco) 5-325 mg per tablet, Take 1 tablet by mouth every 6 (six) hours as needed for pain Max Daily Amount: 4 tablets (Patient not taking: Reported on 7/11/2024), Disp: 15 tablet, Rfl: 0    ibuprofen (MOTRIN) 800 mg tablet, Take 1 tablet (800 mg total) by mouth every 8 (eight) hours as needed for mild pain (Patient not taking: Reported on 1/12/2022), Disp: 45 tablet, Rfl: 0    levothyroxine 88 mcg tablet, Take 50 mcg by mouth daily, Disp: , Rfl:     methylPREDNISolone 4 MG tablet therapy pack, Use as directed on package (Patient not taking: Reported on 1/12/2022), Disp: 21 tablet, Rfl: 0    Multiple Vitamins-Minerals (CENTRUM SILVER 50+WOMEN PO), Take 1 tablet by mouth daily, Disp: , Rfl:     sertraline (ZOLOFT) 25 mg tablet, Take 25 mg by mouth daily, Disp: , Rfl:     torsemide (DEMADEX) 10 mg tablet, Take 10 mg by mouth daily, Disp: , Rfl:     Verzenio 100 MG TABS, Take 1 tablet by mouth 2 (two) times a day, Disp: , Rfl:   No current facility-administered medications for this visit.    Review of Systems   Constitutional:  Negative for chills and fever.   HENT:  Negative for congestion.    Respiratory:  Negative for shortness of breath.    Cardiovascular:  Positive for leg swelling.   Musculoskeletal:  Positive for gait problem.   Skin:  Positive for rash (Around ulcer on the right leg) and wound (RLE).   Neurological:         + \"brain fog\"   Psychiatric/Behavioral:  Positive for dysphoric mood. The patient is nervous/anxious.        Objective:  /61   Pulse 79   Temp (!) 97.3 °F (36.3 °C)   Resp 20   Pain Score:   8     Physical Exam  Vitals reviewed.   Constitutional:       Appearance: She is obese.   Cardiovascular:      Rate and Rhythm: Normal rate.      Pulses:           Dorsalis pedis pulses are 2+ on the " right side and 2+ on the left side.        Posterior tibial pulses are 2+ on the right side and 2+ on the left side.   Pulmonary:      Effort: Pulmonary effort is normal. No respiratory distress.   Musculoskeletal:      Right lower leg: Edema present.      Left lower leg: Edema present.   Skin:     Findings: Rash (Petechial) and wound present. No erythema.             Comments: Ulcer on the right lower anterior leg.  Larger than at last visit.  Slough and fibrin with yellow-green drainage.  Slight malodor.  Large area of surrounding petechial rash.  2 small areas just above with adherent slough.  Very tender to any palpation.  ·      Neurological:      Mental Status: She is alert.      Gait: Gait abnormal.   Psychiatric:         Attention and Perception: Attention normal.         Wound 09/05/24 Venous Ulcer Leg Distal;Right (Active)   Wound Image Images linked 09/23/24 1107   Wound Description Pink;Epithelialization;Other (Comment);Slough;Gray (dark purple) 09/23/24 1117   Joy-wound Assessment Edema;Erythema;Fragile;Purple 09/23/24 1117   Wound Length (cm) 8.5 cm 09/23/24 1117   Wound Width (cm) 5.2 cm 09/23/24 1117   Wound Depth (cm) 0.1 cm 09/23/24 1117   Wound Surface Area (cm^2) 44.2 cm^2 09/23/24 1117   Wound Volume (cm^3) 4.42 cm^3 09/23/24 1117   Calculated Wound Volume (cm^3) 4.42 cm^3 09/23/24 1117   Change in Wound Size % -342 09/23/24 1117   Drainage Amount Moderate 09/23/24 1117   Drainage Description Serosanguineous;Green 09/23/24 1117   Non-staged Wound Description Full thickness 09/23/24 1117   Treatments Irrigation with NSS 09/23/24 1117   Dressing Status Intact 09/23/24 1117        Patient declined debridement.          Lab Results   Component Value Date    HGBA1C 5.6 08/28/2024       Wound Instructions:  Orders Placed This Encounter   Procedures    Wound cleansing and dressings Venous Ulcer Distal;Right Leg     Right lower medial leg:  Wash your hands with soap and water.  Remove old dressing,  discard into plastic bag and place in trash.      Shower - yes -- wash your body 1st, then wash your wound with unscented dove, then rinse well with water .  AFTER  showering  please cleanse wound with Vashe  then apply new dressing      Cleanse the wound with Vashe antibacterial wound cleanser (this is available on [employer] )   prior to applying a clean dressing. Do not use tissue or cotton balls.   Do not scrub the wound. Pat dry using gauze.         Apply Santyl  -( jerardo thickness, please apply with a gloved hand ) to the right lower leg wound.  Cover with 4 x 4 gauze. Then cover with ABD pad.   Secure with rolled gauze and tape  Change dressing daily.            antibiotic and take as directed      Please call the Wound Healing Center Monday through Friday between the hours of 8:00 AM and 4:30 PM at [phone] if you have any questions, if you experience any major changes in your wound(s) or for any signs or symptoms of infection such as fever; changes in the redness, swelling, drainage, or odor of your wound. After hours, weekends or holidays please contact your primary care physician or go to the hospital emergency room.    Follow up in 1 week     Standing Status:   Future     Standing Expiration Date:   9/30/2024    Wound cleansing and dressings Venous Ulcer Distal;Right Leg     Elastic Tubular Stocking- spandagrip F RLE    Tubular elastic bandage: Apply from base of toes to behind the knee. Apply in AM, may remove for sleep.    Avoid prolonged standing in one place.    Elevate leg(s) above the level of the heart when sitting or as much as possible.     Standing Status:   Future     Standing Expiration Date:   9/30/2024    Wound Procedure Treatment Venous Ulcer Distal;Right Leg     This order was created via procedure documentation             Uday Santoyo MD, CHT, CWS    Portions of the record may have been created with voice recognition software. Occasional wrong  "word or \"sound alike\" substitutions may have occurred due to the inherent limitations of voice recognition software. Read the chart carefully and recognize, using context, where substitutions have occurred.    "

## 2024-09-23 NOTE — PROGRESS NOTES
Wound Procedure Treatment Venous Ulcer Distal;Right Leg    Performed by: Jen Chin RN  Authorized by: Uday Santoyo MD    Associated wounds:   Wound 09/05/24 Venous Ulcer Leg Distal;Right  Wound cleansed with:  NSS  Applied Topical: Medihoney gel    Applied secondary dressing:  Gauze and ABD  Dressing secured with:  Pedro, Tape, Size F and Elastic tubular stocking

## 2024-09-23 NOTE — PATIENT INSTRUCTIONS
Orders Placed This Encounter   Procedures    Wound cleansing and dressings Venous Ulcer Distal;Right Leg     Right lower medial leg:  Wash your hands with soap and water.  Remove old dressing, discard into plastic bag and place in trash.      Shower - yes -- wash your body 1st, then wash your wound with unscented dove, then rinse well with water .  AFTER  showering  please cleanse wound with Vashe  then apply new dressing      Cleanse the wound with Vashe antibacterial wound cleanser (this is available on [email or web address] )   prior to applying a clean dressing. Do not use tissue or cotton balls.   Do not scrub the wound. Pat dry using gauze.         Apply Santyl  -( jerardo thickness, please apply with a gloved hand ) to the right lower leg wound.  Cover with 4 x 4 gauze. Then cover with ABD pad.   Secure with rolled gauze and tape  Change dressing daily.            antibiotic and take as directed      Please call the Wound Healing Center Monday through Friday between the hours of 8:00 AM and 4:30 PM at [phone] if you have any questions, if you experience any major changes in your wound(s) or for any signs or symptoms of infection such as fever; changes in the redness, swelling, drainage, or odor of your wound. After hours, weekends or holidays please contact your primary care physician or go to the hospital emergency room.    Follow up in 1 week     Standing Status:   Future     Standing Expiration Date:   9/30/2024    Wound cleansing and dressings Venous Ulcer Distal;Right Leg     Elastic Tubular Stocking- spandagrip F RLE    Tubular elastic bandage: Apply from base of toes to behind the knee. Apply in AM, may remove for sleep.    Avoid prolonged standing in one place.    Elevate leg(s) above the level of the heart when sitting or as much as possible.     Standing Status:   Future     Standing Expiration Date:   9/30/2024

## 2024-09-30 ENCOUNTER — OFFICE VISIT (OUTPATIENT)
Facility: HOSPITAL | Age: 64
End: 2024-09-30
Payer: COMMERCIAL

## 2024-09-30 VITALS
SYSTOLIC BLOOD PRESSURE: 144 MMHG | TEMPERATURE: 97.6 F | HEART RATE: 87 BPM | RESPIRATION RATE: 18 BRPM | DIASTOLIC BLOOD PRESSURE: 73 MMHG

## 2024-09-30 DIAGNOSIS — E11.69 TYPE 2 DIABETES MELLITUS WITH OTHER SPECIFIED COMPLICATION, WITHOUT LONG-TERM CURRENT USE OF INSULIN (HCC): ICD-10-CM

## 2024-09-30 DIAGNOSIS — L97.812 VENOUS STASIS ULCER OF OTHER PART OF RIGHT LOWER LEG WITH FAT LAYER EXPOSED, UNSPECIFIED WHETHER VARICOSE VEINS PRESENT (HCC): Primary | ICD-10-CM

## 2024-09-30 DIAGNOSIS — I83.018 VENOUS STASIS ULCER OF OTHER PART OF RIGHT LOWER LEG WITH FAT LAYER EXPOSED, UNSPECIFIED WHETHER VARICOSE VEINS PRESENT (HCC): Primary | ICD-10-CM

## 2024-09-30 PROCEDURE — 99213 OFFICE O/P EST LOW 20 MIN: CPT | Performed by: STUDENT IN AN ORGANIZED HEALTH CARE EDUCATION/TRAINING PROGRAM

## 2024-09-30 NOTE — PATIENT INSTRUCTIONS
Orders Placed This Encounter   Procedures    Wound cleansing and dressings Venous Ulcer Distal;Right Leg     Right lower medial leg:   Wash your hands with soap and water.  Remove old dressing, discard into plastic bag and place in trash.      Shower - yes -- wash your body 1st, then wash your wound with unscented dove, then rinse well with water .  AFTER  showering  please cleanse wound with Vashe  then apply new dressing      Cleanse the wound with Vashe antibacterial wound cleanser (this is available on [employer] )   prior to applying a clean dressing. Do not use tissue or cotton balls.   Do not scrub the wound. Pat dry using gauze.          Apply Santyl  -( jerardo thickness, please apply with a gloved hand ) to the right lower leg wound.  Cover with 4 x 4 gauze. Then cover with ABD pad.   Secure with rolled gauze and tape   Change dressing daily.         Please call the [facility] Healing Center Monday through Friday between the hours of 8:00 AM and 4:30 PM at [phone] if you have any questions, if you experience any major changes in your wound(s) or for any signs or symptoms of infection such as fever; changes in the redness, swelling, drainage, or odor of your wound. After hours, weekends or holidays please contact your primary care physician or go to the hospital emergency room.       Follow up in 1 week     Standing Status:   Future     Standing Expiration Date:   10/7/2024

## 2024-10-09 ENCOUNTER — OFFICE VISIT (OUTPATIENT)
Facility: HOSPITAL | Age: 64
End: 2024-10-09
Payer: COMMERCIAL

## 2024-10-09 VITALS
HEART RATE: 85 BPM | DIASTOLIC BLOOD PRESSURE: 69 MMHG | SYSTOLIC BLOOD PRESSURE: 135 MMHG | RESPIRATION RATE: 18 BRPM | TEMPERATURE: 97 F

## 2024-10-09 DIAGNOSIS — L97.812 VENOUS STASIS ULCER OF OTHER PART OF RIGHT LOWER LEG WITH FAT LAYER EXPOSED, UNSPECIFIED WHETHER VARICOSE VEINS PRESENT (HCC): Primary | ICD-10-CM

## 2024-10-09 DIAGNOSIS — I83.018 VENOUS STASIS ULCER OF OTHER PART OF RIGHT LOWER LEG WITH FAT LAYER EXPOSED, UNSPECIFIED WHETHER VARICOSE VEINS PRESENT (HCC): Primary | ICD-10-CM

## 2024-10-09 DIAGNOSIS — E11.69 TYPE 2 DIABETES MELLITUS WITH OTHER SPECIFIED COMPLICATION, WITHOUT LONG-TERM CURRENT USE OF INSULIN (HCC): ICD-10-CM

## 2024-10-09 PROCEDURE — 29580 STRAPPING UNNA BOOT: CPT

## 2024-10-09 NOTE — PROGRESS NOTES
Patient ID: Martha Payan is a 64 y.o. female Date of Birth 1960       Chief Complaint   Patient presents with    Follow Up Wound Care Visit     RLE       Allergies:  Sulfamethoxazole-trimethoprim    Diagnosis:   Diagnosis ICD-10-CM Associated Orders   1. Venous stasis ulcer of other part of right lower leg with fat layer exposed, unspecified whether varicose veins present ([facility])  I83.018 Wound cleansing and dressings Venous Ulcer Distal;Right Leg    L97.812 Cast Application     Wound Procedure Treatment Venous Ulcer Distal;Right Leg      2. Type 2 diabetes mellitus with other specified complication, without long-term current use of insulin ([facility])  E11.69            Assessment  & Plan:    Follow-up venous ulceration of the right medial lower leg.  No significant improvement in size.  Despite use of Santyl there continues to be adherent slough overlying a fibrinous ulcer base.  Drainage is small.  Leg is edematous.  No diffuse erythema or lymphangitic streaking to suggest acute soft tissue infection.  Patient would not allow for debridement today.  There were reports of significant pain with even light touch of curette to the wound.  No tissue/slough was able to be removed.  Patient is not currently in adequate compression for healing of venous ulceration.  Would like to utilize PolyMem foam and Unna boot for compression due to lack of improvement with santyl.  Do not get the multilayer compression wrap wet.  Notify wound care center if there is any significant discomfort, color change, temperature change or sensation change of the toes with use of compression.  Report to the emergency room if there is any shortness of breath with use of compression.  Elevate legs when resting. Avoid prolonged standing in one place.   Blood glucose is very well controlled with A1c of 5.6 as of one month prior.  F/u in one week. Instructed to call if any questions or concerns arise in meantime.              Subjective:   07/12/24:  63 y/o F with PMHx of DM, breast CA, hypothyroidism, peripheral edema presents for evaluation of venous ulcerations of bilateral lower legs. She was previously seen by the wound care center and was healed in February. She reports that she was healed for about one month and then started noticing her legs ulcerations again; she reports she worse her compression for approximately one month after being healed in February and then d/c use of compression. She reports being very frustrated that she had just underwent a long process with wound care so recently and was upset the ulcers returned therefore she did not want to come back to wound care immediately and tried to treat the ulcers on her own with a variety of creams. She was visited by her daughter in July whom saw pt's legs and how red they were and suggested she go to urgent care for urgent evaluation. Pt presented to urgent care on 07/07/24 and was diagnosed with bilateral leg cellulitis and was started on Clindamycin. Pt notes that after two days of taking Clindamycin she noticed a heavy feeling in her chest and therefore her PCP d/c the Clindamycin and started her on Levaquin on 07/09/24 which she has been taking since. It was also recommended that she apply Bacitracin ointment and cleanse her wounds daily. She would like to know why her wounds have returned and how we can cure her. Is questioning why we previously had her in multilayer wraps that were changed weekly when she was recently told she should be changing her dressings daily.      07/18/24: Pt presents for f/u of bilateral lower extremity venous ulcerations. She reports she received her supplies and has been using silver alginate changed every other day with Spandagrips for compression. She has been taking her Keflex as prescribed and reports the redness in her legs has improved from prior visit. Reports her children are concern that her ulcerations returned/were not cured with prior wound care  "therefore they have made an appointment at [facility] for her for a second opinion regarding management. She denies fevers, malaise. Reports her RLE is tender today therefore she does not wish to have a debridement performed.     09/05/24: Pt presents for f/u of bilateral lower extremity ulcerations. She sought a second opinion at [facility] and triad paste with compression stockings was being utilized however she wished to return here for care. Reports tenderness in the RLE wound. Reports her LLE is now healed without any ulcerations. Unfortunately had a stroke since her prior visit; no obvious residual deficits in terms of weakness but reports her brain is occasionally feeling \"foggy\"; is working to establish a f/u visit with neurology. Reports her license has been taken away.     9/23/2024: HPI per Dr. Santoyo, \"Follow-up of venous stasis ulcer of the right lower extremity.  Last visit was on 9/5/2024.  She states that she was here last week but after waiting 1 hour in the waiting room she left.  She was very upset after her last visit because of the debridement.  She states that after the debridement, she had pain which persists and makes it difficult for her to sleep.  She developed a rash around the wound and noticed some odor.  She denies any fever or chills.\"    09/30/24: Pt presents for f/u VSU of the RLE. She reports she has continued with use of Santyl and washing the wound with Vashe solution as instructed at her previous visit. She reports taking her antibiotic as prescribed and denies experiencing any SE from the Levaquin. Today is her last day in her antibiotic course. Has been using Spandagrip for compression. Does not wish to have any debridement performed today.     10/09/24: Patient presents for follow-up of right lower extremity venous ulceration.  She reports that she has been cleansing with Vashe and applying Santyl daily as instructed.          The following portions of " the patient's history were reviewed and updated as appropriate:   There is no problem list on file for this patient.    Past Medical History:   Diagnosis Date    Breast cancer (HCC)     Hypothyroidism     Hypothyroidism     Kidney stones      Past Surgical History:   Procedure Laterality Date    TUBAL LIGATION       No family history on file.  Social History     Socioeconomic History    Marital status:      Spouse name: None    Number of children: None    Years of education: None    Highest education level: None   Occupational History    None   Tobacco Use    Smoking status: Never    Smokeless tobacco: Never   Vaping Use    Vaping status: Never Used   Substance and Sexual Activity    Alcohol use: Not Currently    Drug use: Never    Sexual activity: None   Other Topics Concern    None   Social History Narrative    None     Social Determinants of Health     Financial Resource Strain: High Risk (8/27/2024)    Received from [facility]    Overall Financial Resource Strain (CARDIA)     Difficulty of Paying Living Expenses: Very hard   Food Insecurity: Food Insecurity Present (8/27/2024)    Received from [facility]    Hunger Vital Sign     Worried About Running Out of Food in the Last Year: Sometimes true     Ran Out of Food in the Last Year: Sometimes true   Transportation Needs: No Transportation Needs (8/27/2024)    Received from [facility]    PRAPARE - Transportation     Lack of Transportation (Medical): No     Lack of Transportation (Non-Medical): No   Physical Activity: Not on file   Stress: Not on file   Social Connections: Not on file   Intimate Partner Violence: Patient Unable To Answer (8/27/2024)    Received from [facility]    Humiliation, Afraid, Rape, and Kick questionnaire     Fear of Current or Ex-Partner: Patient unable to answer     Emotionally Abused: Patient unable to answer     Physically Abused: Patient unable to answer      Sexually Abused: Patient unable to answer   Housing Stability: Low Risk  (8/27/2024)    Received from [facility]    Housing Stability Vital Sign     Unable to Pay for Housing in the Last Year: No     Number of Times Moved in the Last Year: 0     Homeless in the Last Year: No       Current Outpatient Medications:     acetaminophen (TYLENOL) 650 mg CR tablet, Take 650 mg by mouth every 8 (eight) hours as needed for mild pain, Disp: , Rfl:     anastrozole (ARIMIDEX) 1 mg tablet, Take 1 mg by mouth daily, Disp: , Rfl:     aspirin 81 mg chewable tablet, Chew 81 mg daily, Disp: , Rfl:     atorvastatin (LIPITOR) 40 mg tablet, Take 40 mg by mouth daily, Disp: , Rfl:     benzonatate (TESSALON PERLES) 100 mg capsule, Take 1 capsule (100 mg total) by mouth every 8 (eight) hours (Patient not taking: Reported on 7/11/2024), Disp: 21 capsule, Rfl: 0    clotrimazole (LOTRIMIN) 1 % cream, Apply to affected area 2 times daily for 4 weeks (Patient not taking: Reported on 7/11/2024), Disp: 15 g, Rfl: 0    collagenase (SANTYL) ointment, Apply topically daily To wound of the RLE, Disp: 90 g, Rfl: 1    HYDROcodone-acetaminophen (Norco) 5-325 mg per tablet, Take 1 tablet by mouth every 6 (six) hours as needed for pain Max Daily Amount: 4 tablets (Patient not taking: Reported on 7/11/2024), Disp: 15 tablet, Rfl: 0    ibuprofen (MOTRIN) 800 mg tablet, Take 1 tablet (800 mg total) by mouth every 8 (eight) hours as needed for mild pain (Patient not taking: Reported on 1/12/2022), Disp: 45 tablet, Rfl: 0    levothyroxine 88 mcg tablet, Take 50 mcg by mouth daily, Disp: , Rfl:     methylPREDNISolone 4 MG tablet therapy pack, Use as directed on package (Patient not taking: Reported on 1/12/2022), Disp: 21 tablet, Rfl: 0    Multiple Vitamins-Minerals (CENTRUM SILVER 50+WOMEN PO), Take 1 tablet by mouth daily, Disp: , Rfl:     sertraline (ZOLOFT) 25 mg tablet, Take 25 mg by mouth daily, Disp: , Rfl:     torsemide (DEMADEX) 10 mg  tablet, Take 10 mg by mouth daily, Disp: , Rfl:     Verzenio 100 MG TABS, Take 1 tablet by mouth 2 (two) times a day, Disp: , Rfl:     Review of Systems   Constitutional:  Negative for chills and fever.   Respiratory:  Negative for shortness of breath.    Cardiovascular:  Positive for leg swelling.   Skin:  Positive for wound (RLE).         Objective:  /69   Pulse 85   Temp (!) 97 °F (36.1 °C)   Resp 18   Pain Score: 0-No pain     Physical Exam  Vitals reviewed.   Cardiovascular:      Rate and Rhythm: Normal rate.      Pulses:           Dorsalis pedis pulses are 2+ on the right side and 2+ on the left side.        Posterior tibial pulses are 2+ on the right side and 2+ on the left side.   Pulmonary:      Effort: Pulmonary effort is normal. No respiratory distress.   Musculoskeletal:      Right lower leg: Edema present.      Left lower leg: Edema present.   Skin:     Findings: Wound present. No erythema or rash.      Comments: VSU of the RLE with slight reduction in size. Quality of tissue within ulcer base appears improved compared to photograph from previous visit. There continues to be adherent slough. Drainage is serous and small-moderate, green discoloration previously noted has resolved. Petechial rash has resolved. No diffuse erythema to indicate soft tissue infection.      Neurological:      Mental Status: She is alert.           Wound 09/05/24 Venous Ulcer Leg Distal;Right (Active)   Wound Image   10/09/24 0839   Wound Description Slough;Yellow 10/09/24 0839   Joy-wound Assessment Edema;Erythema;Fragile;Purple 10/09/24 0839   Wound Length (cm) 4.5 cm 10/09/24 0839   Wound Width (cm) 3 cm 10/09/24 0839   Wound Depth (cm) 0.1 cm 10/09/24 0839   Wound Surface Area (cm^2) 13.5 cm^2 10/09/24 0839   Wound Volume (cm^3) 1.35 cm^3 10/09/24 0839   Calculated Wound Volume (cm^3) 1.35 cm^3 10/09/24 0839   Change in Wound Size % -35 10/09/24 0839   Drainage Amount Moderate 10/09/24 0839   Drainage Description  "Serosanguineous;Yellow 10/09/24 0839   Non-staged Wound Description Full thickness 10/09/24 0839   Treatments Cleansed;Irrigation with NSS 10/09/24 0839   Patient Tolerance Tolerated well 10/09/24 0839   Dressing Status Old drainage 10/09/24 0839                   Wound Instructions:  Orders Placed This Encounter   Procedures    Wound cleansing and dressings Venous Ulcer Distal;Right Leg     Right lower medial leg:       Shower - No  - Please spongebath please      Unna boot applied to your right leg.  Please do not get wet. This will be changed at the wound center 1x a week.       Please call the Wound Healing Center Monday through Friday between the hours of 8:00 AM and 4:30 PM at [phone] if you have any questions, if you experience any major changes in your wound(s) or for any signs or symptoms of infection such as fever; changes in the redness, swelling, drainage, or odor of your wound. After hours, weekends or holidays please contact your primary care physician or go to the hospital emergency room.       Follow up in 1 week     Standing Status:   Future     Standing Expiration Date:   10/16/2024    Cast Application     This order was created via procedure documentation    Wound Procedure Treatment Venous Ulcer Distal;Right Leg     This order was created via procedure documentation       Cally Hernandez, PA-C      Portions of the record may have been created with voice recognition software. Occasional wrong word or \"sound alike\" substitutions may have occurred due to the inherent limitations of voice recognition software. Read the chart carefully and recognize, using context, where substitutions have occurred.    "

## 2024-10-09 NOTE — PATIENT INSTRUCTIONS
Orders Placed This Encounter   Procedures    Wound cleansing and dressings Venous Ulcer Distal;Right Leg     Right lower medial leg:       Shower - No  - Please spongebath please      Unna boot applied to your right leg.  Please do not get wet. This will be changed at the wound center 1x a week.       Please call the Wound Healing Center Monday through Friday between the hours of 8:00 AM and 4:30 PM at [phone] if you have any questions, if you experience any major changes in your wound(s) or for any signs or symptoms of infection such as fever; changes in the redness, swelling, drainage, or odor of your wound. After hours, weekends or holidays please contact your primary care physician or go to the hospital emergency room.       Follow up in 1 week     Standing Status:   Future     Standing Expiration Date:   10/16/2024

## 2024-10-09 NOTE — PROGRESS NOTES
"Cast Application    Date/Time: 10/9/2024 9:00 AM    Performed by: Shanika Valera RN  Authorized by: Carmen Hernandez PA-C  Universal Protocol:  procedure performed by consultantConsent: Verbal consent obtained.  Risks and benefits: risks, benefits and alternatives were discussed  Consent given by: patient  Time out: Immediately prior to procedure a \"time out\" was called to verify the correct patient, procedure, equipment, support staff and site/side marked as required.  Timeout called at: 10/9/2024 9:26 AM.  Patient understanding: patient states understanding of the procedure being performed  Patient consent: the patient's understanding of the procedure matches consent given  Patient identity confirmed: verbally with patient    Pre-procedure details:     Sensation:  Normal  Procedure details:     Laterality:  Right    Location:  Leg    Leg:  R lower legCast type:  Unna boot        Supplies:  2 layer wrap  Post-procedure details:     Pain:  Improved    Sensation:  Normal    Patient tolerance of procedure:  Tolerated well, no immediate complications  Comments:      UNNA BOOT wrap procedure     Before application, DAHIANA and/or TBI determined to be adequate for healing and application of compression. Lower extremity washed prior to application of compression wrap. With the foot in dorsiflexed position, unna boot was applied as per physician orders without complications or complaint of pain.  The procedure was tolerated well. Toes warm & pink post application.  Patient provided education & reinforced to observe toes for any discoloration, swelling or tingling and instructed when to report to the Wound Center or to remove compression. Wound care as per providers orders, patient tolerated well.    Wound Procedure Treatment Venous Ulcer Distal;Right Leg    Performed by: Shanika Valera RN  Authorized by: Carmen Hernandez PA-C    Associated wounds:   Wound 09/05/24 Venous Ulcer Leg Distal;Right  Wound cleansed with:  Wound " aggrssively cleansed with NSS and gauze  Applied primary dressing:  Polymem foam  Applied secondary dressing:  ABD  Dressing secured with:  Compression wrap, Coban and Tubifast  Comments:  Sherria boot

## 2024-10-16 ENCOUNTER — OFFICE VISIT (OUTPATIENT)
Facility: HOSPITAL | Age: 64
End: 2024-10-16
Payer: COMMERCIAL

## 2024-10-16 VITALS
DIASTOLIC BLOOD PRESSURE: 68 MMHG | RESPIRATION RATE: 18 BRPM | SYSTOLIC BLOOD PRESSURE: 140 MMHG | HEART RATE: 83 BPM | TEMPERATURE: 96.9 F

## 2024-10-16 DIAGNOSIS — E11.69 TYPE 2 DIABETES MELLITUS WITH OTHER SPECIFIED COMPLICATION, WITHOUT LONG-TERM CURRENT USE OF INSULIN (HCC): ICD-10-CM

## 2024-10-16 DIAGNOSIS — I83.018 VENOUS STASIS ULCER OF OTHER PART OF RIGHT LOWER LEG WITH FAT LAYER EXPOSED, UNSPECIFIED WHETHER VARICOSE VEINS PRESENT (HCC): Primary | ICD-10-CM

## 2024-10-16 DIAGNOSIS — L97.812 VENOUS STASIS ULCER OF OTHER PART OF RIGHT LOWER LEG WITH FAT LAYER EXPOSED, UNSPECIFIED WHETHER VARICOSE VEINS PRESENT (HCC): Primary | ICD-10-CM

## 2024-10-16 DIAGNOSIS — R68.89 ABNORMAL ANKLE BRACHIAL INDEX (ABI): ICD-10-CM

## 2024-10-16 PROCEDURE — 29581 APPL MULTLAYER CMPRN SYS LEG: CPT

## 2024-10-16 PROCEDURE — 99213 OFFICE O/P EST LOW 20 MIN: CPT | Performed by: STUDENT IN AN ORGANIZED HEALTH CARE EDUCATION/TRAINING PROGRAM

## 2024-10-16 RX ORDER — LIDOCAINE 40 MG/G
CREAM TOPICAL ONCE
Status: COMPLETED | OUTPATIENT
Start: 2024-10-16 | End: 2024-10-16

## 2024-10-16 RX ADMIN — LIDOCAINE 1 APPLICATION: 40 CREAM TOPICAL at 09:04

## 2024-10-16 NOTE — PATIENT INSTRUCTIONS
Orders Placed This Encounter   Procedures    Wound cleansing and dressings Venous Ulcer Distal;Right Leg     Right lower medial leg:         Shower - No  - Please spongebath please        Coflex Lite applied to your right leg.  Please do not get wet. This will be changed at the wound center 1x a week.    An order was placed for Vascular testing     Standing Status:   Future     Standing Expiration Date:   10/23/2024

## 2024-10-16 NOTE — PROGRESS NOTES
Cast Application    Date/Time: 10/16/2024 9:00 AM    Performed by: Dara Pozo RN  Authorized by: Carmen Hernandez PA-C  Universal Protocol:  procedure performed by consultantConsent: Verbal consent obtained.  Risks and benefits: risks, benefits and alternatives were discussed  Consent given by: patient  Patient understanding: patient states understanding of the procedure being performed  Patient identity confirmed: verbally with patient    Pre-procedure details:     Sensation:  Normal  Procedure details:     Laterality:  Right    Location:  Leg    Leg:  R lower legCast type:  Multi-layer compression short leg        Supplies:  2 layer wrap  Post-procedure details:     Pain:  Unchanged    Sensation:  Normal    Patient tolerance of procedure:  Tolerated well, no immediate complications  Comments:      Multiplayer wrap procedure     Before application, DAHIANA and/or TBI determined to be adequate for healing and application of compression. Lower extremity washed prior to application of compression wrap. With the foot in dorsiflexed position, Coflex Lite was applied as per physician orders without complications or complaint of pain.  The procedure was tolerated well. Toes warm & pink post application.  Patient provided education & reinforced to observe toes for any discoloration, swelling or tingling and instructed when to report to the Wound Center or to remove compression

## 2024-10-16 NOTE — PROGRESS NOTES
Wound Procedure Treatment Venous Ulcer Distal;Right Leg    Performed by: Dara Pozo RN  Authorized by: Carmen Hernandez PA-C    Associated wounds:   Wound 09/05/24 Venous Ulcer Leg Distal;Right  Wound cleansed with:  Dakin's 0.125% and Soap and water  Applied to periwound:  Zinc oxide paste and Moisture lotion  Applied primary dressing:  Polymem foam and Silver  Applied secondary dressing:  ABD  Dressing secured with:  Pedro, Tape and Compression wrap    Coflex Lite

## 2024-10-16 NOTE — PROGRESS NOTES
Patient ID: Martha Payan is a 64 y.o. female Date of Birth 1960       Chief Complaint   Patient presents with    Follow Up Wound Care Visit     Right LE wound--Unna boot Intact       Allergies:  Sulfamethoxazole-trimethoprim    Diagnosis:   Diagnosis ICD-10-CM Associated Orders   1. Venous stasis ulcer of other part of right lower leg with fat layer exposed, unspecified whether varicose veins present ([facility])  I83.018 lidocaine (LMX) 4 % cream    L97.812 Wound cleansing and dressings Venous Ulcer Distal;Right Leg     VAS ARTERIAL DUPLEX- LOWER LIMB BILATERAL      2. Abnormal ankle brachial index ([facility])  R68.89 VAS ARTERIAL DUPLEX- LOWER LIMB BILATERAL      3. Type 2 diabetes mellitus with other specified complication, without long-term current use of insulin ([facility])  E11.69            Assessment  & Plan:    F/u VSU of the R medial lower leg. While there has not been a reduction in the size of the ulceration, the quality of tissue has improved, there is new granulation tissue formation present on examination, continues to have adherent fibrinous tissue as well. Drainage is moderate with green discoloration noted on dressing. Periwound with mild maceration. No periwound erythema or lymphangitic streaking present.   Debridement deferred d/t pt discomfort with previous debridement.   Dakin soak performed today.   Polymem ag foam. Zinc to periwound. Coflex lite for compression. [facility] mildly reduced at 0.88 when taken in office today. Will send for formal arterial testing.   Discouraged use of garbage bag and tape for showering as small leaks can occur and multilayer wrap may get saturated. Recommend cast cover be obtained if patient would like to shower with multilayer wrap on.   Discussed if she experiencing significant discomfort, pain, itch, with use of multilayer compression office should be contacted for further evaluation. If there is a concern over the weekend please seek care at urgent care for skin/wound check.    Elevate legs when resting. Avoid prolonged standing in one place.   F/u early next week. Instructed to call if any questions or concerns arise in meantime.            Subjective:   07/12/24: 65 y/o F with PMHx of DM, breast CA, hypothyroidism, peripheral edema presents for evaluation of venous ulcerations of bilateral lower legs. She was previously seen by the wound care center and was healed in February. She reports that she was healed for about one month and then started noticing her legs ulcerations again; she reports she worse her compression for approximately one month after being healed in February and then d/c use of compression. She reports being very frustrated that she had just underwent a long process with wound care so recently and was upset the ulcers returned therefore she did not want to come back to wound care immediately and tried to treat the ulcers on her own with a variety of creams. She was visited by her daughter in July whom saw pt's legs and how red they were and suggested she go to urgent care for urgent evaluation. Pt presented to urgent care on 07/07/24 and was diagnosed with bilateral leg cellulitis and was started on Clindamycin. Pt notes that after two days of taking Clindamycin she noticed a heavy feeling in her chest and therefore her PCP d/c the Clindamycin and started her on Levaquin on 07/09/24 which she has been taking since. It was also recommended that she apply Bacitracin ointment and cleanse her wounds daily. She would like to know why her wounds have returned and how we can cure her. Is questioning why we previously had her in multilayer wraps that were changed weekly when she was recently told she should be changing her dressings daily.      07/18/24: Pt presents for f/u of bilateral lower extremity venous ulcerations. She reports she received her supplies and has been using silver alginate changed every other day with Spandagrips for compression. She has been taking her  "Keflex as prescribed and reports the redness in her legs has improved from prior visit. Reports her children are concern that her ulcerations returned/were not cured with prior wound care therefore they have made an appointment at [facility] for her for a second opinion regarding management. She denies fevers, malaise. Reports her RLE is tender today therefore she does not wish to have a debridement performed.     09/05/24: Pt presents for f/u of bilateral lower extremity ulcerations. She sought a second opinion at [facility] and triad paste with compression stockings was being utilized however she wished to return here for care. Reports tenderness in the RLE wound. Reports her LLE is now healed without any ulcerations. Unfortunately had a stroke since her prior visit; no obvious residual deficits in terms of weakness but reports her brain is occasionally feeling \"foggy\"; is working to establish a f/u visit with neurology. Reports her license has been taken away.     9/23/2024: HPI per Dr. Santoyo, \"Follow-up of venous stasis ulcer of the right lower extremity.  Last visit was on 9/5/2024.  She states that she was here last week but after waiting 1 hour in the waiting room she left.  She was very upset after her last visit because of the debridement.  She states that after the debridement, she had pain which persists and makes it difficult for her to sleep.  She developed a rash around the wound and noticed some odor.  She denies any fever or chills.\"    09/30/24: Pt presents for f/u VSU of the RLE. She reports she has continued with use of Santyl and washing the wound with Vashe solution as instructed at her previous visit. She reports taking her antibiotic as prescribed and denies experiencing any SE from the Levaquin. Today is her last day in her antibiotic course. Has been using Spandagrip for compression. Does not wish to have any debridement performed today.     10/09/24: Patient " presents for follow-up of right lower extremity venous ulceration.  She reports that she has been cleansing with Vashe and applying Santyl daily as instructed.    10/16/24: Pt presents for f/u RLE VSU. She has been wearing her Unna boot since previous visit. Reports itching of her entire leg with use of Unna boot. Is upset that the wound is not healing faster or measuring significantly smaller than her previous visit. States that she has been showering with multilayer compression wrap on, reports using a garbage bag and tape secured over the wrap in order to shower.           The following portions of the patient's history were reviewed and updated as appropriate:   There is no problem list on file for this patient.    Past Medical History:   Diagnosis Date    Breast cancer (HCC)     Hypothyroidism     Hypothyroidism     Kidney stones      Past Surgical History:   Procedure Laterality Date    TUBAL LIGATION       No family history on file.  Social History     Socioeconomic History    Marital status:      Spouse name: None    Number of children: None    Years of education: None    Highest education level: None   Occupational History    None   Tobacco Use    Smoking status: Never    Smokeless tobacco: Never   Vaping Use    Vaping status: Never Used   Substance and Sexual Activity    Alcohol use: Not Currently    Drug use: Never    Sexual activity: None   Other Topics Concern    None   Social History Narrative    None     Social Determinants of Health     Financial Resource Strain: High Risk (8/27/2024)    Received from [facility]    Overall Financial Resource Strain (CARDIA)     Difficulty of Paying Living Expenses: Very hard   Food Insecurity: Food Insecurity Present (8/27/2024)    Received from [facility]    Hunger Vital Sign     Worried About Running Out of Food in the Last Year: Sometimes true     Ran Out of Food in the Last Year: Sometimes true   Transportation Needs: No  Transportation Needs (8/27/2024)    Received from [facility]    PRAPARE - Transportation     Lack of Transportation (Medical): No     Lack of Transportation (Non-Medical): No   Physical Activity: Not on file   Stress: Not on file   Social Connections: Not on file   Intimate Partner Violence: Patient Unable To Answer (8/27/2024)    Received from [facility]    Humiliation, Afraid, Rape, and Kick questionnaire     Fear of Current or Ex-Partner: Patient unable to answer     Emotionally Abused: Patient unable to answer     Physically Abused: Patient unable to answer     Sexually Abused: Patient unable to answer   Housing Stability: Low Risk  (8/27/2024)    Received from [facility]    Housing Stability Vital Sign     Unable to Pay for Housing in the Last Year: No     Number of Times Moved in the Last Year: 0     Homeless in the Last Year: No       Current Outpatient Medications:     acetaminophen (TYLENOL) 650 mg CR tablet, Take 650 mg by mouth every 8 (eight) hours as needed for mild pain, Disp: , Rfl:     anastrozole (ARIMIDEX) 1 mg tablet, Take 1 mg by mouth daily, Disp: , Rfl:     aspirin 81 mg chewable tablet, Chew 81 mg daily, Disp: , Rfl:     atorvastatin (LIPITOR) 40 mg tablet, Take 40 mg by mouth daily, Disp: , Rfl:     benzonatate (TESSALON PERLES) 100 mg capsule, Take 1 capsule (100 mg total) by mouth every 8 (eight) hours (Patient not taking: Reported on 7/11/2024), Disp: 21 capsule, Rfl: 0    clotrimazole (LOTRIMIN) 1 % cream, Apply to affected area 2 times daily for 4 weeks (Patient not taking: Reported on 7/11/2024), Disp: 15 g, Rfl: 0    collagenase (SANTYL) ointment, Apply topically daily To wound of the RLE, Disp: 90 g, Rfl: 1    HYDROcodone-acetaminophen (Norco) 5-325 mg per tablet, Take 1 tablet by mouth every 6 (six) hours as needed for pain Max Daily Amount: 4 tablets (Patient not taking: Reported on 7/11/2024), Disp: 15 tablet, Rfl: 0     ibuprofen (MOTRIN) 800 mg tablet, Take 1 tablet (800 mg total) by mouth every 8 (eight) hours as needed for mild pain (Patient not taking: Reported on 1/12/2022), Disp: 45 tablet, Rfl: 0    levothyroxine 88 mcg tablet, Take 50 mcg by mouth daily, Disp: , Rfl:     methylPREDNISolone 4 MG tablet therapy pack, Use as directed on package (Patient not taking: Reported on 1/12/2022), Disp: 21 tablet, Rfl: 0    Multiple Vitamins-Minerals (CENTRUM SILVER 50+WOMEN PO), Take 1 tablet by mouth daily, Disp: , Rfl:     sertraline (ZOLOFT) 25 mg tablet, Take 25 mg by mouth daily, Disp: , Rfl:     torsemide (DEMADEX) 10 mg tablet, Take 10 mg by mouth daily, Disp: , Rfl:     Verzenio 100 MG TABS, Take 1 tablet by mouth 2 (two) times a day, Disp: , Rfl:   No current facility-administered medications for this visit.    Review of Systems   Constitutional:  Negative for chills and fever.   Respiratory:  Negative for shortness of breath.    Cardiovascular:  Positive for leg swelling.   Skin:  Positive for wound (RLE).         Objective:  /68   Pulse 83   Temp (!) 96.9 °F (36.1 °C)   Resp 18   Pain Score: 0-No pain     Physical Exam  Vitals reviewed.   Cardiovascular:      Rate and Rhythm: Normal rate.      Pulses:           Dorsalis pedis pulses are 2+ on the right side and 2+ on the left side.        Posterior tibial pulses are 2+ on the right side and 2+ on the left side.   Pulmonary:      Effort: Pulmonary effort is normal. No respiratory distress.   Musculoskeletal:      Right lower leg: Edema present.      Left lower leg: Edema present.   Skin:     Findings: Wound present. No erythema or rash.      Comments: VSU of the R medial lower leg. While there has not been a reduction in the size of the ulceration, the quality of tissue has improved, there is new granulation tissue formation present on examination, continues to have adherent fibrinous tissue as well. Drainage is moderate with green discoloration noted on dressing.  "Periwound with mild maceration. No periwound erythema or lymphangitic streaking present.      Neurological:      Mental Status: She is alert.           Wound 09/05/24 Venous Ulcer Leg Distal;Right (Active)   Wound Image   10/16/24 0855   Wound Description Granulation tissue;Yellow;Slough 10/16/24 0902   Joy-wound Assessment Edema;Maceration;Hyperpigmented 10/16/24 0902   Wound Length (cm) 5.4 cm 10/16/24 0902   Wound Width (cm) 3.2 cm 10/16/24 0902   Wound Depth (cm) 0.1 cm 10/16/24 0902   Wound Surface Area (cm^2) 17.28 cm^2 10/16/24 0902   Wound Volume (cm^3) 1.728 cm^3 10/16/24 0902   Calculated Wound Volume (cm^3) 1.73 cm^3 10/16/24 0902   Change in Wound Size % -73 10/16/24 0902   Drainage Amount Large 10/16/24 0902   Drainage Description Serosanguineous;Green;Yellow 10/16/24 0902   Non-staged Wound Description Full thickness 10/16/24 0902   Treatments Cleansed 10/16/24 0902   Patient Tolerance Tolerated well 10/09/24 0839   Dressing Status Intact 10/16/24 0902                 Wound Instructions:  Orders Placed This Encounter   Procedures    Wound cleansing and dressings Venous Ulcer Distal;Right Leg     Right lower medial leg:         Shower - No  - Please spongebath please        Coflex Lite applied to your right leg.  Please do not get wet. This will be changed at the wound center 1x a week.    An order was placed for Vascular testing     Standing Status:   Future     Standing Expiration Date:   10/23/2024    Wound Procedure Treatment Venous Ulcer Distal;Right Leg     This order was created via procedure documentation       Cally Hernandez, PA-C    Portions of the record may have been created with voice recognition software. Occasional wrong word or \"sound alike\" substitutions may have occurred due to the inherent limitations of voice recognition software. Read the chart carefully and recognize, using context, where substitutions have occurred.    "

## 2024-10-21 ENCOUNTER — HOSPITAL ENCOUNTER (OUTPATIENT)
Dept: NON INVASIVE DIAGNOSTICS | Facility: HOSPITAL | Age: 64
Discharge: HOME | End: 2024-10-21
Payer: COMMERCIAL

## 2024-10-21 ENCOUNTER — OFFICE VISIT (OUTPATIENT)
Facility: HOSPITAL | Age: 64
End: 2024-10-21
Payer: COMMERCIAL

## 2024-10-21 VITALS
TEMPERATURE: 97.1 F | HEART RATE: 83 BPM | DIASTOLIC BLOOD PRESSURE: 70 MMHG | SYSTOLIC BLOOD PRESSURE: 148 MMHG | RESPIRATION RATE: 20 BRPM

## 2024-10-21 DIAGNOSIS — I83.018 VENOUS STASIS ULCER OF OTHER PART OF RIGHT LOWER LEG WITH FAT LAYER EXPOSED, UNSPECIFIED WHETHER VARICOSE VEINS PRESENT (HCC): ICD-10-CM

## 2024-10-21 DIAGNOSIS — L97.812 VENOUS STASIS ULCER OF OTHER PART OF RIGHT LOWER LEG WITH FAT LAYER EXPOSED, UNSPECIFIED WHETHER VARICOSE VEINS PRESENT (HCC): ICD-10-CM

## 2024-10-21 DIAGNOSIS — R68.89 ABNORMAL ANKLE BRACHIAL INDEX (ABI): ICD-10-CM

## 2024-10-21 DIAGNOSIS — I83.018 VENOUS STASIS ULCER OF OTHER PART OF RIGHT LOWER LEG WITH FAT LAYER EXPOSED, UNSPECIFIED WHETHER VARICOSE VEINS PRESENT (HCC): Primary | ICD-10-CM

## 2024-10-21 DIAGNOSIS — I87.2: ICD-10-CM

## 2024-10-21 DIAGNOSIS — L97.812 VENOUS STASIS ULCER OF OTHER PART OF RIGHT LOWER LEG WITH FAT LAYER EXPOSED, UNSPECIFIED WHETHER VARICOSE VEINS PRESENT (HCC): Primary | ICD-10-CM

## 2024-10-21 PROCEDURE — 93923 UPR/LXTR ART STDY 3+ LVLS: CPT

## 2024-10-21 PROCEDURE — 99214 OFFICE O/P EST MOD 30 MIN: CPT | Performed by: STUDENT IN AN ORGANIZED HEALTH CARE EDUCATION/TRAINING PROGRAM

## 2024-10-21 PROCEDURE — 99213 OFFICE O/P EST LOW 20 MIN: CPT | Performed by: STUDENT IN AN ORGANIZED HEALTH CARE EDUCATION/TRAINING PROGRAM

## 2024-10-21 PROCEDURE — 93925 LOWER EXTREMITY STUDY: CPT

## 2024-10-21 RX ORDER — TRIAMCINOLONE ACETONIDE 1 MG/G
OINTMENT TOPICAL 2 TIMES DAILY
Qty: 30 G | Refills: 1 | Status: SHIPPED | OUTPATIENT
Start: 2024-10-21 | End: 2024-11-04

## 2024-10-21 NOTE — PATIENT INSTRUCTIONS
Orders Placed This Encounter   Procedures    Wound cleansing and dressings Venous Ulcer Distal;Right Leg     Right Lower leg ulcer    Wash your hands with soap and water.  Remove old dressing, discard into plastic bag and place in trash.    Cleanse the wound with Mild soap and water--such as Dove, rinse well and gently pat dry prior to applying a clean dressing. Do not use tissue or cotton balls.   Do not scrub the wound. Pat dry using gauze.    Shower yes ---do not soak in tub, pool, [place], etc    Apply topical steroid to skin surrounding wound    Apply Polymem AG to the wound.  Cover with ABD Pad  Secure with yared and tape  Change dressing 3 x weekly and as needed    Recommend wearing Spandagrip for compression and to assist with decreasing the swelling      The topical steroid ointment will be sent to your pharmacy--please  at your pharmacy today and apply only to the skin that surrounds the wound--do not use no longer than 2 weeks      Follow up in 1 week      Supplies will be ordered with [facility]--they will be delivered to your house     Standing Status:   Future     Standing Expiration Date:   10/28/2024

## 2024-10-21 NOTE — PROGRESS NOTES
Patient ID: Martha Payan is a 64 y.o. female Date of Birth 1960       Chief Complaint   Patient presents with    Follow Up Wound Care Visit     Right LE wound       Allergies:  Sulfamethoxazole-trimethoprim    Diagnosis:   Diagnosis ICD-10-CM Associated Orders   1. Venous stasis ulcer of other part of right lower leg with fat layer exposed, unspecified whether varicose veins present ([facility])  I83.018 Wound cleansing and dressings Venous Ulcer Distal;Right Leg    L97.812       2. Acute venous stasis dermatitis of right lower extremity  I87.2 triamcinolone (KENALOG) 0.1 % ointment           Assessment  & Plan:    F/u VSU of the R medial lower leg. There is a significant amount of slough present along with formation of granular buds. Drainage is moderate. Periwound without diffuse erythema. There is a petechial rash consistent with stasis dermatitis present on the lower leg.   Debridement was deferred by patient.   Apply polymem ag foam. Change three times weekly. Cleanse with Vashe cleanser on days of dressing changes.   Triamcinolone ointment prescribed bid x 2 weeks for stasis dermatitis. Avoid using on ulcer. Discussed not using for extended length of time as overuse can thin the skin.   Pt does not wish to proceed with use of Coflex lite any longer. Spandagrip for compression. Elevate legs when resting. Avoid prolonged standing in one place. Walking for 30-40 minutes daily is helpful to assist with venous return.   Instructed to monitor for any changes including redness or swelling surrounding the wound, increased drainage or pain as well as fevers or chills.    Results of arterial testing is pending.   F/u in one week. Instructed to call if any questions or concerns arise in meantime.       Subjective:   07/12/24: 63 y/o F with PMHx of DM, breast CA, hypothyroidism, peripheral edema presents for evaluation of venous ulcerations of bilateral lower legs. She was previously seen by the wound care center and was  healed in February. She reports that she was healed for about one month and then started noticing her legs ulcerations again; she reports she worse her compression for approximately one month after being healed in February and then d/c use of compression. She reports being very frustrated that she had just underwent a long process with wound care so recently and was upset the ulcers returned therefore she did not want to come back to wound care immediately and tried to treat the ulcers on her own with a variety of creams. She was visited by her daughter in July whom saw pt's legs and how red they were and suggested she go to urgent care for urgent evaluation. Pt presented to urgent care on 07/07/24 and was diagnosed with bilateral leg cellulitis and was started on Clindamycin. Pt notes that after two days of taking Clindamycin she noticed a heavy feeling in her chest and therefore her PCP d/c the Clindamycin and started her on Levaquin on 07/09/24 which she has been taking since. It was also recommended that she apply Bacitracin ointment and cleanse her wounds daily. She would like to know why her wounds have returned and how we can cure her. Is questioning why we previously had her in multilayer wraps that were changed weekly when she was recently told she should be changing her dressings daily.      07/18/24: Pt presents for f/u of bilateral lower extremity venous ulcerations. She reports she received her supplies and has been using silver alginate changed every other day with Spandagrips for compression. She has been taking her Keflex as prescribed and reports the redness in her legs has improved from prior visit. Reports her children are concern that her ulcerations returned/were not cured with prior wound care therefore they have made an appointment at [facility] for her for a second opinion regarding management. She denies fevers, malaise. Reports her RLE is tender today therefore she does not wish to have  "a debridement performed.     09/05/24: Pt presents for f/u of bilateral lower extremity ulcerations. She sought a second opinion at [facility] and triad paste with compression stockings was being utilized however she wished to return here for care. Reports tenderness in the RLE wound. Reports her LLE is now healed without any ulcerations. Unfortunately had a stroke since her prior visit; no obvious residual deficits in terms of weakness but reports her brain is occasionally feeling \"foggy\"; is working to establish a f/u visit with neurology. Reports her license has been taken away.     9/23/2024: HPI per Dr. Santoyo, \"Follow-up of venous stasis ulcer of the right lower extremity.  Last visit was on 9/5/2024.  She states that she was here last week but after waiting 1 hour in the waiting room she left.  She was very upset after her last visit because of the debridement.  She states that after the debridement, she had pain which persists and makes it difficult for her to sleep.  She developed a rash around the wound and noticed some odor.  She denies any fever or chills.\"    09/30/24: Pt presents for f/u VSU of the RLE. She reports she has continued with use of Santyl and washing the wound with Vashe solution as instructed at her previous visit. She reports taking her antibiotic as prescribed and denies experiencing any SE from the Levaquin. Today is her last day in her antibiotic course. Has been using Spandagrip for compression. Does not wish to have any debridement performed today.     10/09/24: Patient presents for follow-up of right lower extremity venous ulceration.  She reports that she has been cleansing with Vashe and applying Santyl daily as instructed.    10/16/24: Pt presents for f/u RLE VSU. She has been wearing her Unna boot since previous visit. Reports itching of her entire leg with use of Unna boot. Is upset that the wound is not healing faster or measuring significantly smaller " "than her previous visit. States that she has been showering with multilayer compression wrap on, reports using a garbage bag and tape secured over the wrap in order to shower.     10/21/24: Pt presents for f/u RLE VSU. Reports she had significant itching and discomfort with use of Coflex lite. She is upset that she has not seen more progress with her wound healing. Reports she would like to return to doing things \"her way\" in terms of wound care. Has gone for formal arterial testing this morning. Reports being on her legs a significant amount this week, she reports having to walk about 2 miles each way to the eye doctor in order to get paperwork saying her license could be reinstated.           The following portions of the patient's history were reviewed and updated as appropriate:   There is no problem list on file for this patient.    Past Medical History:   Diagnosis Date    Breast cancer (HCC)     Hypothyroidism     Hypothyroidism     Kidney stones      Past Surgical History:   Procedure Laterality Date    TUBAL LIGATION       No family history on file.  Social History     Socioeconomic History    Marital status:      Spouse name: Not on file    Number of children: Not on file    Years of education: Not on file    Highest education level: Not on file   Occupational History    Not on file   Tobacco Use    Smoking status: Never    Smokeless tobacco: Never   Vaping Use    Vaping status: Never Used   Substance and Sexual Activity    Alcohol use: Not Currently    Drug use: Never    Sexual activity: Not on file   Other Topics Concern    Not on file   Social History Narrative    Not on file     Social Determinants of Health     Financial Resource Strain: High Risk (8/27/2024)    Received from [facility]    Overall Financial Resource Strain (CARDIA)     Difficulty of Paying Living Expenses: Very hard   Food Insecurity: Food Insecurity Present (8/27/2024)    Received from [facility]    Hunger Vital Sign     Worried About Running Out of Food in the Last Year: Sometimes true     Ran Out of Food in the Last Year: Sometimes true   Transportation Needs: No Transportation Needs (8/27/2024)    Received from [facility]    PRAPARE - Transportation     Lack of Transportation (Medical): No     Lack of Transportation (Non-Medical): No   Physical Activity: Not on file   Stress: Not on file   Social Connections: Not on file   Intimate Partner Violence: Patient Unable To Answer (8/27/2024)    Received from [facility]    Humiliation, Afraid, Rape, and Kick questionnaire     Fear of Current or Ex-Partner: Patient unable to answer     Emotionally Abused: Patient unable to answer     Physically Abused: Patient unable to answer     Sexually Abused: Patient unable to answer   Housing Stability: Low Risk  (8/27/2024)    Received from [facility]    Housing Stability Vital Sign     Unable to Pay for Housing in the Last Year: No     Number of Times Moved in the Last Year: 0     Homeless in the Last Year: No       Current Outpatient Medications:     triamcinolone (KENALOG) 0.1 % ointment, Apply topically 2 (two) times a day for 14 days To rash of RLE. Avoid open wound, Disp: 30 g, Rfl: 1    acetaminophen (TYLENOL) 650 mg CR tablet, Take 650 mg by mouth every 8 (eight) hours as needed for mild pain, Disp: , Rfl:     anastrozole (ARIMIDEX) 1 mg tablet, Take 1 mg by mouth daily, Disp: , Rfl:     aspirin 81 mg chewable tablet, Chew 81 mg daily, Disp: , Rfl:     atorvastatin (LIPITOR) 40 mg tablet, Take 40 mg by mouth daily, Disp: , Rfl:     benzonatate (TESSALON PERLES) 100 mg capsule, Take 1 capsule (100 mg total) by mouth every 8 (eight) hours (Patient not taking: Reported on 7/11/2024), Disp: 21 capsule, Rfl: 0    clotrimazole (LOTRIMIN) 1 % cream, Apply to affected area 2 times daily for 4 weeks (Patient not taking: Reported on 7/11/2024), Disp: 15 g, Rfl:  0    collagenase (SANTYL) ointment, Apply topically daily To wound of the RLE, Disp: 90 g, Rfl: 1    HYDROcodone-acetaminophen (Norco) 5-325 mg per tablet, Take 1 tablet by mouth every 6 (six) hours as needed for pain Max Daily Amount: 4 tablets (Patient not taking: Reported on 7/11/2024), Disp: 15 tablet, Rfl: 0    ibuprofen (MOTRIN) 800 mg tablet, Take 1 tablet (800 mg total) by mouth every 8 (eight) hours as needed for mild pain (Patient not taking: Reported on 1/12/2022), Disp: 45 tablet, Rfl: 0    levothyroxine 88 mcg tablet, Take 50 mcg by mouth daily, Disp: , Rfl:     methylPREDNISolone 4 MG tablet therapy pack, Use as directed on package (Patient not taking: Reported on 1/12/2022), Disp: 21 tablet, Rfl: 0    Multiple Vitamins-Minerals (CENTRUM SILVER 50+WOMEN PO), Take 1 tablet by mouth daily, Disp: , Rfl:     sertraline (ZOLOFT) 25 mg tablet, Take 25 mg by mouth daily, Disp: , Rfl:     torsemide (DEMADEX) 10 mg tablet, Take 10 mg by mouth daily, Disp: , Rfl:     Verzenio 100 MG TABS, Take 1 tablet by mouth 2 (two) times a day, Disp: , Rfl:     Review of Systems   Constitutional:  Negative for chills and fever.   Respiratory:  Negative for shortness of breath.    Cardiovascular:  Positive for leg swelling.   Skin:  Positive for wound (RLE).   Psychiatric/Behavioral:  Positive for dysphoric mood.          Objective:  /70   Pulse 83   Temp (!) 97.1 °F (36.2 °C)   Resp 20   Pain Score: 0-No pain     Physical Exam  Vitals reviewed.   Cardiovascular:      Rate and Rhythm: Normal rate.      Pulses:           Dorsalis pedis pulses are 2+ on the right side and 2+ on the left side.        Posterior tibial pulses are 2+ on the right side and 2+ on the left side.   Pulmonary:      Effort: Pulmonary effort is normal. No respiratory distress.   Musculoskeletal:      Right lower leg: Edema present.      Left lower leg: Edema present.   Skin:     Findings: Rash and wound present. No erythema.      Comments: VSU of  the R medial lower leg. There is a significant amount of slough present along with formation of granular buds. Drainage is moderate. Periwound without diffuse erythema. There is a petechial rash consistent with stasis dermatitis present on the lower leg.      Neurological:      Mental Status: She is alert.         Wound 09/05/24 Venous Ulcer Leg Distal;Right (Active)   Wound Image   10/21/24 1334   Wound Description Slough;Yellow;Granulation tissue;Beefy red 10/21/24 1334   Joy-wound Assessment Edema;Rash;Purple 10/21/24 1334   Wound Length (cm) 5.4 cm 10/21/24 1334   Wound Width (cm) 3.6 cm 10/21/24 1334   Wound Depth (cm) 0.1 cm 10/21/24 1334   Wound Surface Area (cm^2) 19.44 cm^2 10/21/24 1334   Wound Volume (cm^3) 1.944 cm^3 10/21/24 1334   Calculated Wound Volume (cm^3) 1.94 cm^3 10/21/24 1334   Change in Wound Size % -94 10/21/24 1334   Drainage Amount Moderate 10/21/24 1334   Drainage Description Thurman;Green 10/21/24 1334   Non-staged Wound Description Full thickness 10/21/24 1334   Treatments Cleansed 10/21/24 1334   Patient Tolerance Tolerated well 10/09/24 0839   Dressing Status Intact;Old drainage 10/21/24 1334               Wound Instructions:  Orders Placed This Encounter   Procedures    Wound cleansing and dressings Venous Ulcer Distal;Right Leg     Right Lower leg ulcer    Wash your hands with soap and water.  Remove old dressing, discard into plastic bag and place in trash.    Cleanse the wound with Mild soap and water--such as Dove, rinse well and gently pat dry prior to applying a clean dressing. Do not use tissue or cotton balls.   Do not scrub the wound. Pat dry using gauze.    Shower yes ---do not soak in tub, pool, [place], etc    Apply topical steroid to skin surrounding wound    Apply Polymem AG to the wound.  Cover with ABD Pad  Secure with yared and tape  Change dressing 3 x weekly and as needed    Recommend wearing Spandagrip for compression and to assist with decreasing the  "swelling      The topical steroid ointment will be sent to your pharmacy--please  at your pharmacy today and apply only to the skin that surrounds the wound--do not use no longer than 2 weeks      Follow up in 1 week      Supplies will be ordered with [facility]--they will be delivered to your house     Standing Status:   Future     Standing Expiration Date:   10/28/2024       Carmen Hernandez PA-C      Portions of the record may have been created with voice recognition software. Occasional wrong word or \"sound alike\" substitutions may have occurred due to the inherent limitations of voice recognition software. Read the chart carefully and recognize, using context, where substitutions have occurred.    "

## 2024-10-21 NOTE — PROGRESS NOTES
Wound Procedure Treatment Venous Ulcer Distal;Right Leg    Performed by: Dara Pozo RN  Authorized by: Carmen Hernandez PA-C    Associated wounds:   Wound 09/05/24 Venous Ulcer Leg Distal;Right  Wound cleansed with:  Soap and water  Applied to periwound:  Steroid cream / ointment  Applied primary dressing:  Polymem foam and Silver  Applied secondary dressing:  ABD  Dressing secured with:  Pedro, Tape, Elastic tubular stocking and Size F

## 2024-10-28 PROCEDURE — 93925 LOWER EXTREMITY STUDY: CPT | Performed by: SURGERY

## 2024-10-28 PROCEDURE — 93922 UPR/L XTREMITY ART 2 LEVELS: CPT | Performed by: SURGERY

## 2024-10-29 ENCOUNTER — TELEPHONE (OUTPATIENT)
Facility: HOSPITAL | Age: 64
End: 2024-10-29

## 2024-10-29 PROCEDURE — NC001 PR NO CHARGE: Performed by: STUDENT IN AN ORGANIZED HEALTH CARE EDUCATION/TRAINING PROGRAM

## 2024-10-31 ENCOUNTER — OFFICE VISIT (OUTPATIENT)
Facility: HOSPITAL | Age: 64
End: 2024-10-31
Payer: COMMERCIAL

## 2024-10-31 VITALS
DIASTOLIC BLOOD PRESSURE: 71 MMHG | SYSTOLIC BLOOD PRESSURE: 154 MMHG | RESPIRATION RATE: 20 BRPM | TEMPERATURE: 97.4 F | HEART RATE: 90 BPM

## 2024-10-31 DIAGNOSIS — I83.018 VENOUS STASIS ULCER OF OTHER PART OF RIGHT LOWER LEG WITH FAT LAYER EXPOSED, UNSPECIFIED WHETHER VARICOSE VEINS PRESENT (HCC): Primary | ICD-10-CM

## 2024-10-31 DIAGNOSIS — E11.69 TYPE 2 DIABETES MELLITUS WITH OTHER SPECIFIED COMPLICATION, WITHOUT LONG-TERM CURRENT USE OF INSULIN (HCC): ICD-10-CM

## 2024-10-31 DIAGNOSIS — L97.812 VENOUS STASIS ULCER OF OTHER PART OF RIGHT LOWER LEG WITH FAT LAYER EXPOSED, UNSPECIFIED WHETHER VARICOSE VEINS PRESENT (HCC): Primary | ICD-10-CM

## 2024-10-31 PROCEDURE — 99213 OFFICE O/P EST LOW 20 MIN: CPT

## 2024-10-31 NOTE — PROGRESS NOTES
Patient ID: Martha Payan is a 64 y.o. female Date of Birth 1960       Chief Complaint   Patient presents with    Follow Up Wound Care Visit     Right leg ulcer, patient reports increased pain in the wound this week          Allergies:  Sulfamethoxazole-trimethoprim    Diagnosis:      Diagnosis ICD-10-CM Associated Orders   1. Venous stasis ulcer of other part of right lower leg with fat layer exposed, unspecified whether varicose veins present ([facility])  I83.018 Wound cleansing and dressings Venous Ulcer Distal;Right Leg    L97.812       2. Type 2 diabetes mellitus with other specified complication, without long-term current use of insulin ([facility])  E11.69               Assessment & Plan:  Right lower extremity venous ulcer, measuring slightly larger today however wound bed appears improved with less devitalized tissue present.  Patient adamantly declining to use PolyMem Ag anymore due to discomfort and desires to go back to using Santyl.  Will recommend for patient to use Santyl to the wound daily and keep the wound covered at all times to reduce infection.  Patient declines any debridement of her wound.  Recent DAHIANA studies are within normal limits. Continue with tubigrip for compression, and steroid cream for venous stasis dermatitis for another week, which also appears to be improving  See wounds orders below  Wound is not showing any clinical s/s of infection.  Elevate lower extremities and avoid prolonged standing/keeping legs in dependent position for edema management.   Counseled patient on the importance of tight glycemic control, and adequate protein intake (3-4 servings per day) to promote wound healing.   A1C results reviewed with the patient today.   Follow-up in 1 week(s). Call sooner with questions or concerns or any signs of infection such as redness, swelling, increased/purulent drainage, fever or chills, and increased pain.  Patient verbalized understanding and agrees with plan of care.         Subjective:   10/31/24: Patient presents to wound care center for follow-up visit of right lower extremity venous ulcer.  Patient reporting that PolyMem Ag dressing has increased her wound pain and she does not want to use it anymore.  Patient states that she would prefer to use Santyl because this was a much more comfortable dressing.  Patient states that she has plenty of Santyl at home and does not need a new prescription.  Patient has been tolerating tubi- for compression without issue and has been elevating her legs as much as possible. Patient denies increased drainage. No fever or chills.         The following portions of the patient's history were reviewed and updated as appropriate:   There is no problem list on file for this patient.    Past Medical History:   Diagnosis Date    Breast cancer (HCC)     Hypothyroidism     Hypothyroidism     Kidney stones      Past Surgical History:   Procedure Laterality Date    TUBAL LIGATION       No family history on file.   Social History     Socioeconomic History    Marital status:      Spouse name: None    Number of children: None    Years of education: None    Highest education level: None   Occupational History    None   Tobacco Use    Smoking status: Never    Smokeless tobacco: Never   Vaping Use    Vaping status: Never Used   Substance and Sexual Activity    Alcohol use: Not Currently    Drug use: Never    Sexual activity: None   Other Topics Concern    None   Social History Narrative    None     Social Determinants of Health     Financial Resource Strain: High Risk (8/27/2024)    Received from [facility]    Overall Financial Resource Strain (CARDIA)     Difficulty of Paying Living Expenses: Very hard   Food Insecurity: Food Insecurity Present (8/27/2024)    Received from [facility]    Hunger Vital Sign     Worried About Running Out of Food in the Last Year: Sometimes true     Ran Out of Food in the Last Year:  Sometimes true   Transportation Needs: No Transportation Needs (8/27/2024)    Received from [facility]    PRAPARE - Transportation     Lack of Transportation (Medical): No     Lack of Transportation (Non-Medical): No   Physical Activity: Not on file   Stress: Not on file   Social Connections: Not on file   Intimate Partner Violence: Patient Unable To Answer (8/27/2024)    Received from [facility]    Humiliation, Afraid, Rape, and Kick questionnaire     Fear of Current or Ex-Partner: Patient unable to answer     Emotionally Abused: Patient unable to answer     Physically Abused: Patient unable to answer     Sexually Abused: Patient unable to answer   Housing Stability: Low Risk  (8/27/2024)    Received from [facility]    Housing Stability Vital Sign     Unable to Pay for Housing in the Last Year: No     Number of Times Moved in the Last Year: 0     Homeless in the Last Year: No        Current Outpatient Medications:     acetaminophen (TYLENOL) 650 mg CR tablet, Take 650 mg by mouth every 8 (eight) hours as needed for mild pain, Disp: , Rfl:     anastrozole (ARIMIDEX) 1 mg tablet, Take 1 mg by mouth daily, Disp: , Rfl:     aspirin 81 mg chewable tablet, Chew 81 mg daily, Disp: , Rfl:     atorvastatin (LIPITOR) 40 mg tablet, Take 40 mg by mouth daily, Disp: , Rfl:     benzonatate (TESSALON PERLES) 100 mg capsule, Take 1 capsule (100 mg total) by mouth every 8 (eight) hours (Patient not taking: Reported on 7/11/2024), Disp: 21 capsule, Rfl: 0    clotrimazole (LOTRIMIN) 1 % cream, Apply to affected area 2 times daily for 4 weeks (Patient not taking: Reported on 7/11/2024), Disp: 15 g, Rfl: 0    collagenase (SANTYL) ointment, Apply topically daily To wound of the RLE, Disp: 90 g, Rfl: 1    HYDROcodone-acetaminophen (Norco) 5-325 mg per tablet, Take 1 tablet by mouth every 6 (six) hours as needed for pain Max Daily Amount: 4 tablets (Patient not taking: Reported on  7/11/2024), Disp: 15 tablet, Rfl: 0    ibuprofen (MOTRIN) 800 mg tablet, Take 1 tablet (800 mg total) by mouth every 8 (eight) hours as needed for mild pain (Patient not taking: Reported on 1/12/2022), Disp: 45 tablet, Rfl: 0    levothyroxine 88 mcg tablet, Take 50 mcg by mouth daily, Disp: , Rfl:     methylPREDNISolone 4 MG tablet therapy pack, Use as directed on package (Patient not taking: Reported on 1/12/2022), Disp: 21 tablet, Rfl: 0    Multiple Vitamins-Minerals (CENTRUM SILVER 50+WOMEN PO), Take 1 tablet by mouth daily, Disp: , Rfl:     sertraline (ZOLOFT) 25 mg tablet, Take 25 mg by mouth daily, Disp: , Rfl:     torsemide (DEMADEX) 10 mg tablet, Take 10 mg by mouth daily, Disp: , Rfl:     triamcinolone (KENALOG) 0.1 % ointment, Apply topically 2 (two) times a day for 14 days To rash of RLE. Avoid open wound, Disp: 30 g, Rfl: 1    Verzenio 100 MG TABS, Take 1 tablet by mouth 2 (two) times a day, Disp: , Rfl:     Review of Systems   Constitutional:  Negative for chills and fever.   HENT:  Negative for congestion and sneezing.    Respiratory:  Negative for cough and shortness of breath.    Cardiovascular:  Positive for leg swelling.   Skin:  Positive for wound.        RLE   Psychiatric/Behavioral:  Negative for agitation.        Objective:  /71   Pulse 90   Temp (!) 97.4 °F (36.3 °C)   Resp 20   Pain Score: 10-Worst pain ever     Physical Exam  Constitutional:       General: She is awake. She is not in acute distress.     Appearance: She is not ill-appearing, toxic-appearing or diaphoretic.   HENT:      Head: Normocephalic and atraumatic.      Right Ear: External ear normal.      Left Ear: External ear normal.   Eyes:      Conjunctiva/sclera: Conjunctivae normal.   Pulmonary:      Effort: Pulmonary effort is normal. No respiratory distress.   Musculoskeletal:      Right lower leg: Edema present.   Skin:     General: Skin is warm and dry.      Findings: Wound present.      Comments: 1. RLE venous  ulcer. Joy-wound with improving venous stasis dermatitis. No erythema, warmth or lymphangitic streaking to suggest cellulitis.  Refer to wound assessment for additional details.   Neurological:      Mental Status: She is alert.         Wound 09/05/24 Venous Ulcer Leg Distal;Right (Active)   Wound Image   10/31/24 1029   Wound Description Slough;Yellow;Granulation tissue;Beefy red;Pink;Gray 10/31/24 1037   Joy-wound Assessment Edema;Rash;Purple;Pink 10/31/24 1037   Wound Length (cm) 5.6 cm 10/31/24 1037   Wound Width (cm) 4.3 cm 10/31/24 1037   Wound Depth (cm) 0.1 cm 10/31/24 1037   Wound Surface Area (cm^2) 24.08 cm^2 10/31/24 1037   Wound Volume (cm^3) 2.408 cm^3 10/31/24 1037   Calculated Wound Volume (cm^3) 2.41 cm^3 10/31/24 1037   Change in Wound Size % -141 10/31/24 1037   Drainage Amount Moderate 10/31/24 1037   Drainage Description Serosanguineous 10/31/24 1037   Non-staged Wound Description Full thickness 10/31/24 1037   Treatments Cleansed 10/31/24 1037   Patient Tolerance Tolerated poorly 10/31/24 1037   Dressing Status Intact 10/31/24 1037                 Lab Results   Component Value Date    HGBA1C 5.6 08/28/2024       Wound Instructions:  Orders Placed This Encounter   Procedures    Wound cleansing and dressings Venous Ulcer Distal;Right Leg     Right Lower leg ulcer     Wash your hands with soap and water.  Remove old dressing, discard into plastic bag and place in trash.    Cleanse the wound with Mild soap and water--such as Dove, rinse well and gently pat dry prior to applying a clean dressing. Do not use tissue or cotton balls.   Do not scrub the wound. Pat dry using gauze.     Shower yes ---do not soak in tub, pool, [place], etc     Apply topical steroid to skin surrounding wound  apply only to the skin that surrounds the wound-- continue for 1 week     Apply Santyl to the wound.  Cover with ABD Pad  Secure with yared and tape  Change dressing Daily and as needed     Recommend wearing  "Spandagrip for compression and to assist with decreasing the swelling     Please call the Wound Management Center Monday through Friday between 8-430 for any questions.      Monitor for any signs or symptoms of infection such as increase redness or pain, fever/chills, nausea/vomiting, malodor, or  increased drainage.   If you have any signs or symptoms please call the wound center, if after hours or on holiday/weekend call your primary care provider or go to the emergency department to be evaluated     Standing Status:   Future     Standing Expiration Date:   11/7/2024           RIANNA Mckeon, JESSICA, DONG    Portions of the record may have been created with voice recognition software. Occasional wrong word or \"sound alike\" substitutions may have occurred due to the inherent limitations of voice recognition software. Read the chart carefully and recognize, using context, where substitutions have occurred.          Total time spent today:    Total time (face-to-face and non-face-to-face) spent on today's visit was 14 minutes. This includes preparation for the visits (i.e. reviewing test results from date recent hospitalizations, ER/Urgent Care/primary care visits and recent consultant office visits), performance of a medically appropriate history and examination, and orders for medications or testing.   "

## 2024-10-31 NOTE — PATIENT INSTRUCTIONS
Orders Placed This Encounter   Procedures    Wound cleansing and dressings Venous Ulcer Distal;Right Leg     Right Lower leg ulcer     Wash your hands with soap and water.  Remove old dressing, discard into plastic bag and place in trash.    Cleanse the wound with Mild soap and water--such as Dove, rinse well and gently pat dry prior to applying a clean dressing. Do not use tissue or cotton balls.   Do not scrub the wound. Pat dry using gauze.     Shower yes ---do not soak in tub, pool, [place], etc     Apply topical steroid to skin surrounding wound  apply only to the skin that surrounds the wound-- continue for 1 week     Apply Santyl to the wound.  Cover with ABD Pad  Secure with yared and tape  Change dressing Daily and as needed     Recommend wearing Spandagrip for compression and to assist with decreasing the swelling     Please call the Wound Management Center Monday through Friday between 8-430 for any questions.      Monitor for any signs or symptoms of infection such as increase redness or pain, fever/chills, nausea/vomiting, malodor, or  increased drainage.   If you have any signs or symptoms please call the wound center, if after hours or on holiday/weekend call your primary care provider or go to the emergency department to be evaluated     Standing Status:   Future     Standing Expiration Date:   11/7/2024

## 2024-10-31 NOTE — PROGRESS NOTES
Wound Procedure Treatment Venous Ulcer Distal;Right Leg    Performed by: Jen Chin RN  Authorized by: RIANNA Cortez    Associated wounds:   Wound 09/05/24 Venous Ulcer Leg Distal;Right  Wound cleansed with:  NSS  Applied secondary dressing:  Gauze and ABD  Dressing secured with:  Pedro, Tape, Elastic tubular stocking and Size F

## 2024-11-07 ENCOUNTER — OFFICE VISIT (OUTPATIENT)
Facility: HOSPITAL | Age: 64
End: 2024-11-07
Payer: COMMERCIAL

## 2024-11-07 VITALS
RESPIRATION RATE: 20 BRPM | HEART RATE: 87 BPM | DIASTOLIC BLOOD PRESSURE: 61 MMHG | SYSTOLIC BLOOD PRESSURE: 132 MMHG | TEMPERATURE: 96.8 F

## 2024-11-07 DIAGNOSIS — I83.018 VENOUS STASIS ULCER OF OTHER PART OF RIGHT LOWER LEG WITH FAT LAYER EXPOSED, UNSPECIFIED WHETHER VARICOSE VEINS PRESENT (HCC): Primary | ICD-10-CM

## 2024-11-07 DIAGNOSIS — E11.69 TYPE 2 DIABETES MELLITUS WITH OTHER SPECIFIED COMPLICATION, WITHOUT LONG-TERM CURRENT USE OF INSULIN (HCC): ICD-10-CM

## 2024-11-07 DIAGNOSIS — Z91.199 NON COMPLIANCE WITH MEDICAL TREATMENT: ICD-10-CM

## 2024-11-07 DIAGNOSIS — L97.812 VENOUS STASIS ULCER OF OTHER PART OF RIGHT LOWER LEG WITH FAT LAYER EXPOSED, UNSPECIFIED WHETHER VARICOSE VEINS PRESENT (HCC): Primary | ICD-10-CM

## 2024-11-07 PROCEDURE — 99214 OFFICE O/P EST MOD 30 MIN: CPT | Performed by: STUDENT IN AN ORGANIZED HEALTH CARE EDUCATION/TRAINING PROGRAM

## 2024-11-07 PROCEDURE — 99213 OFFICE O/P EST LOW 20 MIN: CPT | Performed by: STUDENT IN AN ORGANIZED HEALTH CARE EDUCATION/TRAINING PROGRAM

## 2024-11-07 NOTE — PROGRESS NOTES
"Patient ID: Martha Payan is a 64 y.o. female Date of Birth 1960       Chief Complaint   Patient presents with    Follow Up Wound Care Visit     Right lower leg ulcer        Allergies:  Sulfamethoxazole-trimethoprim    Diagnosis:   Diagnosis ICD-10-CM Associated Orders   1. Venous stasis ulcer of other part of right lower leg with fat layer exposed, unspecified whether varicose veins present ([facility])  I83.018 Wound cleansing and dressings Venous Ulcer Distal;Right Leg    L97.812 Wound Procedure Treatment Venous Ulcer Distal;Right Leg      2. Type 2 diabetes mellitus with other specified complication, without long-term current use of insulin ([facility])  E11.69       3. Non compliance with medical treatment  Z91.199            Assessment  & Plan:    F/u VSU of the R medial lower leg. Circular ulceration with adherent slough present overlying unhealthy appearing granulation tissue. Drainage is small-moderate. There are new areas of skin breakdown surrounding the ulcer. No diffuse erythema to indicate cellulitis on exam.   Discussed with patient that in order for the ulceration to heal, the swelling of her leg must be properly controlled with compression. She is adamant that she does not want to be placed in a compression wrap.   Also recommending debridement of the ulceration given slough and unhealthy tissue is promoting inflammation and delaying healing. Pt is adamant that she does not want the ulcer to be debrided.   Martha would like to proceed with daily cleansing of the ulcer with Vashe solution and application of Santyl without formal compression. I did discuss with her that I do not think this plan of care is going to be beneficial for her or result in healing particularly without adequate compression however she states she \"knows it will heal if she does it this way\". Unfortunately there is not much more I can currently offer her if she is unwilling to allow debridement or trial compression.   Will see her " back in three weeks for check to see if any progress has been made with healing and further discussion compression again at that time if no progress has been made in three weeks.          Subjective:   07/12/24: 65 y/o F with PMHx of DM, breast CA, hypothyroidism, peripheral edema presents for evaluation of venous ulcerations of bilateral lower legs. She was previously seen by the wound care center and was healed in February. She reports that she was healed for about one month and then started noticing her legs ulcerations again; she reports she worse her compression for approximately one month after being healed in February and then d/c use of compression. She reports being very frustrated that she had just underwent a long process with wound care so recently and was upset the ulcers returned therefore she did not want to come back to wound care immediately and tried to treat the ulcers on her own with a variety of creams. She was visited by her daughter in July whom saw pt's legs and how red they were and suggested she go to urgent care for urgent evaluation. Pt presented to urgent care on 07/07/24 and was diagnosed with bilateral leg cellulitis and was started on Clindamycin. Pt notes that after two days of taking Clindamycin she noticed a heavy feeling in her chest and therefore her PCP d/c the Clindamycin and started her on Levaquin on 07/09/24 which she has been taking since. It was also recommended that she apply Bacitracin ointment and cleanse her wounds daily. She would like to know why her wounds have returned and how we can cure her. Is questioning why we previously had her in multilayer wraps that were changed weekly when she was recently told she should be changing her dressings daily.      07/18/24: Pt presents for f/u of bilateral lower extremity venous ulcerations. She reports she received her supplies and has been using silver alginate changed every other day with Spandagrips for compression. She  "has been taking her Keflex as prescribed and reports the redness in her legs has improved from prior visit. Reports her children are concern that her ulcerations returned/were not cured with prior wound care therefore they have made an appointment at [facility] for her for a second opinion regarding management. She denies fevers, malaise. Reports her RLE is tender today therefore she does not wish to have a debridement performed.     09/05/24: Pt presents for f/u of bilateral lower extremity ulcerations. She sought a second opinion at [facility] and triad paste with compression stockings was being utilized however she wished to return here for care. Reports tenderness in the RLE wound. Reports her LLE is now healed without any ulcerations. Unfortunately had a stroke since her prior visit; no obvious residual deficits in terms of weakness but reports her brain is occasionally feeling \"foggy\"; is working to establish a f/u visit with neurology. Reports her license has been taken away.     9/23/2024: HPI per Dr. Santoyo, \"Follow-up of venous stasis ulcer of the right lower extremity.  Last visit was on 9/5/2024.  She states that she was here last week but after waiting 1 hour in the waiting room she left.  She was very upset after her last visit because of the debridement.  She states that after the debridement, she had pain which persists and makes it difficult for her to sleep.  She developed a rash around the wound and noticed some odor.  She denies any fever or chills.\"    09/30/24: Pt presents for f/u VSU of the RLE. She reports she has continued with use of Santyl and washing the wound with Vashe solution as instructed at her previous visit. She reports taking her antibiotic as prescribed and denies experiencing any SE from the Levaquin. Today is her last day in her antibiotic course. Has been using Spandagrip for compression. Does not wish to have any debridement performed today. " "    10/09/24: Patient presents for follow-up of right lower extremity venous ulceration.  She reports that she has been cleansing with Vashe and applying Santyl daily as instructed.    10/16/24: Pt presents for f/u RLE VSU. She has been wearing her Unna boot since previous visit. Reports itching of her entire leg with use of Unna boot. Is upset that the wound is not healing faster or measuring significantly smaller than her previous visit. States that she has been showering with multilayer compression wrap on, reports using a garbage bag and tape secured over the wrap in order to shower.     10/21/24: Pt presents for f/u RLE VSU. Reports she had significant itching and discomfort with use of Coflex lite. She is upset that she has not seen more progress with her wound healing. Reports she would like to return to doing things \"her way\" in terms of wound care. Has gone for formal arterial testing this morning. Reports being on her legs a significant amount this week, she reports having to walk about 2 miles each way to the eye doctor in order to get paperwork saying her license could be reinstated.     11/07/24: Pt presents for f/u RLE VSU. Since her previous visit she was seen by vascular surgery at [facility] on 11/01/24. Per chart review, it was discussed at her visit that the mainstay of treatment is compression therapy and the office recommended multilayer compression wrappings however it was noted Martha did not want to keep the dressing on for a whole week and she would prefer to continue with local wound care. It was recommended she use an ACE for more compression on her legs and be seen back in 3 months for a standing venous duplex to assess reflux. Since her previous visit she also went for arterial testing which demonstrated ABIs within the normal range. On evaluation today pt reports she does not plan to trial compression therapy and would like to continue cleansing the wound daily and applying " Marivel.             The following portions of the patient's history were reviewed and updated as appropriate:   There is no problem list on file for this patient.    Past Medical History:   Diagnosis Date    Breast cancer (HCC)     Hypothyroidism     Hypothyroidism     Kidney stones      Past Surgical History:   Procedure Laterality Date    TUBAL LIGATION       No family history on file.  Social History     Socioeconomic History    Marital status:      Spouse name: None    Number of children: None    Years of education: None    Highest education level: None   Occupational History    None   Tobacco Use    Smoking status: Never    Smokeless tobacco: Never   Vaping Use    Vaping status: Never Used   Substance and Sexual Activity    Alcohol use: Not Currently    Drug use: Never    Sexual activity: None   Other Topics Concern    None   Social History Narrative    None     Social Determinants of Health     Financial Resource Strain: High Risk (8/27/2024)    Received from [facility]    Overall Financial Resource Strain (CARDIA)     Difficulty of Paying Living Expenses: Very hard   Food Insecurity: Food Insecurity Present (8/27/2024)    Received from [facility]    Hunger Vital Sign     Worried About Running Out of Food in the Last Year: Sometimes true     Ran Out of Food in the Last Year: Sometimes true   Transportation Needs: No Transportation Needs (8/27/2024)    Received from [facility]    PRAPARE - Transportation     Lack of Transportation (Medical): No     Lack of Transportation (Non-Medical): No   Physical Activity: Not on file   Stress: Not on file   Social Connections: Not on file   Intimate Partner Violence: Patient Unable To Answer (8/27/2024)    Received from [facility]    Humiliation, Afraid, Rape, and Kick questionnaire     Fear of Current or Ex-Partner: Patient unable to answer     Emotionally Abused: Patient unable to answer      Physically Abused: Patient unable to answer     Sexually Abused: Patient unable to answer   Housing Stability: Low Risk  (8/27/2024)    Received from [facility]    Housing Stability Vital Sign     Unable to Pay for Housing in the Last Year: No     Number of Times Moved in the Last Year: 0     Homeless in the Last Year: No       Current Outpatient Medications:     acetaminophen (TYLENOL) 650 mg CR tablet, Take 650 mg by mouth every 8 (eight) hours as needed for mild pain, Disp: , Rfl:     anastrozole (ARIMIDEX) 1 mg tablet, Take 1 mg by mouth daily, Disp: , Rfl:     aspirin 81 mg chewable tablet, Chew 81 mg daily, Disp: , Rfl:     atorvastatin (LIPITOR) 40 mg tablet, Take 40 mg by mouth daily, Disp: , Rfl:     benzonatate (TESSALON PERLES) 100 mg capsule, Take 1 capsule (100 mg total) by mouth every 8 (eight) hours (Patient not taking: Reported on 7/11/2024), Disp: 21 capsule, Rfl: 0    clotrimazole (LOTRIMIN) 1 % cream, Apply to affected area 2 times daily for 4 weeks (Patient not taking: Reported on 7/11/2024), Disp: 15 g, Rfl: 0    collagenase (SANTYL) ointment, Apply topically daily To wound of the RLE, Disp: 90 g, Rfl: 1    HYDROcodone-acetaminophen (Norco) 5-325 mg per tablet, Take 1 tablet by mouth every 6 (six) hours as needed for pain Max Daily Amount: 4 tablets (Patient not taking: Reported on 7/11/2024), Disp: 15 tablet, Rfl: 0    ibuprofen (MOTRIN) 800 mg tablet, Take 1 tablet (800 mg total) by mouth every 8 (eight) hours as needed for mild pain (Patient not taking: Reported on 1/12/2022), Disp: 45 tablet, Rfl: 0    levothyroxine 88 mcg tablet, Take 50 mcg by mouth daily, Disp: , Rfl:     methylPREDNISolone 4 MG tablet therapy pack, Use as directed on package (Patient not taking: Reported on 1/12/2022), Disp: 21 tablet, Rfl: 0    Multiple Vitamins-Minerals (CENTRUM SILVER 50+WOMEN PO), Take 1 tablet by mouth daily, Disp: , Rfl:     sertraline (ZOLOFT) 25 mg tablet, Take 25 mg by mouth  daily, Disp: , Rfl:     torsemide (DEMADEX) 10 mg tablet, Take 10 mg by mouth daily, Disp: , Rfl:     triamcinolone (KENALOG) 0.1 % ointment, Apply topically 2 (two) times a day for 14 days To rash of RLE. Avoid open wound, Disp: 30 g, Rfl: 1    Verzenio 100 MG TABS, Take 1 tablet by mouth 2 (two) times a day, Disp: , Rfl:     Review of Systems   Constitutional:  Negative for chills and fever.   Respiratory:  Negative for shortness of breath.    Cardiovascular:  Positive for leg swelling.   Skin:  Positive for wound (RLE).   Psychiatric/Behavioral:  Positive for dysphoric mood.          Objective:  /61   Pulse 87   Temp (!) 96.8 °F (36 °C)   Resp 20   Pain Score:   6     Physical Exam  Vitals reviewed.   Cardiovascular:      Rate and Rhythm: Normal rate.      Pulses:           Dorsalis pedis pulses are 2+ on the right side and 2+ on the left side.        Posterior tibial pulses are 2+ on the right side and 2+ on the left side.   Pulmonary:      Effort: Pulmonary effort is normal. No respiratory distress.   Musculoskeletal:      Right lower leg: Edema present.      Left lower leg: Edema present.   Skin:     Findings: Wound present. No erythema.      Comments: VSU of the R medial lower leg. Circular ulceration with adherent slough present overlying unhealthy appearing granulation tissue. Drainage is small-moderate. There are new areas of skin breakdown surrounding the ulcer. No diffuse erythema to indicate cellulitis on exam.      Neurological:      Mental Status: She is alert.           Wound 09/05/24 Venous Ulcer Leg Distal;Right (Active)   Wound Image    11/07/24 1140   Wound Description Slough;Yellow;Granulation tissue;Beefy red;Pink;Gray;Other (Comment);Epithelialization 11/07/24 1147   Joy-wound Assessment Edema;Rash;[place] 11/07/24 1147   Wound Length (cm) 9.6 cm 11/07/24 1147   Wound Width (cm) 7.6 cm 11/07/24 1147   Wound Depth (cm) 0.2 cm 11/07/24 1147   Wound Surface Area (cm^2) 72.96 cm^2 11/07/24  1147   Wound Volume (cm^3) 14.592 cm^3 11/07/24 1147   Calculated Wound Volume (cm^3) 14.59 cm^3 11/07/24 1147   Change in Wound Size % -1359 11/07/24 1147   Drainage Amount Moderate 11/07/24 1147   Drainage Description Serosanguineous;Serous 11/07/24 1147   Non-staged Wound Description Full thickness 11/07/24 1147   Treatments Cleansed 11/07/24 1147   Patient Tolerance Tolerated well 11/07/24 1147   Dressing Status Intact;Old drainage 11/07/24 1147                   Wound Instructions:  Orders Placed This Encounter   Procedures    Wound cleansing and dressings Venous Ulcer Distal;Right Leg     Right Lower leg ulcer     Wash your hands with soap and water.  Remove old dressing, discard into plastic bag and place in trash.    Cleanse the wound with Vasche wound cleanser, rinse well and gently pat dry prior to applying a clean dressing. Do not use tissue or cotton balls.   Do not scrub the wound. Pat dry using gauze.     Shower yes ---do not soak in tub, pool, ocean lake, etc      Apply Santyl to the wound.  Cover with ABD Pad  Secure with yared and tape  Change dressing Daily and as needed for increased drainage, leakage or displacement  Spandagrip F right lower leg      Elastic Tubular Stocking-spandagrip   Tubular elastic bandage: Apply from base of toes to behind the knee. Apply in AM, may remove for sleep.  Avoid prolonged standing in one place.  Elevate leg(s) above the level of the heart when sitting or as much as possible.       Recommend wearing Spandagrip for compression and to assist with decreasing the swelling     Please call the Wound Management Center Monday through Friday between 8-430 for any questions.       Monitor for any signs or symptoms of infection such as increase redness or pain, fever/chills, nausea/vomiting, malodor, or  increased drainage, red streaks   If you have any signs or symptoms please call the wound center, if after hours or on holiday/weekend call your primary care provider or go  "to the emergency department to be evaluated    Follow up in 3 weeks     Standing Status:   Future     Standing Expiration Date:   11/28/2024    Wound Procedure Treatment Venous Ulcer Distal;Right Leg     This order was created via procedure documentation       Cally Hernandez, PA-C        Portions of the record may have been created with voice recognition software. Occasional wrong word or \"sound alike\" substitutions may have occurred due to the inherent limitations of voice recognition software. Read the chart carefully and recognize, using context, where substitutions have occurred.    "

## 2024-11-07 NOTE — PROGRESS NOTES
Wound Procedure Treatment Venous Ulcer Distal;Right Leg    Performed by: Jen Chin RN  Authorized by: Carmen Hernandez PA-C    Associated wounds:   Wound 09/05/24 Venous Ulcer Leg Distal;Right  Wound cleansed with:  NSS  Applied secondary dressing:  ABD  Dressing secured with:  Pedro, Tape and Elastic tubular stocking  Comments:  Spandagrip F to lower leg

## 2024-11-07 NOTE — PATIENT INSTRUCTIONS
Orders Placed This Encounter   Procedures    Wound cleansing and dressings Venous Ulcer Distal;Right Leg     Right Lower leg ulcer     Wash your hands with soap and water.  Remove old dressing, discard into plastic bag and place in trash.    Cleanse the wound with Vasche wound cleanser, rinse well and gently pat dry prior to applying a clean dressing. Do not use tissue or cotton balls.   Do not scrub the wound. Pat dry using gauze.     Shower yes ---do not soak in tub, pool, ocean lake, etc      Apply Santyl to the wound.  Cover with ABD Pad  Secure with yared and tape  Change dressing Daily and as needed for increased drainage, leakage or displacement  Spandagrip F right lower leg      Elastic Tubular Stocking-spandagrip   Tubular elastic bandage: Apply from base of toes to behind the knee. Apply in AM, may remove for sleep.  Avoid prolonged standing in one place.  Elevate leg(s) above the level of the heart when sitting or as much as possible.       Recommend wearing Spandagrip for compression and to assist with decreasing the swelling     Please call the Wound Management Center Monday through Friday between 8-430 for any questions.       Monitor for any signs or symptoms of infection such as increase redness or pain, fever/chills, nausea/vomiting, malodor, or  increased drainage, red streaks   If you have any signs or symptoms please call the wound center, if after hours or on holiday/weekend call your primary care provider or go to the emergency department to be evaluated    Follow up in 3 weeks     Standing Status:   Future     Standing Expiration Date:   11/28/2024

## 2024-11-27 ENCOUNTER — OFFICE VISIT (OUTPATIENT)
Facility: HOSPITAL | Age: 64
End: 2024-11-27
Payer: COMMERCIAL

## 2024-11-27 VITALS
DIASTOLIC BLOOD PRESSURE: 72 MMHG | SYSTOLIC BLOOD PRESSURE: 133 MMHG | HEART RATE: 90 BPM | TEMPERATURE: 97 F | RESPIRATION RATE: 18 BRPM

## 2024-11-27 DIAGNOSIS — I83.018 VENOUS STASIS ULCER OF OTHER PART OF RIGHT LOWER LEG WITH FAT LAYER EXPOSED, UNSPECIFIED WHETHER VARICOSE VEINS PRESENT (HCC): Primary | ICD-10-CM

## 2024-11-27 DIAGNOSIS — E11.69 TYPE 2 DIABETES MELLITUS WITH OTHER SPECIFIED COMPLICATION, WITHOUT LONG-TERM CURRENT USE OF INSULIN (HCC): ICD-10-CM

## 2024-11-27 DIAGNOSIS — L97.812 VENOUS STASIS ULCER OF OTHER PART OF RIGHT LOWER LEG WITH FAT LAYER EXPOSED, UNSPECIFIED WHETHER VARICOSE VEINS PRESENT (HCC): Primary | ICD-10-CM

## 2024-11-27 DIAGNOSIS — Z91.199 NON COMPLIANCE WITH MEDICAL TREATMENT: ICD-10-CM

## 2024-11-27 PROCEDURE — 99213 OFFICE O/P EST LOW 20 MIN: CPT | Performed by: STUDENT IN AN ORGANIZED HEALTH CARE EDUCATION/TRAINING PROGRAM

## 2024-11-27 PROCEDURE — 99214 OFFICE O/P EST MOD 30 MIN: CPT | Performed by: STUDENT IN AN ORGANIZED HEALTH CARE EDUCATION/TRAINING PROGRAM

## 2024-11-27 NOTE — PROGRESS NOTES
Patient ID: Martha Payan is a 64 y.o. female Date of Birth 1960       No chief complaint on file.      Allergies:  Sulfamethoxazole-trimethoprim    Diagnosis:   Diagnosis ICD-10-CM Associated Orders   1. Venous stasis ulcer of other part of right lower leg with fat layer exposed, unspecified whether varicose veins present ([facility])  I83.018 Wound cleansing and dressings Venous Ulcer Distal;Right Leg    L97.812 Wound Procedure Treatment Venous Ulcer Distal;Right Leg      2. Type 2 diabetes mellitus with other specified complication, without long-term current use of insulin ([facility])  E11.69       3. Non compliance with medical treatment  Z91.199            Assessment  & Plan:    Follow-up large circular medial venous ulceration on the right lower extremity.  Unhealthy appearing granulation tissue present within the wound bed along with adherent slough.  Drainage is small to moderate.  There is no obvious erythema or lymphangitic streaking to suggest acute soft tissue infection  Patient refuses debridement of the ulcer.  Does not want to utilize any compression.  Discussed with patient that compression will be vital in achieving healing of a venous ulceratio.  Patient reports that she has a lot going on at the moment and is overwhelmed, feels depressed. Depression may be a factor contributing to non-compliance with medical treatment however she declines referral to psychologist or psychiatrist that was offered today. Denies SI.   Elevate legs when resting. Avoid prolonged standing in one place.   Pt prefers to continue washing ulcer with Vashe and using Santyl ointment daily.   Instructed to monitor for any changes including redness or swelling surrounding the wound, increased drainage or pain as well as fevers or chills.    Results of recent vascular studies below, ABIs wnl.   F/u in 3-4 weeks. Care is currently palliative d/t declining debridements and compression wraps. Instructed to call if any questions or  concerns arise in meantime.     VAS arterial duplex 10/21/24:     CONCLUSION:     Impression:  RIGHT LOWER LIMB:  No evidence of significant lower extremity arterial occlusive disease.  Ankle/Brachial index:  1.04 which is in the normal category  PVR/ PPG tracings are normal .  Metatarsal pressure of 162 mmHg  Great toe pressure of 103 mmHg, within the healing range        LEFT LOWER LIMB:  No evidence of significant lower extremity arterial occlusive disease.  Ankle/Brachial index:  0.97 which is in the normal category  PVR/ PPG tracings are normal.  Metatarsal pressure of 124 mmHg  Great toe pressure of 119 mmHg, within the healing range          Subjective:   07/12/24: 63 y/o F with PMHx of DM, breast CA, hypothyroidism, peripheral edema presents for evaluation of venous ulcerations of bilateral lower legs. She was previously seen by the wound care center and was healed in February. She reports that she was healed for about one month and then started noticing her legs ulcerations again; she reports she worse her compression for approximately one month after being healed in February and then d/c use of compression. She reports being very frustrated that she had just underwent a long process with wound care so recently and was upset the ulcers returned therefore she did not want to come back to wound care immediately and tried to treat the ulcers on her own with a variety of creams. She was visited by her daughter in July whom saw pt's legs and how red they were and suggested she go to urgent care for urgent evaluation. Pt presented to urgent care on 07/07/24 and was diagnosed with bilateral leg cellulitis and was started on Clindamycin. Pt notes that after two days of taking Clindamycin she noticed a heavy feeling in her chest and therefore her PCP d/c the Clindamycin and started her on Levaquin on 07/09/24 which she has been taking since. It was also recommended that she apply Bacitracin ointment and cleanse her  "wounds daily. She would like to know why her wounds have returned and how we can cure her. Is questioning why we previously had her in multilayer wraps that were changed weekly when she was recently told she should be changing her dressings daily.      07/18/24: Pt presents for f/u of bilateral lower extremity venous ulcerations. She reports she received her supplies and has been using silver alginate changed every other day with Spandagrips for compression. She has been taking her Keflex as prescribed and reports the redness in her legs has improved from prior visit. Reports her children are concern that her ulcerations returned/were not cured with prior wound care therefore they have made an appointment at [facility] for her for a second opinion regarding management. She denies fevers, malaise. Reports her RLE is tender today therefore she does not wish to have a debridement performed.     09/05/24: Pt presents for f/u of bilateral lower extremity ulcerations. She sought a second opinion at [facility] and triad paste with compression stockings was being utilized however she wished to return here for care. Reports tenderness in the RLE wound. Reports her LLE is now healed without any ulcerations. Unfortunately had a stroke since her prior visit; no obvious residual deficits in terms of weakness but reports her brain is occasionally feeling \"foggy\"; is working to establish a f/u visit with neurology. Reports her license has been taken away.     9/23/2024: HPI per Dr. Santoyo, \"Follow-up of venous stasis ulcer of the right lower extremity.  Last visit was on 9/5/2024.  She states that she was here last week but after waiting 1 hour in the waiting room she left.  She was very upset after her last visit because of the debridement.  She states that after the debridement, she had pain which persists and makes it difficult for her to sleep.  She developed a rash around the wound and noticed some " "odor.  She denies any fever or chills.\"    09/30/24: Pt presents for f/u VSU of the RLE. She reports she has continued with use of Santyl and washing the wound with Vashe solution as instructed at her previous visit. She reports taking her antibiotic as prescribed and denies experiencing any SE from the Levaquin. Today is her last day in her antibiotic course. Has been using Spandagrip for compression. Does not wish to have any debridement performed today.     10/09/24: Patient presents for follow-up of right lower extremity venous ulceration.  She reports that she has been cleansing with Vashe and applying Santyl daily as instructed.    10/16/24: Pt presents for f/u RLE VSU. She has been wearing her Unna boot since previous visit. Reports itching of her entire leg with use of Unna boot. Is upset that the wound is not healing faster or measuring significantly smaller than her previous visit. States that she has been showering with multilayer compression wrap on, reports using a garbage bag and tape secured over the wrap in order to shower.     10/21/24: Pt presents for f/u RLE VSU. Reports she had significant itching and discomfort with use of Coflex lite. She is upset that she has not seen more progress with her wound healing. Reports she would like to return to doing things \"her way\" in terms of wound care. Has gone for formal arterial testing this morning. Reports being on her legs a significant amount this week, she reports having to walk about 2 miles each way to the eye doctor in order to get paperwork saying her license could be reinstated.     11/07/24: Pt presents for f/u RLE VSU. Since her previous visit she was seen by vascular surgery at [facility] on 11/01/24. Per chart review, it was discussed at her visit that the mainstay of treatment is compression therapy and the office recommended multilayer compression wrappings however it was noted Martha did not want to keep the dressing on for a whole " week and she would prefer to continue with local wound care. It was recommended she use an ACE for more compression on her legs and be seen back in 3 months for a standing venous duplex to assess reflux. Since her previous visit she also went for arterial testing which demonstrated ABIs within the normal range. On evaluation today pt reports she does not plan to trial compression therapy and would like to continue cleansing the wound daily and applying Santly.       11/27/24: Patient presents for follow-up of right lower extremity venous ulceration.  Since her previous visit she has been cleansing the ulcer daily and continuing with use of Santyl.  She is still very resistant to the idea of using compression on her leg.  Had arterial testing performed since her previous visit which demonstrates ABIs within the normal range.  She reports she has been going through quite a bit emotionally lately and needs to get things sorted before she can consider treatment with compression wraps.  Does not wish to be referred to a psychologist or psychiatrist, reports feeling worse previously when on medication for depression.  Denies suicidal or homicidal ideation.          The following portions of the patient's history were reviewed and updated as appropriate:   There is no problem list on file for this patient.    Past Medical History:   Diagnosis Date    Breast cancer (HCC)     Hypothyroidism     Hypothyroidism     Kidney stones      Past Surgical History:   Procedure Laterality Date    TUBAL LIGATION       No family history on file.  Social History     Socioeconomic History    Marital status:      Spouse name: Not on file    Number of children: Not on file    Years of education: Not on file    Highest education level: Not on file   Occupational History    Not on file   Tobacco Use    Smoking status: Never    Smokeless tobacco: Never   Vaping Use    Vaping status: Never Used   Substance and Sexual Activity    Alcohol  use: Not Currently    Drug use: Never    Sexual activity: Not on file   Other Topics Concern    Not on file   Social History Narrative    Not on file     Social Drivers of Health     Financial Resource Strain: High Risk (8/27/2024)    Received from [facility]    Overall Financial Resource Strain (CARDIA)     Difficulty of Paying Living Expenses: Very hard   Food Insecurity: Food Insecurity Present (8/27/2024)    Received from [facility]    Hunger Vital Sign     Worried About Running Out of Food in the Last Year: Sometimes true     Ran Out of Food in the Last Year: Sometimes true   Transportation Needs: No Transportation Needs (8/27/2024)    Received from [facility]    PRAPARE - Transportation     Lack of Transportation (Medical): No     Lack of Transportation (Non-Medical): No   Physical Activity: Not on file   Stress: Not on file   Social Connections: Not on file   Intimate Partner Violence: Patient Unable To Answer (8/27/2024)    Received from [facility]    Humiliation, Afraid, Rape, and Kick questionnaire     Fear of Current or Ex-Partner: Patient unable to answer     Emotionally Abused: Patient unable to answer     Physically Abused: Patient unable to answer     Sexually Abused: Patient unable to answer   Housing Stability: Low Risk  (8/27/2024)    Received from [facility]    Housing Stability Vital Sign     Unable to Pay for Housing in the Last Year: No     Number of Times Moved in the Last Year: 0     Homeless in the Last Year: No       Current Outpatient Medications:     acetaminophen (TYLENOL) 650 mg CR tablet, Take 650 mg by mouth every 8 (eight) hours as needed for mild pain, Disp: , Rfl:     anastrozole (ARIMIDEX) 1 mg tablet, Take 1 mg by mouth daily, Disp: , Rfl:     aspirin 81 mg chewable tablet, Chew 81 mg daily, Disp: , Rfl:     atorvastatin (LIPITOR) 40 mg tablet, Take 40 mg by mouth daily, Disp: , Rfl:      benzonatate (TESSALON PERLES) 100 mg capsule, Take 1 capsule (100 mg total) by mouth every 8 (eight) hours (Patient not taking: Reported on 7/11/2024), Disp: 21 capsule, Rfl: 0    clotrimazole (LOTRIMIN) 1 % cream, Apply to affected area 2 times daily for 4 weeks (Patient not taking: Reported on 7/11/2024), Disp: 15 g, Rfl: 0    collagenase (SANTYL) ointment, Apply topically daily To wound of the RLE, Disp: 90 g, Rfl: 1    HYDROcodone-acetaminophen (Norco) 5-325 mg per tablet, Take 1 tablet by mouth every 6 (six) hours as needed for pain Max Daily Amount: 4 tablets (Patient not taking: Reported on 7/11/2024), Disp: 15 tablet, Rfl: 0    ibuprofen (MOTRIN) 800 mg tablet, Take 1 tablet (800 mg total) by mouth every 8 (eight) hours as needed for mild pain (Patient not taking: Reported on 1/12/2022), Disp: 45 tablet, Rfl: 0    levothyroxine 88 mcg tablet, Take 50 mcg by mouth daily, Disp: , Rfl:     methylPREDNISolone 4 MG tablet therapy pack, Use as directed on package (Patient not taking: Reported on 1/12/2022), Disp: 21 tablet, Rfl: 0    Multiple Vitamins-Minerals (CENTRUM SILVER 50+WOMEN PO), Take 1 tablet by mouth daily, Disp: , Rfl:     sertraline (ZOLOFT) 25 mg tablet, Take 25 mg by mouth daily, Disp: , Rfl:     torsemide (DEMADEX) 10 mg tablet, Take 10 mg by mouth daily, Disp: , Rfl:     triamcinolone (KENALOG) 0.1 % ointment, Apply topically 2 (two) times a day for 14 days To rash of RLE. Avoid open wound, Disp: 30 g, Rfl: 1    Verzenio 100 MG TABS, Take 1 tablet by mouth 2 (two) times a day, Disp: , Rfl:     Review of Systems   Constitutional:  Negative for chills and fever.   HENT:  Positive for hearing loss.    Respiratory:  Negative for shortness of breath.    Cardiovascular:  Positive for leg swelling.   Skin:  Positive for wound (RLE).   Psychiatric/Behavioral:  Positive for agitation and dysphoric mood. Negative for suicidal ideas.          Objective:  /72   Pulse 90   Temp (!) 97 °F (36.1 °C)    Resp 18   Pain Score:   7     Physical Exam  Vitals reviewed.   Cardiovascular:      Rate and Rhythm: Normal rate.      Pulses:           Dorsalis pedis pulses are 2+ on the right side and 2+ on the left side.        Posterior tibial pulses are 2+ on the right side and 2+ on the left side.   Pulmonary:      Effort: Pulmonary effort is normal. No respiratory distress.   Musculoskeletal:      Right lower leg: Edema present.      Left lower leg: Edema present.   Skin:     Findings: Wound present. No erythema.             Comments: Large circular medial venous ulceration on the right lower extremity.  Unhealthy appearing granulation tissue present within the wound bed along with adherent slough.  Drainage is small to moderate.  There is no obvious erythema or lymphangitic streaking to suggest acute soft tissue infection     Neurological:      Mental Status: She is alert.   Psychiatric:         Mood and Affect: Mood is depressed. Affect is labile.         Wound 09/05/24 Venous Ulcer Leg Distal;Right (Active)   Wound Image    11/27/24 1046   Wound Description Slough;Yellow;Pink;Gray 11/27/24 1043   Joy-wound Assessment Edema;Rash;Pink;Other (Comment) 11/27/24 1043   Wound Length (cm) 8.5 cm 11/27/24 1043   Wound Width (cm) 10.1 cm 11/27/24 1043   Wound Depth (cm) 0.2 cm 11/27/24 1043   Wound Surface Area (cm^2) 85.85 cm^2 11/27/24 1043   Wound Volume (cm^3) 17.17 cm^3 11/27/24 1043   Calculated Wound Volume (cm^3) 17.17 cm^3 11/27/24 1043   Change in Wound Size % -1617 11/27/24 1043   Drainage Amount Moderate 11/27/24 1043   Drainage Description Serosanguineous 11/27/24 1043   Non-staged Wound Description Full thickness 11/27/24 1043   Treatments Cleansed 11/07/24 1147   Patient Tolerance Tolerated well 11/07/24 1147   Dressing Status Intact;Old drainage 11/27/24 1043                       Wound Instructions:  Orders Placed This Encounter   Procedures    Wound cleansing and dressings Venous Ulcer Distal;Right Leg      "Right Lower leg ulcer     Wash your hands with soap and water.  Remove old dressing, discard into plastic bag and place in trash.    Cleanse the wound with Vasche wound cleanser, rinse well and gently pat dry prior to applying a clean dressing. Do not use tissue or cotton balls.   Do not scrub the wound. Pat dry using gauze.     Shower yes ---do not soak in tub, pool, ocean lake, etc      Apply Santyl to the wound.  Cover with ABD Pad  Secure with yared and tape  Change dressing Daily and as needed for increased drainage, leakage or displacement  Spandagrip F right lower leg       Elastic Tubular Stocking-spandagrip   Tubular elastic bandage: Apply from base of toes to behind the knee. Apply in AM, may remove for sleep.  Avoid prolonged standing in one place.  Elevate leg(s) above the level of the heart when sitting or as much as possible.         *Recommend wearing Spandagrip for compression and to assist with decreasing the swelling.     Please call the Wound Management Center Monday through Friday between 8-430 for any questions.       Monitor for any signs or symptoms of infection such as increase redness or pain, fever/chills, nausea/vomiting, malodor, or  increased drainage, red streaks   If you have any signs or symptoms please call the wound center, if after hours or on holiday/weekend call your primary care provider or go to the emergency department to be evaluated     Follow up in 3 weeks with Dr. Blount at wound care center.     Standing Status:   Future     Expiration Date:   12/4/2024    Wound Procedure Treatment Venous Ulcer Distal;Right Leg     This order was created via procedure documentation       Cally Hernandez, PA-C      Portions of the record may have been created with voice recognition software. Occasional wrong word or \"sound alike\" substitutions may have occurred due to the inherent limitations of voice recognition software. Read the chart carefully and recognize, using context, where " substitutions have occurred.

## 2024-11-27 NOTE — PROGRESS NOTES
Wound Procedure Treatment Venous Ulcer Distal;Right Leg    Performed by: Colleen Obrien RN  Authorized by: Carmen Hernandez PA-C    Associated wounds:   Wound 09/05/24 Venous Ulcer Leg Distal;Right  Wound cleansed with:  NSS  Applied primary dressing:  Polymem foam  Applied secondary dressing:  ABD  Dressing secured with:  Pedro and Tape

## 2024-11-27 NOTE — PATIENT INSTRUCTIONS
Orders Placed This Encounter   Procedures    Wound cleansing and dressings Venous Ulcer Distal;Right Leg     Right Lower leg ulcer     Wash your hands with soap and water.  Remove old dressing, discard into plastic bag and place in trash.    Cleanse the wound with Vasche wound cleanser, rinse well and gently pat dry prior to applying a clean dressing. Do not use tissue or cotton balls.   Do not scrub the wound. Pat dry using gauze.     Shower yes ---do not soak in tub, pool, ocean lake, etc      Apply Santyl to the wound.  Cover with ABD Pad  Secure with yared and tape  Change dressing Daily and as needed for increased drainage, leakage or displacement  Spandagrip F right lower leg       Elastic Tubular Stocking-spandagrip   Tubular elastic bandage: Apply from base of toes to behind the knee. Apply in AM, may remove for sleep.  Avoid prolonged standing in one place.  Elevate leg(s) above the level of the heart when sitting or as much as possible.         *Recommend wearing Spandagrip for compression and to assist with decreasing the swelling.     Please call the Wound Management Center Monday through Friday between 8-430 for any questions.       Monitor for any signs or symptoms of infection such as increase redness or pain, fever/chills, nausea/vomiting, malodor, or  increased drainage, red streaks   If you have any signs or symptoms please call the wound center, if after hours or on holiday/weekend call your primary care provider or go to the emergency department to be evaluated     Follow up in 3 weeks with Dr. Blount at wound care center.     Standing Status:   Future     Expiration Date:   12/4/2024

## 2025-03-07 ENCOUNTER — OFFICE VISIT (OUTPATIENT)
Facility: HOSPITAL | Age: 65
End: 2025-03-07
Payer: COMMERCIAL

## 2025-03-07 VITALS
TEMPERATURE: 98.6 F | BODY MASS INDEX: 34.55 KG/M2 | RESPIRATION RATE: 20 BRPM | HEART RATE: 98 BPM | DIASTOLIC BLOOD PRESSURE: 68 MMHG | WEIGHT: 195 LBS | SYSTOLIC BLOOD PRESSURE: 133 MMHG | HEIGHT: 63 IN

## 2025-03-07 DIAGNOSIS — L24.4 IRRITANT CONTACT DERMATITIS DUE TO DRUG IN CONTACT WITH SKIN: ICD-10-CM

## 2025-03-07 DIAGNOSIS — I83.018 VENOUS STASIS ULCER OF OTHER PART OF RIGHT LOWER LEG WITH FAT LAYER EXPOSED, UNSPECIFIED WHETHER VARICOSE VEINS PRESENT (HCC): Primary | ICD-10-CM

## 2025-03-07 DIAGNOSIS — L97.812 VENOUS STASIS ULCER OF OTHER PART OF RIGHT LOWER LEG WITH FAT LAYER EXPOSED, UNSPECIFIED WHETHER VARICOSE VEINS PRESENT (HCC): Primary | ICD-10-CM

## 2025-03-07 PROCEDURE — 99213 OFFICE O/P EST LOW 20 MIN: CPT | Performed by: STUDENT IN AN ORGANIZED HEALTH CARE EDUCATION/TRAINING PROGRAM

## 2025-03-07 PROCEDURE — 97597 DBRDMT OPN WND 1ST 20 CM/<: CPT | Performed by: STUDENT IN AN ORGANIZED HEALTH CARE EDUCATION/TRAINING PROGRAM

## 2025-03-07 PROCEDURE — 99214 OFFICE O/P EST MOD 30 MIN: CPT | Performed by: STUDENT IN AN ORGANIZED HEALTH CARE EDUCATION/TRAINING PROGRAM

## 2025-03-07 RX ORDER — CEPHALEXIN 500 MG/1
500 CAPSULE ORAL 4 TIMES DAILY
COMMUNITY
Start: 2025-03-03 | End: 2025-03-10

## 2025-03-07 RX ORDER — LEVOTHYROXINE SODIUM 50 UG/1
1 TABLET ORAL DAILY
COMMUNITY
Start: 2025-03-04

## 2025-03-07 RX ORDER — TRIAMCINOLONE ACETONIDE 1 MG/G
OINTMENT TOPICAL 2 TIMES DAILY
Qty: 60 G | Refills: 1 | Status: SHIPPED | OUTPATIENT
Start: 2025-03-07 | End: 2025-03-14

## 2025-03-07 RX ORDER — PANTOPRAZOLE SODIUM 40 MG/1
40 TABLET, DELAYED RELEASE ORAL 2 TIMES DAILY
COMMUNITY
Start: 2024-12-14

## 2025-03-07 RX ORDER — LIDOCAINE HYDROCHLORIDE 40 MG/ML
5 SOLUTION TOPICAL ONCE
Status: COMPLETED | OUTPATIENT
Start: 2025-03-07 | End: 2025-03-07

## 2025-03-07 RX ADMIN — LIDOCAINE HYDROCHLORIDE 5 ML: 40 SOLUTION TOPICAL at 11:40

## 2025-03-07 NOTE — PATIENT INSTRUCTIONS
Orders Placed This Encounter   Procedures    Wound cleansing and dressings Right;Lower Leg     Right leg ulcer     Wash your hands with soap and water.  Remove old dressing, discard into plastic bag and place in trash.  Cleanse the wound with  prior to applying a clean dressing. Do not use tissue or cotton balls. Do not scrub the wound. Pat dry using gauze.    Shower yes - remove dressing, wash leg last with unscented Dove soap, rince well, pat dry with gauze     Apply topical steroid cream  to skin surrounding wound ( prescription at you pharmacy)  Apply Adaptic (oil emulsion non adherent layer) to the right leg wound.  Then apply Calcium Alginate to the wound.   Cover with ABD pads   Secure with kerlex to the lower leg and tape .  May lightly apply Ace Bandage to lower leg  Change dressing once daily     Keep pressure off wound at all times     Elevates legs as much as possible     Follow up in 1 week       STOP antibiotic cream to the leg     Complete  the oral  antibiotics as directed       Please call the Wound Management Center Monday through Friday between 8-430 for any questions.      Monitor for any signs or symptoms of infection such as increase redness or pain, fever/chills, nausea/vomiting, malodor, or  increased drainage. If you have any signs or symptoms please call the wound center, if after hours or on holiday/weekend call your primary care provider or go to the emergency department to be evaluated        Follow up in 1 week Friday 3/14/25     Standing Status:   Future     Expiration Date:   3/14/2025

## 2025-03-07 NOTE — PROGRESS NOTES
Patient ID: Martha Payan is a 65 y.o. female Date of Birth 1960       Chief Complaint   Patient presents with    New Patient Visit     Right leg ulcer, seen in hospital a few days ago in emergency dept and sent home with antibiotic. Patient applying antibiotic ointment to leg and applying telfa pads and cast padding and ace wrap        Allergies:  Sulfamethoxazole-trimethoprim    Diagnosis:   Diagnosis ICD-10-CM Associated Orders   1. Venous stasis ulcer of other part of right lower leg with fat layer exposed, unspecified whether varicose veins present ([facility])  I83.018 Wound cleansing and dressings Right;Lower Leg    L97.812 lidocaine (XYLOCAINE) 4 % topical solution 5 mL     Wound Procedure Treatment Right;Lower Leg     Ambulatory Referral to General Surgery     Debridement Right;Lower Leg      2. Irritant contact dermatitis due to drug in contact with skin  L24.4 triamcinolone (KENALOG) 0.1 % ointment     Wound Procedure Treatment Right;Lower Leg           Assessment  & Plan:    Evaluation of RLE. There is a very large ulceration on the medial lower leg. Ulceration has unhealthy appearing tissue. Thick build-up of slough is present. Drainage is moderate. There are other less extensive scattered areas of skin breakdown present on the lower leg. Skin appears very irritated and erythematous with excoriation. Likely combination of infection, contact dermatitis from topical antibiotic ointment, and an element of mechanical irritation from scratching. Denies fevers, chills.   Complete course of Keflex, as prescribed. Discussed importance of watching for worsening redness, lymphangitic streaking, fevers, chills, malaise and reporting to the ED immediately should these occur. Vitals stable today. No leukocytosis on CBC from 03/03/25.   Topical steroid ointment to lower leg given concern for contact dermatitis. Apply daily x1-2 weeks. Should there be any worsening redness of the skin with use discontinue.    Debridement attempted today, as below. Pt is unable to tolerate debridement in the outpatient setting. Was not able to sufficiently debride the ulcer. Would highly recommend intraoperative debridement with placement of skin graft given extensive size of the ulceration. Referral placed to general surgery to discuss skin grafting. Would benefit from biopsy at the same time to r/o malignant transformation/Marjolin ulcer.   Apply adaptic with overlying alginate to the ulceration. ACE for compression to allow for daily dressing changes and monitoring of erythema of skin. R DAHIANA of 1.04. Elevate legs when resting.   Instructed to monitor for any changes including redness or swelling surrounding the wound, increased drainage or pain as well as fevers or chills.    Obtain 3-4 servings of protein daily for wound healing.    A1C results reviewed with the patient today.5.5 as of two months prior.   F/u in one week. Instructed to call if any questions or concerns arise in meantime.            Subjective:   03/07/25: 66 y/o F with PMHx of DM, breast CA with metastatic disease, hypothyroidism, CAD, presents for evaluation of a chronic wound on her RLE. It was previously recommended that debridement and compression be used however Martha reported she wanted to avoid debridement and compression and wanted to wash the wound daily and use Santyl. She recently was in the ED on 03/03/25 for anemia; concerns were expressed regarding her RLE ulceration while she was in the emergency department given she has not followed with wound care and canceled an appointment she had with vascular surgery. She was adamant that she did not want to be admitted at that time however the ED provider did convince patient to f/u with wound care for further evaluation and started her on Keflex for the red excoriated skin where pt was scratching. On evaluation today, pt reports she has been taking her Keflex as prescribed. She has been applying a topical  antibiotic cream (unsure of which one) to her entire leg along with telfa non stick pads. She reports her RLE is very itchy causing her to scratch at it. She reports being open to the idea of skin grafting at this point given her lack of healing but did express concerns that she is concerned about her cancer spreading to the site of the wound. Denies fevers, chills.           The following portions of the patient's history were reviewed and updated as appropriate:   There is no problem list on file for this patient.    Past Medical History:   Diagnosis Date    Breast cancer (HCC)     Hypothyroidism     Hypothyroidism     Kidney stones      Past Surgical History:   Procedure Laterality Date    TUBAL LIGATION       No family history on file.  Social History     Socioeconomic History    Marital status:      Spouse name: Not on file    Number of children: Not on file    Years of education: Not on file    Highest education level: Not on file   Occupational History    Not on file   Tobacco Use    Smoking status: Never    Smokeless tobacco: Never   Vaping Use    Vaping status: Never Used   Substance and Sexual Activity    Alcohol use: Not Currently    Drug use: Never    Sexual activity: Not on file   Other Topics Concern    Not on file   Social History Narrative    Not on file     Social Drivers of Health     Financial Resource Strain: High Risk (12/10/2024)    Received from [facility]    Overall Financial Resource Strain (CARDIA)     Difficulty of Paying Living Expenses: Very hard   Food Insecurity: Food Insecurity Present (12/10/2024)    Received from [facility]    Hunger Vital Sign     Worried About Running Out of Food in the Last Year: Sometimes true     Ran Out of Food in the Last Year: Sometimes true   Transportation Needs: No Transportation Needs (12/10/2024)    Received from [facility]    PRAPARE - Transportation     Lack of Transportation (Medical): No      Lack of Transportation (Non-Medical): No   Physical Activity: Not on file   Stress: Not on file   Social Connections: Not on file   Intimate Partner Violence: Not At Risk (12/10/2024)    Received from [facility]    Humiliation, Afraid, Rape, and Kick questionnaire     Fear of Current or Ex-Partner: No     Emotionally Abused: No     Physically Abused: No     Sexually Abused: No   Housing Stability: Low Risk  (12/10/2024)    Received from [facility]    Housing Stability Vital Sign     Unable to Pay for Housing in the Last Year: No     Number of Times Moved in the Last Year: 0     Homeless in the Last Year: No       Current Outpatient Medications:     cephalexin (KEFLEX) 500 mg capsule, Take 500 mg by mouth 4 (four) times a day, Disp: , Rfl:     levothyroxine 50 mcg tablet, Take 1 tablet by mouth in the morning, Disp: , Rfl:     pantoprazole (PROTONIX) 40 mg tablet, Take 40 mg by mouth 2 (two) times a day, Disp: , Rfl:     triamcinolone (KENALOG) 0.1 % ointment, Apply topically 2 (two) times a day for 7 days To itching skin of RLE, Disp: 60 g, Rfl: 1    acetaminophen (TYLENOL) 650 mg CR tablet, Take 650 mg by mouth every 8 (eight) hours as needed for mild pain, Disp: , Rfl:     anastrozole (ARIMIDEX) 1 mg tablet, Take 1 mg by mouth daily, Disp: , Rfl:     aspirin 81 mg chewable tablet, Chew 81 mg daily, Disp: , Rfl:     atorvastatin (LIPITOR) 40 mg tablet, Take 40 mg by mouth daily, Disp: , Rfl:     benzonatate (TESSALON PERLES) 100 mg capsule, Take 1 capsule (100 mg total) by mouth every 8 (eight) hours (Patient not taking: Reported on 7/11/2024), Disp: 21 capsule, Rfl: 0    clotrimazole (LOTRIMIN) 1 % cream, Apply to affected area 2 times daily for 4 weeks (Patient not taking: Reported on 7/11/2024), Disp: 15 g, Rfl: 0    collagenase (SANTYL) ointment, Apply topically daily To wound of the RLE, Disp: 90 g, Rfl: 1    HYDROcodone-acetaminophen (Norco) 5-325 mg per tablet, Take 1  "tablet by mouth every 6 (six) hours as needed for pain Max Daily Amount: 4 tablets (Patient not taking: Reported on 7/11/2024), Disp: 15 tablet, Rfl: 0    ibuprofen (MOTRIN) 800 mg tablet, Take 1 tablet (800 mg total) by mouth every 8 (eight) hours as needed for mild pain (Patient not taking: Reported on 1/12/2022), Disp: 45 tablet, Rfl: 0    levothyroxine 88 mcg tablet, Take 50 mcg by mouth daily, Disp: , Rfl:     methylPREDNISolone 4 MG tablet therapy pack, Use as directed on package (Patient not taking: Reported on 1/12/2022), Disp: 21 tablet, Rfl: 0    Multiple Vitamins-Minerals (CENTRUM SILVER 50+WOMEN PO), Take 1 tablet by mouth daily, Disp: , Rfl:     sertraline (ZOLOFT) 25 mg tablet, Take 25 mg by mouth daily, Disp: , Rfl:     torsemide (DEMADEX) 10 mg tablet, Take 10 mg by mouth daily, Disp: , Rfl:     Verzenio 100 MG TABS, Take 1 tablet by mouth 2 (two) times a day, Disp: , Rfl:     Review of Systems      Objective:  /68 (Patient Position: Sitting)   Pulse 98   Temp 98.6 °F (37 °C) (Temporal)   Resp 20   Ht 5' 3\" (1.6 m)   Wt 88.5 kg (195 lb)   BMI 34.54 kg/m²   Pain Score: 10-Worst pain ever     Physical Exam  Vitals reviewed.   Cardiovascular:      Rate and Rhythm: Normal rate.      Pulses:           Dorsalis pedis pulses are 2+ on the right side and 2+ on the left side.        Posterior tibial pulses are 2+ on the right side and 2+ on the left side.   Pulmonary:      Effort: Pulmonary effort is normal. No respiratory distress.   Musculoskeletal:      Right lower leg: Edema present.      Left lower leg: Edema present.   Skin:     Findings: Erythema (RLE and thigh), rash and wound present.             Comments: There is a very large ulceration on the medial lower leg. Ulceration has unhealthy appearing tissue. Thick build-up of slough is present. Drainage is moderate. There are other less extensive scattered areas of skin breakdown present on the lower leg. Skin appears very irritated and " "erythematous with excoriation.    Neurological:      Mental Status: She is alert.   Psychiatric:         Mood and Affect: Mood is depressed. Affect is labile.           Wound 03/07/25 Leg Right;Lower (Active)   Wound Image      03/07/25 1057   Wound Description Epithelialization;Yellow;Pink;Fragile;Edema;Slough;Drainage;Other (Comment) 03/07/25 1103   Joy-wound Assessment Edema;Erythema;Excoriated;Fragile;Other (Comment) 03/07/25 1103   Wound Length (cm) 36 cm 03/07/25 1103   Wound Width (cm) 36.8 cm 03/07/25 1103   Wound Depth (cm) 0.1 cm 03/07/25 1103   Wound Surface Area (cm^2) 1324.8 cm^2 03/07/25 1103   Wound Volume (cm^3) 132.48 cm^3 03/07/25 1103   Calculated Wound Volume (cm^3) 132.48 cm^3 03/07/25 1103   Drainage Amount Large 03/07/25 1103   Drainage Description Serous;Serosanguineous 03/07/25 1103   Non-staged Wound Description Full thickness 03/07/25 1103   Treatments Cleansed 03/07/25 1103   Dressing Status Intact 03/07/25 1103                          Debridement   Wound 03/07/25 Leg Right;Lower    Universal Protocol:  procedure performed by consultantConsent: Verbal consent obtained.  Consent given by: patient  Time out: Immediately prior to procedure a \"time out\" was called to verify the correct patient, procedure, equipment, support staff and site/side marked as required.  Patient understanding: patient states understanding of the procedure being performed  Patient identity confirmed: verbally with patient    Debridement Details  Performed by: PA  Debridement type: selective  Pain control: lidocaine 4%      Post-debridement measurements  Length (cm): 6  Width (cm): 5  Depth (cm): 0.1  Percent debrided: 3%  Surface Area (cm^2): 30  Area Debrided (cm^2): 0.9  Volume (cm^3): 3    Devitalized tissue debrided: slough  Instrument(s) utilized: curette  Bleeding: small  Hemostasis obtained with: pressure  Response to treatment: procedure was not tolerated well  Debridement Comments: Debridement was " discontinued d/t pain and pt request. Of note measurements above relate to largest ulceration that was the only ulcer addressed on the RLE.                  Wound Instructions:  Orders Placed This Encounter   Procedures    Wound cleansing and dressings Right;Lower Leg     Right leg ulcer     Wash your hands with soap and water.  Remove old dressing, discard into plastic bag and place in trash.  Cleanse the wound with  prior to applying a clean dressing. Do not use tissue or cotton balls. Do not scrub the wound. Pat dry using gauze.    Shower yes - remove dressing, wash leg last with unscented Dove soap, rince well, pat dry with gauze     Apply topical steroid cream  to skin surrounding wound ( prescription at you pharmacy)  Apply Adaptic (oil emulsion non adherent layer) to the right leg wound.  Then apply Calcium Alginate to the wound.   Cover with ABD pads   Secure with kerlex to the lower leg and tape .  May lightly apply Ace Bandage to lower leg  Change dressing once daily     Keep pressure off wound at all times     Elevates legs as much as possible     Follow up in 1 week       STOP antibiotic cream to the leg     Complete  the oral  antibiotics as directed       Please call the Wound Management Center Monday through Friday between 6-430 for any questions.      Monitor for any signs or symptoms of infection such as increase redness or pain, fever/chills, nausea/vomiting, malodor, or  increased drainage. If you have any signs or symptoms please call the wound center, if after hours or on holiday/weekend call your primary care provider or go to the emergency department to be evaluated        Follow up in 1 week Friday 3/14/25     Standing Status:   Future     Expiration Date:   3/14/2025    Wound Procedure Treatment Right;Lower Leg     This order was created via procedure documentation    Debridement Right;Lower Leg     This order was created via procedure documentation    Ambulatory Referral to General  "Surgery     Standing Status:   Future     Expiration Date:   3/7/2026     Referral Priority:   Routine     Referral Type:   Consult - AMB     Referral Reason:   Specialty Services Required     Requested Specialty:   General Surgery     Number of Visits Requested:   1     Expiration Date:   3/7/2026       Carmen Hernandez PA-C  Portions of the record may have been created with voice recognition software. Occasional wrong word or \"sound alike\" substitutions may have occurred due to the inherent limitations of voice recognition software. Read the chart carefully and recognize, using context, where substitutions have occurred.    "

## 2025-03-07 NOTE — PROGRESS NOTES
Wound Procedure Treatment Right;Lower Leg    Performed by: Jen Chin RN  Authorized by: Carmen Hernandez PA-C  Associated wounds:   Wound 03/07/25 Leg Right;Lower    Wound cleansed with:  Soap and water and NSS   Applied primary dressing:  Non adherent contact layer and Calcium alginate   Applied secondary dressing:  ABD   Dressing secured with:  Kerlix and Tape   Comments:  Ace wrap

## 2025-03-11 ENCOUNTER — TELEPHONE (OUTPATIENT)
Age: 65
End: 2025-03-11

## 2025-03-11 NOTE — TELEPHONE ENCOUNTER
Patient calling with referral from wound care for lower leg wound.  Unable to debride in office so referred for possible debridement and skin graft.    Patient is currently on Keflex and is going to be seen again by wound care on 3/14.    Was able to make appointment with Dr Xiao for 3/21.    Patient will return call if any changes are needed after her visit with wound care on Friday.  #[phone]

## 2025-03-14 ENCOUNTER — OFFICE VISIT (OUTPATIENT)
Facility: HOSPITAL | Age: 65
End: 2025-03-14
Payer: MEDICARE

## 2025-03-14 VITALS
SYSTOLIC BLOOD PRESSURE: 146 MMHG | HEART RATE: 98 BPM | TEMPERATURE: 97.4 F | RESPIRATION RATE: 18 BRPM | DIASTOLIC BLOOD PRESSURE: 67 MMHG

## 2025-03-14 DIAGNOSIS — L97.812 VENOUS STASIS ULCER OF OTHER PART OF RIGHT LOWER LEG WITH FAT LAYER EXPOSED, UNSPECIFIED WHETHER VARICOSE VEINS PRESENT (HCC): Primary | ICD-10-CM

## 2025-03-14 DIAGNOSIS — E11.69 TYPE 2 DIABETES MELLITUS WITH OTHER SPECIFIED COMPLICATION, WITHOUT LONG-TERM CURRENT USE OF INSULIN (HCC): ICD-10-CM

## 2025-03-14 DIAGNOSIS — I83.018 VENOUS STASIS ULCER OF OTHER PART OF RIGHT LOWER LEG WITH FAT LAYER EXPOSED, UNSPECIFIED WHETHER VARICOSE VEINS PRESENT (HCC): Primary | ICD-10-CM

## 2025-03-14 DIAGNOSIS — L24.4 IRRITANT CONTACT DERMATITIS DUE TO DRUG IN CONTACT WITH SKIN: ICD-10-CM

## 2025-03-14 PROCEDURE — 99214 OFFICE O/P EST MOD 30 MIN: CPT

## 2025-03-14 PROCEDURE — 99213 OFFICE O/P EST LOW 20 MIN: CPT

## 2025-03-14 RX ORDER — MULTIVIT WITH MINERALS/LUTEIN
250 TABLET ORAL DAILY
COMMUNITY

## 2025-03-14 RX ORDER — CEPHALEXIN 500 MG/1
500 CAPSULE ORAL EVERY 6 HOURS SCHEDULED
Qty: 28 CAPSULE | Refills: 0 | Status: SHIPPED | OUTPATIENT
Start: 2025-03-14 | End: 2025-03-21

## 2025-03-14 RX ORDER — LANOLIN ALCOHOL/MO/W.PET/CERES
1000 CREAM (GRAM) TOPICAL DAILY
COMMUNITY

## 2025-03-14 NOTE — PATIENT INSTRUCTIONS
Orders Placed This Encounter   Procedures    Wound cleansing and dressings Right;Lower Leg     Right leg ulcer      Wash your hands with soap and water.  Remove old dressing, discard into plastic bag and place in trash.  Cleanse the wound with  prior to applying a clean dressing. Do not use tissue or cotton balls. Do not scrub the wound. Pat dry using gauze.     Shower yes - remove dressing, wash leg last with unscented Dove soap, rince well, pat dry with gauze      Apply topical steroid cream  to skin surrounding wound ( prescription at you pharmacy)  Apply Adaptic (oil emulsion non adherent layer) to the right leg wound.  Then apply Calcium Alginate to the wound.   Cover with ABD pads   Secure with kerlex to the lower leg and tape .  May lightly apply Ace Bandage to lower leg  Change dressing once daily        Keep pressure off wound at all times      Elevates legs as much as possible      Follow up in 1 week         STOP antibiotic cream to the leg      Another prescription for Keflex was prescribed to you today. Please pick this up at the pharmacy.         Please call the Wound Management Center Monday through Friday between 8-430 for any questions.       Monitor for any signs or symptoms of infection such as increase redness or pain, fever/chills, nausea/vomiting, malodor, or  increased drainage. If you have any signs or symptoms please call the wound center, if after hours or on holiday/weekend call your primary care provider or go to the emergency department to be evaluated          Follow up in 1 week Friday at the wound care center.     Standing Status:   Future     Expiration Date:   3/21/2025

## 2025-03-14 NOTE — PROGRESS NOTES
Wound Procedure Treatment Right;Lower Leg    Performed by: Colleen Obrien RN  Authorized by: RIANNA Cortez  Associated wounds:   Wound 03/07/25 Leg Right;Lower    Wound cleansed with:  Soap and water   Applied primary dressing:  Non adherent contact layer and Calcium alginate   Applied secondary dressing:  ABD   Dressing secured with:  Kerlix, Tape and Compression wrap   ACE

## 2025-03-14 NOTE — PROGRESS NOTES
Patient ID: Martha Payan is a 65 y.o. female Date of Birth 1960       Chief Complaint   Patient presents with    Follow Up Wound Care Visit     Venous ulcer on right lower leg       Allergies:  Sulfamethoxazole-trimethoprim    Diagnosis:      Diagnosis ICD-10-CM Associated Orders   1. Venous stasis ulcer of other part of right lower leg with fat layer exposed, unspecified whether varicose veins present ([facility])  I83.018 Wound cleansing and dressings Right;Lower Leg    L97.812 cephalexin (KEFLEX) 500 mg capsule     Wound Procedure Treatment Right;Lower Leg      2. Irritant contact dermatitis due to drug in contact with skin  L24.4 Wound cleansing and dressings Right;Lower Leg     Wound Procedure Treatment Right;Lower Leg      3. Type 2 diabetes mellitus with other specified complication, without long-term current use of insulin ([facility])  E11.69               Assessment & Plan:  Right lower extremity venous ulcers. Patient declined debridement of wound in office. Adamantly demanding additional antibiotic.  Discussed with patient this will not help with wound healing as she does not appear to have any clinical s/s of infection on exam, however she continued to demand more Keflex. New prescription sent to patient's pharmacy. Recommend to continue with current wound management. Advised her to keep her upcoming general surgery appointment to discuss OR debridement and biopsy of wound.   See wound orders below  Wound is not showing any clinical s/s of infection  Elevate lower extremities and avoid prolonged standing/keeping legs in dependent position for edema management.   Counseled patient on the importance of tight glycemic control and adequate protein intake (3-4 servings per day) to promote wound healing.   A1C results reviewed with the patient today.   Follow-up in 1 week(s). Call sooner with questions or concerns or any signs of infection such as redness, swelling, increased/purulent drainage, fever or chills, and  increased pain.  Patient verbalized understanding and agrees with plan of care.          Subjective:   3/14/25: Patient presents to the wound care center for follow-up visit of right lower extremity wound.  Patient has been using Adaptic to the wound.  Patient offers no wound related complaints, denies increased pain or drainage.  Patient states that she feels her wound greatly improved with being on Keflex which she recently completed.  Patient is inquiring about further antibiotics today. No fever or chills. Reports she has a general surgery appointment scheduled for next week.         The following portions of the patient's history were reviewed and updated as appropriate:   There is no problem list on file for this patient.    Past Medical History:   Diagnosis Date    Breast cancer (HCC)     Hypothyroidism     Hypothyroidism     Kidney stones      Past Surgical History:   Procedure Laterality Date    TUBAL LIGATION       No family history on file.   Social History     Socioeconomic History    Marital status:      Spouse name: Not on file    Number of children: Not on file    Years of education: Not on file    Highest education level: Not on file   Occupational History    Not on file   Tobacco Use    Smoking status: Never    Smokeless tobacco: Never   Vaping Use    Vaping status: Never Used   Substance and Sexual Activity    Alcohol use: Not Currently    Drug use: Never    Sexual activity: Not on file   Other Topics Concern    Not on file   Social History Narrative    Not on file     Social Drivers of Health     Financial Resource Strain: High Risk (12/10/2024)    Received from [facility]    Overall Financial Resource Strain (CARDIA)     Difficulty of Paying Living Expenses: Very hard   Food Insecurity: Food Insecurity Present (12/10/2024)    Received from [facility]    Hunger Vital Sign     Worried About Running Out of Food in the Last Year: Sometimes true     Ran Out  of Food in the Last Year: Sometimes true   Transportation Needs: No Transportation Needs (12/10/2024)    Received from [facility]    PRAPARE - Transportation     Lack of Transportation (Medical): No     Lack of Transportation (Non-Medical): No   Physical Activity: Not on file   Stress: Not on file   Social Connections: Not on file   Intimate Partner Violence: Not At Risk (12/10/2024)    Received from [facility]    Humiliation, Afraid, Rape, and Kick questionnaire     Fear of Current or Ex-Partner: No     Emotionally Abused: No     Physically Abused: No     Sexually Abused: No   Housing Stability: Low Risk  (12/10/2024)    Received from [facility]    Housing Stability Vital Sign     Unable to Pay for Housing in the Last Year: No     Number of Times Moved in the Last Year: 0     Homeless in the Last Year: No        Current Outpatient Medications:     acetaminophen (TYLENOL) 650 mg CR tablet, Take 650 mg by mouth every 8 (eight) hours as needed for mild pain, Disp: , Rfl:     anastrozole (ARIMIDEX) 1 mg tablet, Take 1 mg by mouth daily, Disp: , Rfl:     ascorbic acid (VITAMIN C) 250 mg tablet, Take 250 mg by mouth daily, Disp: , Rfl:     aspirin 81 mg chewable tablet, Chew 81 mg daily, Disp: , Rfl:     atorvastatin (LIPITOR) 40 mg tablet, Take 40 mg by mouth daily, Disp: , Rfl:     cephalexin (KEFLEX) 500 mg capsule, Take 1 capsule (500 mg total) by mouth every 6 (six) hours for 7 days, Disp: 28 capsule, Rfl: 0    Cholecalciferol (VITAMIN D-3 PO), Take by mouth Per patient, she takes 50mg daily., Disp: , Rfl:     levothyroxine 50 mcg tablet, Take 1 tablet by mouth in the morning, Disp: , Rfl:     Multiple Vitamins-Minerals (CENTRUM SILVER 50+WOMEN PO), Take 1 tablet by mouth daily, Disp: , Rfl:     pantoprazole (PROTONIX) 40 mg tablet, Take 40 mg by mouth 2 (two) times a day, Disp: , Rfl:     Verzenio 100 MG TABS, Take 1 tablet by mouth 2 (two) times a day, Disp: ,  Rfl:     vitamin B-12 (VITAMIN B-12) 1,000 mcg tablet, Take 1,000 mcg by mouth daily, Disp: , Rfl:     benzonatate (TESSALON PERLES) 100 mg capsule, Take 1 capsule (100 mg total) by mouth every 8 (eight) hours (Patient not taking: Reported on 3/14/2025), Disp: 21 capsule, Rfl: 0    clotrimazole (LOTRIMIN) 1 % cream, Apply to affected area 2 times daily for 4 weeks (Patient not taking: Reported on 3/14/2025), Disp: 15 g, Rfl: 0    collagenase (SANTYL) ointment, Apply topically daily To wound of the RLE, Disp: 90 g, Rfl: 1    HYDROcodone-acetaminophen (Norco) 5-325 mg per tablet, Take 1 tablet by mouth every 6 (six) hours as needed for pain Max Daily Amount: 4 tablets (Patient not taking: Reported on 3/14/2025), Disp: 15 tablet, Rfl: 0    ibuprofen (MOTRIN) 800 mg tablet, Take 1 tablet (800 mg total) by mouth every 8 (eight) hours as needed for mild pain (Patient not taking: Reported on 3/14/2025), Disp: 45 tablet, Rfl: 0    levothyroxine 88 mcg tablet, Take 50 mcg by mouth daily (Patient not taking: Reported on 3/14/2025), Disp: , Rfl:     methylPREDNISolone 4 MG tablet therapy pack, Use as directed on package (Patient not taking: Reported on 3/14/2025), Disp: 21 tablet, Rfl: 0    sertraline (ZOLOFT) 25 mg tablet, Take 25 mg by mouth daily (Patient not taking: Reported on 3/14/2025), Disp: , Rfl:     torsemide (DEMADEX) 10 mg tablet, Take 10 mg by mouth daily, Disp: , Rfl:     triamcinolone (KENALOG) 0.1 % ointment, Apply topically 2 (two) times a day for 7 days To itching skin of RLE (Patient not taking: Reported on 3/14/2025), Disp: 60 g, Rfl: 1    Review of Systems   Constitutional:  Negative for chills and fever.   HENT:  Negative for congestion and sneezing.    Respiratory:  Negative for cough and shortness of breath.    Cardiovascular:  Positive for leg swelling.   Skin:  Positive for wound.   Psychiatric/Behavioral:  Negative for agitation.        Objective:  /67   Pulse 98   Temp (!) 97.4 °F (36.3 °C)    Resp 18   Pain Score: 0-No pain     Physical Exam  Constitutional:       General: She is awake. She is not in acute distress.     Appearance: She is obese. She is not ill-appearing, toxic-appearing or diaphoretic.   HENT:      Head: Normocephalic and atraumatic.      Right Ear: External ear normal.      Left Ear: External ear normal.   Eyes:      Conjunctiva/sclera: Conjunctivae normal.   Pulmonary:      Effort: Pulmonary effort is normal. No respiratory distress.   Musculoskeletal:      Right lower leg: Edema present.   Skin:     General: Skin is warm and dry.      Findings: Wound present.      Comments: 1. Right lower extremity venous ulcers. No purulent drainage or malodor. No erythema, warmth or lymphangitic streaking to suggest cellulitis.  Refer to wound assessment for additional details.   Neurological:      Mental Status: She is alert.         Wound 03/07/25 Leg Right;Lower (Active)   Wound Image     03/14/25 1134   Wound Description Epithelialization;Yellow;Pink;Fragile;Edema;Slough 03/14/25 1128   Non-staged Wound Description Full thickness 03/14/25 1128   Wound Length (cm) 16 cm 03/14/25 1128   Wound Width (cm) 15.8 cm 03/14/25 1128   Wound Depth (cm) 0.1 cm 03/14/25 1128   Wound Surface Area (cm^2) 252.8 cm^2 03/14/25 1128   Wound Volume (cm^3) 25.28 cm^3 03/14/25 1128   Calculated Wound Volume (cm^3) 25.28 cm^3 03/14/25 1128   Change in Wound Size % 80.92 03/14/25 1128   Drainage Amount Large 03/14/25 1128   Drainage Description Serous;Serosanguineous 03/14/25 1128   Joy-wound Assessment Edema;Erythema;Excoriated;Fragile;Other (Comment) 03/14/25 1128   Treatments Cleansed 03/14/25 1128   Dressing Status Intact;Old drainage 03/14/25 1128             Lab Results   Component Value Date    HGBA1C 5.5 12/10/2024       Wound Instructions:  Orders Placed This Encounter   Procedures    Wound cleansing and dressings Right;Lower Leg     Right leg ulcer      Wash your hands with soap and water.  Remove old  "dressing, discard into plastic bag and place in trash.  Cleanse the wound with  prior to applying a clean dressing. Do not use tissue or cotton balls. Do not scrub the wound. Pat dry using gauze.     Shower yes - remove dressing, wash leg last with unscented Dove soap, rince well, pat dry with gauze      Apply topical steroid cream  to skin surrounding wound ( prescription at you pharmacy)  Apply Adaptic (oil emulsion non adherent layer) to the right leg wound.  Then apply Calcium Alginate to the wound.   Cover with ABD pads   Secure with kerlex to the lower leg and tape .  May lightly apply Ace Bandage to lower leg  Change dressing once daily        Keep pressure off wound at all times      Elevates legs as much as possible      Follow up in 1 week         STOP antibiotic cream to the leg      Another prescription for Keflex was prescribed to you today. Please pick this up at the pharmacy.         Please call the Wound Management Center Monday through Friday between [phone] for any questions.       Monitor for any signs or symptoms of infection such as increase redness or pain, fever/chills, nausea/vomiting, malodor, or  increased drainage. If you have any signs or symptoms please call the wound center, if after hours or on holiday/weekend call your primary care provider or go to the emergency department to be evaluated          Follow up in 1 week Friday at the wound care center.     Standing Status:   Future     Expiration Date:   3/21/2025    Wound Procedure Treatment Right;Lower Leg     This order was created via procedure documentation           RIANNA Mckeon, JESSICA, DONG    Portions of the record may have been created with voice recognition software. Occasional wrong word or \"sound alike\" substitutions may have occurred due to the inherent limitations of voice recognition software. Read the chart carefully and recognize, using context, where substitutions have occurred.          Total time " spent today:    Total time (face-to-face and non-face-to-face) spent on today's visit was 12 minutes. This includes preparation for the visits (i.e. reviewing test results from date recent hospitalizations, ER/Urgent Care/primary care visits and recent consultant office visits), performance of a medically appropriate history and examination, and orders for medications or testing.

## 2025-03-20 NOTE — PROGRESS NOTES
Name: Martha Payan      : 1960      MRN: [id number]  Encounter Provider: Uday Xiao MD  Encounter Date: 3/21/2025   Encounter department: [facility] GENERAL SURGERY [place]  :  Assessment & Plan        History of Present Illness {?Quick Links Encounters * My Last Note * Last Note in Specialty * Snapshot * Since Last Visit * History :95343}  HPI  Martha Payan is a 65 y.o. female who presents with a right lower leg open wound  {History obtained from(Optional):76575}    Review of Systems  {Select to insert medical history sections (Optional):79435}     Objective {?Quick Links Trend Vitals * Enter New Vitals * Results Review * Timeline (Adult) * Labs * Imaging * Cardiology * Procedures * Lung Cancer Screening * Surgical eConsent :63336}  There were no vitals taken for this visit.     Physical Exam    {Administrative / Billing Section (Optional):99134}

## 2025-03-21 ENCOUNTER — CONSULT (OUTPATIENT)
Dept: SURGERY | Facility: CLINIC | Age: 65
End: 2025-03-21
Payer: MEDICARE

## 2025-03-21 VITALS
WEIGHT: 199 LBS | BODY MASS INDEX: 35.26 KG/M2 | OXYGEN SATURATION: 99 % | HEART RATE: 109 BPM | HEIGHT: 63 IN | TEMPERATURE: 97.3 F

## 2025-03-21 DIAGNOSIS — L97.812 VENOUS STASIS ULCER OF OTHER PART OF RIGHT LOWER LEG WITH FAT LAYER EXPOSED, UNSPECIFIED WHETHER VARICOSE VEINS PRESENT (HCC): ICD-10-CM

## 2025-03-21 DIAGNOSIS — L97.909 CHRONIC CUTANEOUS VENOUS STASIS ULCER (HCC): Primary | ICD-10-CM

## 2025-03-21 DIAGNOSIS — I83.009 CHRONIC CUTANEOUS VENOUS STASIS ULCER (HCC): Primary | ICD-10-CM

## 2025-03-21 DIAGNOSIS — I83.018 VENOUS STASIS ULCER OF OTHER PART OF RIGHT LOWER LEG WITH FAT LAYER EXPOSED, UNSPECIFIED WHETHER VARICOSE VEINS PRESENT (HCC): ICD-10-CM

## 2025-03-21 PROCEDURE — 99203 OFFICE O/P NEW LOW 30 MIN: CPT | Performed by: SURGERY

## 2025-03-21 RX ORDER — CIPROFLOXACIN 500 MG/1
500 TABLET, FILM COATED ORAL 2 TIMES DAILY
COMMUNITY

## 2025-03-21 NOTE — PROGRESS NOTES
Name: Martha Payan      : 1960      MRN: [id number]  Encounter Provider: Uday Xiao MD  Encounter Date: 3/21/2025   Encounter department: [facility] GENERAL SURGERY [place]  :  Assessment & Plan  Venous stasis ulcer of other part of right lower leg with fat layer exposed, unspecified whether varicose veins present (HCC)    Orders:    Ambulatory Referral to General Surgery    Chronic cutaneous venous stasis ulcer (HCC)  The patient is a pleasant 65-year-old female with a complex past medical history significant for stage IV breast carcinoma presenting with a right lower extremity chronic venous stasis ulcer for general surgery consultation.    After thorough history, physical examination and review the computer record the patient appears quite well and stable with regards to her 6 x 6 cm chronic venous stasis ulcer of the medial malleolus of the right ankle.    The wound was redressed in the office today with nonadherent dressing topical antibiotics Curlex and an Ace wrap.    I look forward to seeing the patient back in a month's time.             History of Present Illness   HPI  Martha Payan is a 65 y.o. female who presents with a right lower leg wound that she has been dealing with for a a year and half after injuring her leg on a wood. Pt states the wound has yellow drainage but no foul smell and she suffers from pain. Pt was on Cipro and then Cephalexin but she is now on another round of Cephalexin. Pt denies any side effects with taking these abx. Pt is currently fighting breast cancer and right hip cancer. EKTA Mariscal  History obtained from: patient    Review of Systems   Constitutional:  Negative for chills and fever.   HENT:  Negative for ear pain and sore throat.    Eyes:  Negative for pain and visual disturbance.   Respiratory:  Negative for cough and shortness of breath.    Cardiovascular:  Negative for chest pain and palpitations.   Gastrointestinal:  Negative for abdominal pain and  vomiting.   Genitourinary:  Negative for dysuria and hematuria.   Musculoskeletal:  Negative for arthralgias and back pain.   Skin:  Negative for color change and rash.   Neurological:  Negative for seizures and syncope.   All other systems reviewed and are negative.    Pertinent Medical History            Medical History Reviewed by provider this encounter:     .  Past Medical History   Past Medical History:   Diagnosis Date    Breast cancer (HCC)     Hypothyroidism     Hypothyroidism     Kidney stones      Past Surgical History:   Procedure Laterality Date    TUBAL LIGATION       History reviewed. No pertinent family history.   reports that she has never smoked. She has never used smokeless tobacco. She reports that she does not currently use alcohol. She reports that she does not use drugs.  Current Outpatient Medications   Medication Instructions    acetaminophen (TYLENOL) 650 mg, Every 8 hours PRN    anastrozole (ARIMIDEX) 1 mg, Daily    ascorbic acid (VITAMIN C) 250 mg, Daily    aspirin 81 mg, Daily    atorvastatin (LIPITOR) 40 mg, Daily    benzonatate (TESSALON PERLES) 100 mg, Oral, Every 8 hours    cephalexin (KEFLEX) 500 mg, Oral, Every 6 hours scheduled    Cholecalciferol (VITAMIN D-3 PO) Take by mouth Per patient, she takes 50mg daily.    ciprofloxacin (CIPRO) 500 mg, 2 times daily    clotrimazole (LOTRIMIN) 1 % cream Apply to affected area 2 times daily for 4 weeks    collagenase (SANTYL) ointment Topical, Daily, To wound of the RLE    HYDROcodone-acetaminophen (Norco) 5-325 mg per tablet 1 tablet, Oral, Every 6 hours PRN    ibuprofen (MOTRIN) 800 mg, Oral, Every 8 hours PRN    levothyroxine 50 mcg tablet 1 tablet, Daily    levothyroxine 50 mcg, Daily    methylPREDNISolone 4 MG tablet therapy pack Use as directed on package    Multiple Vitamins-Minerals (CENTRUM SILVER 50+WOMEN PO) 1 tablet, Daily    pantoprazole (PROTONIX) 40 mg, 2 times daily    sertraline (ZOLOFT) 25 mg, Daily    torsemide (DEMADEX)  10 mg, Oral, Daily    triamcinolone (KENALOG) 0.1 % ointment Topical, 2 times daily, To itching skin of RLE    Verzenio 100 MG TABS 1 tablet, 2 times daily    vitamin B-12 (VITAMIN B-12) 1,000 mcg, Daily     Allergies   Allergen Reactions    Amoxicillin-Pot Clavulanate Other (See Comments)     Other Reaction(s): rash    Clindamycin Other (See Comments)     Chest tightness    Iodinated Contrast Media Other (See Comments)    Sulfa Antibiotics Other (See Comments)    Sulfamethoxazole-Trimethoprim Hives      Current Outpatient Medications on File Prior to Visit   Medication Sig Dispense Refill    anastrozole (ARIMIDEX) 1 mg tablet Take 1 mg by mouth daily      ascorbic acid (VITAMIN C) 250 mg tablet Take 250 mg by mouth daily      aspirin 81 mg chewable tablet Chew 81 mg daily      atorvastatin (LIPITOR) 40 mg tablet Take 40 mg by mouth daily      cephalexin (KEFLEX) 500 mg capsule Take 1 capsule (500 mg total) by mouth every 6 (six) hours for 7 days 28 capsule 0    Cholecalciferol (VITAMIN D-3 PO) Take by mouth Per patient, she takes 50mg daily.      ciprofloxacin (CIPRO) 500 mg tablet Take 500 mg by mouth 2 (two) times a day      levothyroxine 50 mcg tablet Take 1 tablet by mouth in the morning      pantoprazole (PROTONIX) 40 mg tablet Take 40 mg by mouth 2 (two) times a day      Verzenio 100 MG TABS Take 1 tablet by mouth 2 (two) times a day      vitamin B-12 (VITAMIN B-12) 1,000 mcg tablet Take 1,000 mcg by mouth daily      acetaminophen (TYLENOL) 650 mg CR tablet Take 650 mg by mouth every 8 (eight) hours as needed for mild pain (Patient not taking: Reported on 3/21/2025)      benzonatate (TESSALON PERLES) 100 mg capsule Take 1 capsule (100 mg total) by mouth every 8 (eight) hours (Patient not taking: Reported on 7/11/2024) 21 capsule 0    clotrimazole (LOTRIMIN) 1 % cream Apply to affected area 2 times daily for 4 weeks (Patient not taking: Reported on 7/11/2024) 15 g 0    collagenase (SANTYL) ointment Apply  "topically daily To wound of the RLE 90 g 1    HYDROcodone-acetaminophen (Norco) 5-325 mg per tablet Take 1 tablet by mouth every 6 (six) hours as needed for pain Max Daily Amount: 4 tablets (Patient not taking: Reported on 7/11/2024) 15 tablet 0    ibuprofen (MOTRIN) 800 mg tablet Take 1 tablet (800 mg total) by mouth every 8 (eight) hours as needed for mild pain (Patient not taking: Reported on 1/12/2022) 45 tablet 0    levothyroxine 88 mcg tablet Take 50 mcg by mouth daily (Patient not taking: Reported on 3/14/2025)      methylPREDNISolone 4 MG tablet therapy pack Use as directed on package (Patient not taking: Reported on 1/12/2022) 21 tablet 0    Multiple Vitamins-Minerals (CENTRUM SILVER 50+WOMEN PO) Take 1 tablet by mouth daily (Patient not taking: Reported on 3/21/2025)      sertraline (ZOLOFT) 25 mg tablet Take 25 mg by mouth daily (Patient not taking: Reported on 3/21/2025)      torsemide (DEMADEX) 10 mg tablet Take 10 mg by mouth daily      triamcinolone (KENALOG) 0.1 % ointment Apply topically 2 (two) times a day for 7 days To itching skin of RLE (Patient not taking: Reported on 3/14/2025) 60 g 1     No current facility-administered medications on file prior to visit.      Social History     Tobacco Use    Smoking status: Never    Smokeless tobacco: Never   Vaping Use    Vaping status: Never Used   Substance and Sexual Activity    Alcohol use: Not Currently    Drug use: Never    Sexual activity: Not on file        Objective   Pulse (!) 109   Temp (!) 97.3 °F (36.3 °C) (Temporal)   Ht 5' 3\" (1.6 m)   Wt 90.3 kg (199 lb)   SpO2 99%   BMI 35.25 kg/m²      Physical Exam  Vitals and nursing note reviewed.   Constitutional:       General: She is not in acute distress.     Appearance: She is well-developed.   HENT:      Head: Normocephalic and atraumatic.   Eyes:      Conjunctiva/sclera: Conjunctivae normal.   Cardiovascular:      Rate and Rhythm: Normal rate and regular rhythm.      Heart sounds: No murmur " heard.  Pulmonary:      Effort: Pulmonary effort is normal. No respiratory distress.      Breath sounds: Normal breath sounds.   Abdominal:      Palpations: Abdomen is soft.      Tenderness: There is no abdominal tenderness.   Musculoskeletal:         General: No swelling.      Cervical back: Neck supple.   Skin:     General: Skin is warm and dry.      Capillary Refill: Capillary refill takes less than 2 seconds.      Comments: 6 x 6 cm chronic venous stasis ulcer of the medial malleolus of the right ankle.  With healthy bed of granulation tissue and no evidence of active soft tissue infection.  No fibrinous debris requiring debridement in the office today.   Neurological:      Mental Status: She is alert.   Psychiatric:         Mood and Affect: Mood normal.         Administrative Statements   I have spent a total time of 15 minutes in caring for this patient on the day of the visit/encounter including Prognosis, Instructions for management, and Impressions.

## 2025-03-21 NOTE — ASSESSMENT & PLAN NOTE
The patient is a pleasant 65-year-old female with a complex past medical history significant for stage IV breast carcinoma presenting with a right lower extremity chronic venous stasis ulcer for general surgery consultation.    After thorough history, physical examination and review the computer record the patient appears quite well and stable with regards to her 6 x 6 cm chronic venous stasis ulcer of the medial malleolus of the right ankle.    The wound was redressed in the office today with nonadherent dressing topical antibiotics Curlex and an Ace wrap.    I look forward to seeing the patient back in a month's time.

## 2025-03-24 ENCOUNTER — OFFICE VISIT (OUTPATIENT)
Facility: HOSPITAL | Age: 65
End: 2025-03-24
Payer: MEDICARE

## 2025-03-24 VITALS
HEART RATE: 101 BPM | SYSTOLIC BLOOD PRESSURE: 156 MMHG | TEMPERATURE: 97.5 F | DIASTOLIC BLOOD PRESSURE: 70 MMHG | RESPIRATION RATE: 18 BRPM

## 2025-03-24 DIAGNOSIS — L97.812 VENOUS STASIS ULCER OF OTHER PART OF RIGHT LOWER LEG WITH FAT LAYER EXPOSED, UNSPECIFIED WHETHER VARICOSE VEINS PRESENT (HCC): Primary | ICD-10-CM

## 2025-03-24 DIAGNOSIS — I83.018 VENOUS STASIS ULCER OF OTHER PART OF RIGHT LOWER LEG WITH FAT LAYER EXPOSED, UNSPECIFIED WHETHER VARICOSE VEINS PRESENT (HCC): Primary | ICD-10-CM

## 2025-03-24 DIAGNOSIS — Z91.199 NON COMPLIANCE WITH MEDICAL TREATMENT: ICD-10-CM

## 2025-03-24 DIAGNOSIS — E11.69 TYPE 2 DIABETES MELLITUS WITH OTHER SPECIFIED COMPLICATION, WITHOUT LONG-TERM CURRENT USE OF INSULIN (HCC): ICD-10-CM

## 2025-03-24 PROCEDURE — 99214 OFFICE O/P EST MOD 30 MIN: CPT | Performed by: STUDENT IN AN ORGANIZED HEALTH CARE EDUCATION/TRAINING PROGRAM

## 2025-03-24 PROCEDURE — 99213 OFFICE O/P EST LOW 20 MIN: CPT | Performed by: STUDENT IN AN ORGANIZED HEALTH CARE EDUCATION/TRAINING PROGRAM

## 2025-03-24 NOTE — PROGRESS NOTES
Patient ID: Martha Payan is a 65 y.o. female Date of Birth 1960       Chief Complaint   Patient presents with    Follow Up Wound Care Visit     Right lower leg venous ulcer       Allergies:  Amoxicillin-pot clavulanate, Clindamycin, Iodinated contrast media, Sulfa antibiotics, and Sulfamethoxazole-trimethoprim    Diagnosis:   Diagnosis ICD-10-CM Associated Orders   1. Venous stasis ulcer of other part of right lower leg with fat layer exposed, unspecified whether varicose veins present ([facility])  I83.018 Wound cleansing and dressings Right;Lower Leg    L97.812 Wound Procedure Treatment Right;Lower Leg      2. Type 2 diabetes mellitus with other specified complication, without long-term current use of insulin ([facility])  E11.69       3. Non compliance with medical treatment  Z91.199            Assessment  & Plan:    F/u VSU of the R medial lower leg. Continues to have a large ulceration present. Ulceration with 100% exudate and slough. No granulation tissue visible. Drainage is moderate. Periwound erythema and diffuse erythema has significantly improved and is now resolved with use of topical steroids.   Pt refuses debridement today.   Pt refuses biopsy be taken today.   Continue with adaptic, alginate and ABD pad. Change every 2-3 days. Discussed cutting dressings to size to conserve supplies. Will place new supply order when eligible.   Discussed nature of her ulceration and importance of formal compression give multilayer compression wraps are considered the standard of care to heal venous ulcerations. Pt was hesitant initially but allowed Unna boot to be placed. R DAHIANA of 1.04 without arterial occlusive disease as of 10/21/24. Immediately following placement, pt was complaining of pain that was intolerable at the site of the ulcer therefore the decision was made to remove the Unna boot and have pt continue with ACE given she was unable to tolerate multilayer compression. Discussed that our goals of care will be  "adjusted and the ulceration will be considered palliative care if I am unable to perform the standards of care to assist with healing including debridements, biopsy which I feel would be beneficial to r/o malignant transformation given the chronicity and lack of improvement, and multilayer compression.   D/c use of topical steroids to avoid adverse SE from long term use given stasis dermatitis has resolved.   F/u in one week. Instructed to call if any questions or concerns arise in meantime.            Subjective:   03/07/25: 66 y/o F with PMHx of DM, breast CA with metastatic disease, hypothyroidism, CAD, presents for evaluation of a chronic wound on her RLE. It was previously recommended that debridement and compression be used however Martha reported she wanted to avoid debridement and compression and wanted to wash the wound daily and use Santyl. She recently was in the ED on 03/03/25 for anemia; concerns were expressed regarding her RLE ulceration while she was in the emergency department given she has not followed with wound care and canceled an appointment she had with vascular surgery. She was adamant that she did not want to be admitted at that time however the ED provider did convince patient to f/u with wound care for further evaluation and started her on Keflex for the red excoriated skin where pt was scratching. On evaluation today, pt reports she has been taking her Keflex as prescribed. She has been applying a topical antibiotic cream (unsure of which one) to her entire leg along with telfa non stick pads. She reports her RLE is very itchy causing her to scratch at it. She reports being open to the idea of skin grafting at this point given her lack of healing but did express concerns that she is concerned about her cancer spreading to the site of the wound. Denies fevers, chills.       HPI per covering provider RIANNA Mckeon: \"3/14/25: Patient presents to the wound care center for " "follow-up visit of right lower extremity wound.  Patient has been using Adaptic to the wound.  Patient offers no wound related complaints, denies increased pain or drainage.  Patient states that she feels her wound greatly improved with being on Keflex which she recently completed.  Patient is inquiring about further antibiotics today. No fever or chills. Reports she has a general surgery appointment scheduled for next week.\"     03/24/25: Pt presents for f/u RLE venous ulceration. She has been using adaptic, alginate and an ABD pad with every other day changes. Is using an ACE for compression. She has completed her course of antibiotics and has been using topical steroid ointment to her leg. She reports she met with general surgery since her previous visit regarding skin grafting/OR debridement and was told that her ulceration is healing well and there was no indication for OR debridement/skin grafting at this time. She feels as though the wound will heal with the current plan of care using adaptic, alginate and an ACE. She is also upset because she was referred to the ED for concerns of her hgb by one of her doctors and did not meet transfusion criteria and is mistrustful of the healthcare system, reports she does not want to be a \"cash cow\".           The following portions of the patient's history were reviewed and updated as appropriate:   Patient Active Problem List   Diagnosis    Chronic cutaneous venous stasis ulcer (HCC)     Past Medical History:   Diagnosis Date    Breast cancer (HCC)     Hypothyroidism     Hypothyroidism     Kidney stones      Past Surgical History:   Procedure Laterality Date    TUBAL LIGATION       No family history on file.  Social History     Socioeconomic History    Marital status:      Spouse name: Not on file    Number of children: Not on file    Years of education: Not on file    Highest education level: Not on file   Occupational History    Not on file   Tobacco Use    " Smoking status: Never    Smokeless tobacco: Never   Vaping Use    Vaping status: Never Used   Substance and Sexual Activity    Alcohol use: Not Currently    Drug use: Never    Sexual activity: Not on file   Other Topics Concern    Not on file   Social History Narrative    Not on file     Social Drivers of Health     Financial Resource Strain: High Risk (12/10/2024)    Received from [facility]    Overall Financial Resource Strain (CARDIA)     Difficulty of Paying Living Expenses: Very hard   Food Insecurity: Food Insecurity Present (12/10/2024)    Received from [facility]    Hunger Vital Sign     Worried About Running Out of Food in the Last Year: Sometimes true     Ran Out of Food in the Last Year: Sometimes true   Transportation Needs: No Transportation Needs (12/10/2024)    Received from [facility]    PRAPARE - Transportation     Lack of Transportation (Medical): No     Lack of Transportation (Non-Medical): No   Physical Activity: Not on file   Stress: Not on file   Social Connections: Not on file   Intimate Partner Violence: Not At Risk (12/10/2024)    Received from [facility]    Humiliation, Afraid, Rape, and Kick questionnaire     Fear of Current or Ex-Partner: No     Emotionally Abused: No     Physically Abused: No     Sexually Abused: No   Housing Stability: Low Risk  (12/10/2024)    Received from [facility]    Housing Stability Vital Sign     Unable to Pay for Housing in the Last Year: No     Number of Times Moved in the Last Year: 0     Homeless in the Last Year: No       Current Outpatient Medications:     acetaminophen (TYLENOL) 650 mg CR tablet, Take 650 mg by mouth every 8 (eight) hours as needed for mild pain (Patient not taking: Reported on 3/21/2025), Disp: , Rfl:     anastrozole (ARIMIDEX) 1 mg tablet, Take 1 mg by mouth daily, Disp: , Rfl:     ascorbic acid (VITAMIN C) 250 mg tablet, Take 250 mg by mouth  daily, Disp: , Rfl:     aspirin 81 mg chewable tablet, Chew 81 mg daily, Disp: , Rfl:     atorvastatin (LIPITOR) 40 mg tablet, Take 40 mg by mouth daily, Disp: , Rfl:     benzonatate (TESSALON PERLES) 100 mg capsule, Take 1 capsule (100 mg total) by mouth every 8 (eight) hours (Patient not taking: Reported on 7/11/2024), Disp: 21 capsule, Rfl: 0    Cholecalciferol (VITAMIN D-3 PO), Take by mouth Per patient, she takes 50mg daily., Disp: , Rfl:     ciprofloxacin (CIPRO) 500 mg tablet, Take 500 mg by mouth 2 (two) times a day, Disp: , Rfl:     clotrimazole (LOTRIMIN) 1 % cream, Apply to affected area 2 times daily for 4 weeks (Patient not taking: Reported on 7/11/2024), Disp: 15 g, Rfl: 0    collagenase (SANTYL) ointment, Apply topically daily To wound of the RLE, Disp: 90 g, Rfl: 1    HYDROcodone-acetaminophen (Norco) 5-325 mg per tablet, Take 1 tablet by mouth every 6 (six) hours as needed for pain Max Daily Amount: 4 tablets (Patient not taking: Reported on 7/11/2024), Disp: 15 tablet, Rfl: 0    ibuprofen (MOTRIN) 800 mg tablet, Take 1 tablet (800 mg total) by mouth every 8 (eight) hours as needed for mild pain (Patient not taking: Reported on 1/12/2022), Disp: 45 tablet, Rfl: 0    levothyroxine 50 mcg tablet, Take 1 tablet by mouth in the morning, Disp: , Rfl:     levothyroxine 88 mcg tablet, Take 50 mcg by mouth daily (Patient not taking: Reported on 3/14/2025), Disp: , Rfl:     methylPREDNISolone 4 MG tablet therapy pack, Use as directed on package (Patient not taking: Reported on 1/12/2022), Disp: 21 tablet, Rfl: 0    Multiple Vitamins-Minerals (CENTRUM SILVER 50+WOMEN PO), Take 1 tablet by mouth daily (Patient not taking: Reported on 3/21/2025), Disp: , Rfl:     pantoprazole (PROTONIX) 40 mg tablet, Take 40 mg by mouth 2 (two) times a day, Disp: , Rfl:     sertraline (ZOLOFT) 25 mg tablet, Take 25 mg by mouth daily (Patient not taking: Reported on 3/21/2025), Disp: , Rfl:     torsemide (DEMADEX) 10 mg tablet,  Take 10 mg by mouth daily, Disp: , Rfl:     triamcinolone (KENALOG) 0.1 % ointment, Apply topically 2 (two) times a day for 7 days To itching skin of RLE (Patient not taking: Reported on 3/14/2025), Disp: 60 g, Rfl: 1    Verzenio 100 MG TABS, Take 1 tablet by mouth 2 (two) times a day, Disp: , Rfl:     vitamin B-12 (VITAMIN B-12) 1,000 mcg tablet, Take 1,000 mcg by mouth daily, Disp: , Rfl:     Review of Systems      Objective:  /70   Pulse 101   Temp 97.5 °F (36.4 °C)   Resp 18   Pain Score:   3     Physical Exam  Vitals reviewed.   Cardiovascular:      Rate and Rhythm: Normal rate.      Pulses:           Dorsalis pedis pulses are 2+ on the right side and 2+ on the left side.        Posterior tibial pulses are 2+ on the right side and 2+ on the left side.   Pulmonary:      Effort: Pulmonary effort is normal. No respiratory distress.   Musculoskeletal:      Right lower leg: Edema present.      Left lower leg: Edema present.   Skin:     Findings: Wound present. No erythema (resolved) or rash (resolved).             Comments: VSU of the R medial lower leg. Continues to have a large ulceration present on her medial ankle. Ulceration with 100% exudate and slough. No granulation tissue visible. No evidence of edge epithelization to indicate healing. Drainage is moderate. Periwound erythema and diffuse erythema has significantly improved/is now resolved.    Neurological:      Mental Status: She is alert.   Psychiatric:         Mood and Affect: Mood is depressed. Affect is labile.         Wound 03/07/25 Leg Right;Lower (Active)   Wound Image    03/24/25 1114   Wound Description Epithelialization;Yellow;Pink;Fragile;Edema;Slough 03/24/25 1108   Non-staged Wound Description Full thickness 03/24/25 1108   Wound Length (cm) 14 cm 03/24/25 1108   Wound Width (cm) 15 cm 03/24/25 1108   Wound Depth (cm) 0.1 cm 03/24/25 1108   Wound Surface Area (cm^2) 210 cm^2 03/24/25 1108   Wound Volume (cm^3) 21 cm^3 03/24/25 1108    Calculated Wound Volume (cm^3) 21 cm^3 03/24/25 1108   Change in Wound Size % 84.15 03/24/25 1108   Drainage Amount Moderate 03/24/25 1108   Drainage Description Serous;Yellow 03/24/25 1108   Joy-wound Assessment Edema;Excoriated;Fragile;Pink;Scar Tissue 03/24/25 1108   Treatments Cleansed 03/14/25 1128   Dressing Status Intact;Old drainage 03/24/25 1108                         Wound Instructions:  Orders Placed This Encounter   Procedures    Wound cleansing and dressings Right;Lower Leg     Right leg ulcer      Wash your hands with soap and water.  Remove old dressing, discard into plastic bag and place in trash.  Cleanse the wound with  prior to applying a clean dressing. Do not use tissue or cotton balls. Do not scrub the wound. Pat dry using gauze.     Shower yes - remove dressing, wash leg last with unscented Dove soap, rince well, pat dry with gauze        Stop using the topical steroid cream   Apply Adaptic (oil emulsion non adherent layer) to the right leg wound.  Then apply Calcium Alginate to the wound.   Cover with ABD pads   Secure with kerlex to the lower leg and tape .  May lightly apply Ace Bandage to lower leg  Change dressing every 3 days, and as needed for excessive drainage.       *Cut your wound supplies to fit the wound in order to save supplies for dressing changes.        Keep pressure off wound at all times      Elevates legs as much as possible             Please call the Wound Management Center Monday through Friday between 8-430 for any questions.       Monitor for any signs or symptoms of infection such as increase redness or pain, fever/chills, nausea/vomiting, malodor, or  increased drainage. If you have any signs or symptoms please call the wound center, if after hours or on holiday/weekend call your primary care provider or go to the emergency department to be evaluated          Follow up in 1 week Monday 3/31/25.     Standing Status:   Future     Expiration Date:   3/31/2025     "Wound Procedure Treatment Right;Lower Leg     This order was created via procedure documentation       Cally Hernandez, PA-C      Portions of the record may have been created with voice recognition software. Occasional wrong word or \"sound alike\" substitutions may have occurred due to the inherent limitations of voice recognition software. Read the chart carefully and recognize, using context, where substitutions have occurred.    "

## 2025-03-24 NOTE — PATIENT INSTRUCTIONS
Orders Placed This Encounter   Procedures    Wound cleansing and dressings Right;Lower Leg     Right leg ulcer:    An Unna boot was applied to your right leg today.       Unna Boot/ Multi-layer compression wrap Instructions:    Keep compression wrap/wraps clean and dry. You may NOT shower. You may sponge bathe or use a cast cover. Do NOT get unna boot wet.  If wraps are too tight and you experience numbness/tingling, call the wound center. If after hours, remove wraps or proceed to nearest E.R. and call wound center in AM.  Wrap will be changed 2x weekly  Avoid prolonged standing in one place.  Elevate leg(s) above the level of the heart when sitting or as much as possible.              Please call the Wound Management Center Monday through Friday between 8-430 for any questions.       Monitor for any signs or symptoms of infection such as increase redness or pain, fever/chills, nausea/vomiting, malodor, or  increased drainage. If you have any signs or symptoms please call the wound center, if after hours or on holiday/weekend call your primary care provider or go to the emergency department to be evaluated            Follow up on Thursday for wrap change.     Standing Status:   Future     Expiration Date:   3/31/2025    Wound cleansing and dressings Right;Lower Leg     Right leg ulcer      Wash your hands with soap and water.  Remove old dressing, discard into plastic bag and place in trash.  Cleanse the wound with  prior to applying a clean dressing. Do not use tissue or cotton balls. Do not scrub the wound. Pat dry using gauze.     Shower yes - remove dressing, wash leg last with unscented Dove soap, rince well, pat dry with gauze        Stop using the topical steroid cream   Apply Adaptic (oil emulsion non adherent layer) to the right leg wound.  Then apply Calcium Alginate to the wound.   Cover with ABD pads   Secure with kerlex to the lower leg and tape .  May lightly apply Ace Bandage to lower leg  Change  dressing every 3 days, and as needed for excessive drainage.       *Cut your wound supplies to fit the wound in order to save supplies for dressing changes.        Keep pressure off wound at all times      Elevates legs as much as possible             Please call the Wound Management Center Monday through Friday between 8-430 for any questions.       Monitor for any signs or symptoms of infection such as increase redness or pain, fever/chills, nausea/vomiting, malodor, or  increased drainage. If you have any signs or symptoms please call the wound center, if after hours or on holiday/weekend call your primary care provider or go to the emergency department to be evaluated          Follow up in 1 week Monday 3/31/25.     Standing Status:   Future     Expiration Date:   3/31/2025

## 2025-03-24 NOTE — PROGRESS NOTES
Wound Procedure Treatment Right;Lower Leg    Performed by: Colleen Obrien RN  Authorized by: Carmen Hernandez PA-C  Associated wounds:   Wound 03/07/25 Leg Right;Lower    Wound cleansed with:  Soap and water   Applied primary dressing:  Non adherent contact layer and Calcium alginate   Applied secondary dressing:  ABD   Dressing secured with:  Pedro, Tape and Compression wrap   Adaptic  Ace

## 2025-03-31 ENCOUNTER — OFFICE VISIT (OUTPATIENT)
Facility: HOSPITAL | Age: 65
End: 2025-03-31
Payer: MEDICARE

## 2025-03-31 VITALS
SYSTOLIC BLOOD PRESSURE: 136 MMHG | RESPIRATION RATE: 18 BRPM | HEART RATE: 102 BPM | DIASTOLIC BLOOD PRESSURE: 60 MMHG | TEMPERATURE: 97 F

## 2025-03-31 DIAGNOSIS — L97.812 VENOUS STASIS ULCER OF OTHER PART OF RIGHT LOWER LEG WITH FAT LAYER EXPOSED, UNSPECIFIED WHETHER VARICOSE VEINS PRESENT (HCC): Primary | ICD-10-CM

## 2025-03-31 DIAGNOSIS — I83.018 VENOUS STASIS ULCER OF OTHER PART OF RIGHT LOWER LEG WITH FAT LAYER EXPOSED, UNSPECIFIED WHETHER VARICOSE VEINS PRESENT (HCC): Primary | ICD-10-CM

## 2025-03-31 DIAGNOSIS — E11.69 TYPE 2 DIABETES MELLITUS WITH OTHER SPECIFIED COMPLICATION, WITHOUT LONG-TERM CURRENT USE OF INSULIN (HCC): ICD-10-CM

## 2025-03-31 DIAGNOSIS — Z91.199 NON COMPLIANCE WITH MEDICAL TREATMENT: ICD-10-CM

## 2025-03-31 PROCEDURE — 99213 OFFICE O/P EST LOW 20 MIN: CPT | Performed by: STUDENT IN AN ORGANIZED HEALTH CARE EDUCATION/TRAINING PROGRAM

## 2025-03-31 PROCEDURE — 99214 OFFICE O/P EST MOD 30 MIN: CPT | Performed by: STUDENT IN AN ORGANIZED HEALTH CARE EDUCATION/TRAINING PROGRAM

## 2025-03-31 NOTE — PROGRESS NOTES
Patient ID: Martha Payan is a 65 y.o. female Date of Birth 1960       Chief Complaint   Patient presents with    Follow Up Wound Care Visit     Pt continues to refuse lidocaine and debridement of RLE wound. Wants to follow up with Dr. Xiao surgeon and will call [facility] when ready for appt       Allergies:  Amoxicillin-pot clavulanate, Clindamycin, Iodinated contrast media, Sulfa antibiotics, and Sulfamethoxazole-trimethoprim    Diagnosis:   Diagnosis ICD-10-CM Associated Orders   1. Venous stasis ulcer of other part of right lower leg with fat layer exposed, unspecified whether varicose veins present ([facility])  I83.018 Wound Procedure Treatment Right;Lower Leg    L97.812       2. Non compliance with medical treatment  Z91.199 Wound Procedure Treatment Right;Lower Leg      3. Type 2 diabetes mellitus with other specified complication, without long-term current use of insulin ([facility])  E11.69            Assessment  & Plan:    F/u VSU of the R medial lower leg and small ulcer on the lateral leg. Continues to have a large ulceration present on the medial lower leg without any significant improvement in size of the large ulcer. Ulceration appears dry with overlying exudate. No healthy appearing granulation tissue is visible within the ulcer base. Skin with brawny discoloration but no erythema present to indicate soft tissue infection.   I had a discussion with Marhta again today that this ulceration is not expected to heal if we are not treating the ulceration with the standard of care for venous ulcerations which includes debridements and use of multilayer compression wraps. In addition, I would like to take a biopsy to ensure no malignant transformation given the chronicity of the ulcer. It may be possible to have this ulcer heal by secondary intention without skin grafting if we are able to debride, take a biopsy and utilize multilayer compression however since she continues to refuse these and cannot tolerate  "outpatient debridement I do not see how this ulceration is going to heal. There is no healthy appearing tissue within the ulceration or signs of healing to indicate that this will heal in its present state. She reports she disagrees and will be following with general surgery on 04/21/25 to get their opinion and will consider calling for a f/u if she feels as though a f/u is needed. Offered to have her meet with Dr. Blount for additional opinion/discussion of her care but she declines.          Subjective:   03/07/25: 66 y/o F with PMHx of DM, breast CA with metastatic disease, hypothyroidism, CAD, presents for evaluation of a chronic wound on her RLE. It was previously recommended that debridement and compression be used however Martha reported she wanted to avoid debridement and compression and wanted to wash the wound daily and use Santyl. She recently was in the ED on 03/03/25 for anemia; concerns were expressed regarding her RLE ulceration while she was in the emergency department given she has not followed with wound care and canceled an appointment she had with vascular surgery. She was adamant that she did not want to be admitted at that time however the ED provider did convince patient to f/u with wound care for further evaluation and started her on Keflex for the red excoriated skin where pt was scratching. On evaluation today, pt reports she has been taking her Keflex as prescribed. She has been applying a topical antibiotic cream (unsure of which one) to her entire leg along with telfa non stick pads. She reports her RLE is very itchy causing her to scratch at it. She reports being open to the idea of skin grafting at this point given her lack of healing but did express concerns that she is concerned about her cancer spreading to the site of the wound. Denies fevers, chills.       HPI per covering provider RIANNA Mckeon: \"3/14/25: Patient presents to the wound care center for follow-up " "visit of right lower extremity wound.  Patient has been using Adaptic to the wound.  Patient offers no wound related complaints, denies increased pain or drainage.  Patient states that she feels her wound greatly improved with being on Keflex which she recently completed.  Patient is inquiring about further antibiotics today. No fever or chills. Reports she has a general surgery appointment scheduled for next week.\"     03/24/25: Pt presents for f/u RLE venous ulceration. She has been using adaptic, alginate and an ABD pad with every other day changes. Is using an ACE for compression. She has completed her course of antibiotics and has been using topical steroid ointment to her leg. She reports she met with general surgery since her previous visit regarding skin grafting/OR debridement and was told that her ulceration is healing well and there was no indication for OR debridement/skin grafting at this time. She feels as though the wound will heal with the current plan of care using adaptic, alginate and an ACE. She is also upset because she was referred to the ED for concerns of her hgb by one of her doctors and did not meet transfusion criteria and is mistrustful of the healthcare system, reports she does not want to be a \"cash cow\".       03/31/25: Pt presents for f/u RLE venous ulceration. She has continued with use of adaptic and alginate with an ACE wrap for compression. She is insistent that this wound will heal with her current regimen and that she does not wish to keep coming to appointments d/t the co-pays and lack of improvement in the wound. She would like to see what general surgery has to say when she follows with them on 04/21/25.           The following portions of the patient's history were reviewed and updated as appropriate:   Patient Active Problem List   Diagnosis    Chronic cutaneous venous stasis ulcer (HCC)     Past Medical History:   Diagnosis Date    Breast cancer (HCC)     Hypothyroidism  "    Hypothyroidism     Kidney stones      Past Surgical History:   Procedure Laterality Date    TUBAL LIGATION       No family history on file.  Social History     Socioeconomic History    Marital status:      Spouse name: Not on file    Number of children: Not on file    Years of education: Not on file    Highest education level: Not on file   Occupational History    Not on file   Tobacco Use    Smoking status: Never    Smokeless tobacco: Never   Vaping Use    Vaping status: Never Used   Substance and Sexual Activity    Alcohol use: Not Currently    Drug use: Never    Sexual activity: Not on file   Other Topics Concern    Not on file   Social History Narrative    Not on file     Social Drivers of Health     Financial Resource Strain: High Risk (12/10/2024)    Received from [facility]    Overall Financial Resource Strain (CARDIA)     Difficulty of Paying Living Expenses: Very hard   Food Insecurity: Food Insecurity Present (12/10/2024)    Received from [facility]    Hunger Vital Sign     Worried About Running Out of Food in the Last Year: Sometimes true     Ran Out of Food in the Last Year: Sometimes true   Transportation Needs: No Transportation Needs (12/10/2024)    Received from [facility]    PRAPARE - Transportation     Lack of Transportation (Medical): No     Lack of Transportation (Non-Medical): No   Physical Activity: Not on file   Stress: Not on file   Social Connections: Not on file   Intimate Partner Violence: Not At Risk (12/10/2024)    Received from [facility]    Humiliation, Afraid, Rape, and Kick questionnaire     Fear of Current or Ex-Partner: No     Emotionally Abused: No     Physically Abused: No     Sexually Abused: No   Housing Stability: Low Risk  (12/10/2024)    Received from [facility]    Housing Stability Vital Sign     Unable to Pay for Housing in the Last Year: No     Number of Times Moved in the  Last Year: 0     Homeless in the Last Year: No       Current Outpatient Medications:     FERROUS SULFATE PO, Take 65 mg by mouth in the morning As per GI doctor recommendation, Disp: , Rfl:     acetaminophen (TYLENOL) 650 mg CR tablet, Take 650 mg by mouth every 8 (eight) hours as needed for mild pain (Patient not taking: Reported on 3/21/2025), Disp: , Rfl:     anastrozole (ARIMIDEX) 1 mg tablet, Take 1 mg by mouth daily, Disp: , Rfl:     ascorbic acid (VITAMIN C) 250 mg tablet, Take 250 mg by mouth daily, Disp: , Rfl:     aspirin 81 mg chewable tablet, Chew 81 mg daily, Disp: , Rfl:     atorvastatin (LIPITOR) 40 mg tablet, Take 40 mg by mouth daily, Disp: , Rfl:     benzonatate (TESSALON PERLES) 100 mg capsule, Take 1 capsule (100 mg total) by mouth every 8 (eight) hours (Patient not taking: Reported on 7/11/2024), Disp: 21 capsule, Rfl: 0    Cholecalciferol (VITAMIN D-3 PO), Take by mouth Per patient, she takes 50mg daily., Disp: , Rfl:     ciprofloxacin (CIPRO) 500 mg tablet, Take 500 mg by mouth 2 (two) times a day, Disp: , Rfl:     clotrimazole (LOTRIMIN) 1 % cream, Apply to affected area 2 times daily for 4 weeks (Patient not taking: Reported on 7/11/2024), Disp: 15 g, Rfl: 0    collagenase (SANTYL) ointment, Apply topically daily To wound of the RLE, Disp: 90 g, Rfl: 1    HYDROcodone-acetaminophen (Norco) 5-325 mg per tablet, Take 1 tablet by mouth every 6 (six) hours as needed for pain Max Daily Amount: 4 tablets (Patient not taking: Reported on 7/11/2024), Disp: 15 tablet, Rfl: 0    ibuprofen (MOTRIN) 800 mg tablet, Take 1 tablet (800 mg total) by mouth every 8 (eight) hours as needed for mild pain (Patient not taking: Reported on 1/12/2022), Disp: 45 tablet, Rfl: 0    levothyroxine 50 mcg tablet, Take 1 tablet by mouth in the morning, Disp: , Rfl:     levothyroxine 88 mcg tablet, Take 50 mcg by mouth daily (Patient not taking: Reported on 3/14/2025), Disp: , Rfl:     methylPREDNISolone 4 MG tablet therapy  pack, Use as directed on package (Patient not taking: Reported on 1/12/2022), Disp: 21 tablet, Rfl: 0    Multiple Vitamins-Minerals (CENTRUM SILVER 50+WOMEN PO), Take 1 tablet by mouth daily (Patient not taking: Reported on 3/21/2025), Disp: , Rfl:     pantoprazole (PROTONIX) 40 mg tablet, Take 40 mg by mouth 2 (two) times a day, Disp: , Rfl:     sertraline (ZOLOFT) 25 mg tablet, Take 25 mg by mouth daily (Patient not taking: Reported on 3/21/2025), Disp: , Rfl:     torsemide (DEMADEX) 10 mg tablet, Take 10 mg by mouth daily, Disp: , Rfl:     triamcinolone (KENALOG) 0.1 % ointment, Apply topically 2 (two) times a day for 7 days To itching skin of RLE (Patient not taking: Reported on 3/14/2025), Disp: 60 g, Rfl: 1    Verzenio 100 MG TABS, Take 1 tablet by mouth 2 (two) times a day, Disp: , Rfl:     vitamin B-12 (VITAMIN B-12) 1,000 mcg tablet, Take 1,000 mcg by mouth daily, Disp: , Rfl:     Review of Systems      Objective:  /60   Pulse 102   Temp (!) 97 °F (36.1 °C)   Resp 18         Physical Exam  Vitals reviewed.   Cardiovascular:      Rate and Rhythm: Normal rate.      Pulses:           Dorsalis pedis pulses are 2+ on the right side and 2+ on the left side.        Posterior tibial pulses are 2+ on the right side and 2+ on the left side.   Pulmonary:      Effort: Pulmonary effort is normal. No respiratory distress.   Musculoskeletal:      Right lower leg: Edema present.      Left lower leg: Edema present.   Skin:     Findings: Wound present. No erythema (resolved).             Comments: VSU of the R medial lower leg. Continues to have a large ulceration present on the medial lower leg without any significant improvement in size of the large ulcer. Ulceration appears dry with overlying exudate. No healthy appearing granulation tissue is visible within the ulcer base. Skin with brawny discoloration but no erythema present to indicate soft tissue infection.      Neurological:      Mental Status: She is  "alert.   Psychiatric:         Mood and Affect: Mood is depressed. Affect is labile.               Wound 03/07/25 Leg Right;Lower (Active)   Wound Image Images linked 03/31/25 1019   Wound Description Epithelialization;Yellow;Fragile;Edema;Slough;Granulation tissue;Tan 03/31/25 1018   Non-staged Wound Description Full thickness 03/31/25 1018   Wound Length (cm) 6.8 cm 03/31/25 1018   Wound Width (cm) 8.8 cm 03/31/25 1018   Wound Depth (cm) 0.1 cm 03/31/25 1018   Wound Surface Area (cm^2) 59.84 cm^2 03/31/25 1018   Wound Volume (cm^3) 5.984 cm^3 03/31/25 1018   Calculated Wound Volume (cm^3) 5.98 cm^3 03/31/25 1018   Change in Wound Size % 95.49 03/31/25 1018   Drainage Amount Moderate 03/31/25 1018   Drainage Description Yellow;Tan 03/31/25 1018   Joy-wound Assessment Edema;Pink;Scar Tissue;Fragile 03/31/25 1018   Dressing Status Intact;Old drainage 03/31/25 1018               Wound Instructions:  Orders Placed This Encounter   Procedures    Wound Procedure Treatment Right;Lower Leg     This order was created via procedure documentation       Cally Hernandez, PA-C      Portions of the record may have been created with voice recognition software. Occasional wrong word or \"sound alike\" substitutions may have occurred due to the inherent limitations of voice recognition software. Read the chart carefully and recognize, using context, where substitutions have occurred.    "

## 2025-03-31 NOTE — PROGRESS NOTES
Wound Procedure Treatment Right;Lower Leg    Performed by: Shanika Valera RN  Authorized by: Carmen Hernandez PA-C  Associated wounds:   Wound 03/07/25 Leg Right;Lower    Wound cleansed with:  Wound aggrssively cleansed with NSS and gauze   Applied primary dressing:  Non adherent contact layer, Silver and Gelling fiber   Applied secondary dressing:  ABD   Dressing secured with:  Kerlix and Tape   Comments:  ACE wrap

## 2025-04-17 NOTE — PROGRESS NOTES
Name: Martha Payan      : 1960      MRN: [id number]  Encounter Provider: Paulo Cota PA-C  Encounter Date: 2025   Encounter department: [facility] GENERAL SURGERY [place]  :  Assessment & Plan  Chronic cutaneous venous stasis ulcer (HCC)  Returns for 1 month wound check. Since last visit had been using Triamcinolone cream within the wound bed and noticed less slough. Otherwise no changes. Evaluation of the RLE reveals brawny skin change to the RLL. Sensation intact with cap refill <2 seconds to the foot. No edema present. Dressing removed revealing an open wound of the ankle measuring 70 x 75 x 2 mm in size. Wound is moist with scattered yellow slough, most concentrated centrally and covering about 50% of the wound bed. Unclear if this central wound defect is full thickness or not. Based on prior photos it appears as though the exudative crust has re-hydrated and partially sloughed off. Advised we transition to trial of Xeroform and ABD every other day to promote this. Will work on a DME order for her and plan to see back in 2 weeks, sooner as needed. If not effective, would discuss role of debridement in the office.           Irritant contact dermatitis due to drug in contact with skin  New Rx sent for Kenolog cream for irritation around wound PRN.  Orders:    triamcinolone (KENALOG) 0.5 % cream; Apply topically every other day        History of Present Illness   HPI  Martha Payan is a 65 y.o. female who presents for a 1 mo f/u on right lower leg non-healing open wound. Pt states the wound is still draining but no foul smell and she suffers from mild pain. No fevers/chills. Amilia.O,MA  History obtained from: patient    Review of Systems   Skin:  Positive for wound.   All other systems reviewed and are negative.    Past Medical History   Past Medical History:   Diagnosis Date    Breast cancer (HCC)     Hypothyroidism     Hypothyroidism     Kidney stones      Past Surgical History:    Procedure Laterality Date    TUBAL LIGATION       History reviewed. No pertinent family history.   reports that she has never smoked. She has never used smokeless tobacco. She reports that she does not currently use alcohol. She reports that she does not use drugs.  Current Outpatient Medications   Medication Instructions    acetaminophen (TYLENOL) 650 mg, Every 8 hours PRN    anastrozole (ARIMIDEX) 1 mg, Daily    ascorbic acid (VITAMIN C) 250 mg, Daily    aspirin 81 mg, Daily    atorvastatin (LIPITOR) 40 mg, Daily    benzonatate (TESSALON PERLES) 100 mg, Oral, Every 8 hours    Cholecalciferol (VITAMIN D-3 PO) Take by mouth Per patient, she takes 50mg daily.    ciprofloxacin (CIPRO) 500 mg, 2 times daily    clopidogrel (PLAVIX) 75 mg, Daily    clotrimazole (LOTRIMIN) 1 % cream Apply to affected area 2 times daily for 4 weeks    FERROUS SULFATE PO 65 mg, Daily    HYDROcodone-acetaminophen (Norco) 5-325 mg per tablet 1 tablet, Oral, Every 6 hours PRN    ibuprofen (MOTRIN) 800 mg, Oral, Every 8 hours PRN    levothyroxine 50 mcg tablet 1 tablet, Daily    levothyroxine 50 mcg, Daily    methylPREDNISolone 4 MG tablet therapy pack Use as directed on package    Multiple Vitamins-Minerals (CENTRUM SILVER 50+WOMEN PO) 1 tablet, Daily    pantoprazole (PROTONIX) 40 mg, 2 times daily    sertraline (ZOLOFT) 25 mg, Daily    torsemide (DEMADEX) 10 mg, Oral, Daily    triamcinolone (KENALOG) 0.5 % cream Topical, Every other day    Verzenio 100 MG TABS 1 tablet, 2 times daily    vitamin B-12 (VITAMIN B-12) 1,000 mcg, Daily     Allergies   Allergen Reactions    Amoxicillin-Pot Clavulanate Other (See Comments)     Other Reaction(s): rash    Clindamycin Other (See Comments)     Chest tightness    Iodinated Contrast Media Other (See Comments)    Sulfa Antibiotics Other (See Comments)    Sulfamethoxazole-Trimethoprim Hives         Objective   /80 (BP Location: Left arm, Patient Position: Sitting, Cuff Size: Standard)   Pulse 103   " Temp 97.6 °F (36.4 °C) (Temporal)   Ht 5' 3\" (1.6 m)   Wt 90.7 kg (200 lb)   SpO2 96%   BMI 35.43 kg/m²      Physical Exam  Vitals and nursing note reviewed.   Constitutional:       General: She is not in acute distress.     Appearance: She is well-developed. She is not diaphoretic.   HENT:      Head: Normocephalic and atraumatic.   Eyes:      Conjunctiva/sclera: Conjunctivae normal.      Pupils: Pupils are equal, round, and reactive to light.   Pulmonary:      Effort: No respiratory distress.   Musculoskeletal:         General: Normal range of motion.      Cervical back: Normal range of motion.   Skin:     General: Skin is warm and dry.      Capillary Refill: Capillary refill takes less than 2 seconds.      Comments: Large open wound to right ankle as described above. No active infection or edema.   Neurological:      Mental Status: She is alert and oriented to person, place, and time.   Psychiatric:         Behavior: Behavior normal.           "

## 2025-04-21 ENCOUNTER — OFFICE VISIT (OUTPATIENT)
Dept: SURGERY | Facility: CLINIC | Age: 65
End: 2025-04-21
Payer: COMMERCIAL

## 2025-04-21 VITALS
HEIGHT: 63 IN | TEMPERATURE: 97.6 F | DIASTOLIC BLOOD PRESSURE: 80 MMHG | OXYGEN SATURATION: 96 % | WEIGHT: 200 LBS | HEART RATE: 103 BPM | BODY MASS INDEX: 35.44 KG/M2 | SYSTOLIC BLOOD PRESSURE: 146 MMHG

## 2025-04-21 DIAGNOSIS — L97.909 CHRONIC CUTANEOUS VENOUS STASIS ULCER (HCC): Primary | ICD-10-CM

## 2025-04-21 DIAGNOSIS — I83.009 CHRONIC CUTANEOUS VENOUS STASIS ULCER (HCC): Primary | ICD-10-CM

## 2025-04-21 DIAGNOSIS — L24.4 IRRITANT CONTACT DERMATITIS DUE TO DRUG IN CONTACT WITH SKIN: ICD-10-CM

## 2025-04-21 PROCEDURE — 99213 OFFICE O/P EST LOW 20 MIN: CPT | Performed by: PHYSICIAN ASSISTANT

## 2025-04-21 RX ORDER — CLOPIDOGREL BISULFATE 75 MG/1
75 TABLET ORAL DAILY
COMMUNITY
Start: 2024-12-14

## 2025-04-21 RX ORDER — TRIAMCINOLONE ACETONIDE 5 MG/G
CREAM TOPICAL EVERY OTHER DAY
Qty: 15 G | Refills: 1 | Status: SHIPPED | OUTPATIENT
Start: 2025-04-21

## 2025-04-21 NOTE — ASSESSMENT & PLAN NOTE
Returns for 1 month wound check. Since last visit had been using Triamcinolone cream within the wound bed and noticed less slough. Otherwise no changes. Evaluation of the RLE reveals brawny skin change to the RLL. Sensation intact with cap refill <2 seconds to the foot. No edema present. Dressing removed revealing an open wound of the ankle measuring 70 x 75 x 2 mm in size. Wound is moist with scattered yellow slough, most concentrated centrally and covering about 50% of the wound bed. Unclear if this central wound defect is full thickness or not. Based on prior photos it appears as though the exudative crust has re-hydrated and partially sloughed off. Advised we transition to trial of Xeroform and ABD every other day to promote this. Will work on a DME order for her and plan to see back in 2 weeks, sooner as needed. If not effective, would discuss role of debridement in the office.

## 2025-04-29 ENCOUNTER — TELEPHONE (OUTPATIENT)
Age: 65
End: 2025-04-29

## 2025-04-29 NOTE — TELEPHONE ENCOUNTER
Patient stated that the pharmacy did not receive the gauze covering for her leg wound. Made a warm transfer of the call to Peter in the office.

## 2025-04-30 LAB
DME PARACHUTE DELIVERY DATE ACTUAL: NORMAL
DME PARACHUTE DELIVERY DATE REQUESTED: NORMAL
DME PARACHUTE ITEM DESCRIPTION: NORMAL
DME PARACHUTE ORDER STATUS: NORMAL
DME PARACHUTE SUPPLIER NAME: NORMAL
DME PARACHUTE SUPPLIER PHONE: NORMAL

## 2025-05-02 NOTE — PROGRESS NOTES
Name: Martha Payan      : 1960      MRN: [id number]  Encounter Provider: Paulo Cota PA-C  Encounter Date: 2025   Encounter department: [facility] GENERAL SURGERY [place]  :  Assessment & Plan  Chronic cutaneous venous stasis ulcer (HCC)  Yellow exudate has softened, but persists over the entire wound bed. Likely represents exudate/biofilm. Doing well with xeroform, but to promote micro debridement of the exudate will transition to wet to dry gauze dressings changed daily with associated soap scrub. Questions answered, agreeable to the plan. Will see back in 2 weeks for repeat wound check, sooner as needed. Measures 9 x 7 x 0.2 cm today.             History of Present Illness   HPI  Martha Payan is a 65 y.o. female who presents for a 2 wk f/u for a wound check of the right leg. Pt states she's having some issues with the Xerform that was given by ITN. Pt states the yellow dressing is staying in her wound making it a little crusty. Pt sometimes has pain, pt denies foul smell, or fevers/chills. Amilia.O,MA  History obtained from: patient    Review of Systems   All other systems reviewed and are negative.    Past Medical History   Past Medical History:   Diagnosis Date    Breast cancer (HCC)     Hypothyroidism     Hypothyroidism     Kidney stones      Past Surgical History:   Procedure Laterality Date    TUBAL LIGATION       History reviewed. No pertinent family history.   reports that she has never smoked. She has never used smokeless tobacco. She reports that she does not currently use alcohol. She reports that she does not use drugs.  Current Outpatient Medications   Medication Instructions    acetaminophen (TYLENOL) 650 mg, Every 8 hours PRN    anastrozole (ARIMIDEX) 1 mg, Daily    ascorbic acid (VITAMIN C) 250 mg, Daily    aspirin 81 mg, Daily    atorvastatin (LIPITOR) 40 mg, Daily    benzonatate (TESSALON PERLES) 100 mg, Oral, Every 8 hours    Cholecalciferol (VITAMIN D-3  "PO) Take by mouth Per patient, she takes 50mg daily.    ciprofloxacin (CIPRO) 500 mg, 2 times daily    clopidogrel (PLAVIX) 75 mg, Daily    clotrimazole (LOTRIMIN) 1 % cream Apply to affected area 2 times daily for 4 weeks    FERROUS SULFATE PO 65 mg, Daily    HYDROcodone-acetaminophen (Norco) 5-325 mg per tablet 1 tablet, Oral, Every 6 hours PRN    ibuprofen (MOTRIN) 800 mg, Oral, Every 8 hours PRN    levothyroxine 50 mcg tablet 1 tablet, Daily    levothyroxine 50 mcg, Daily    methylPREDNISolone 4 MG tablet therapy pack Use as directed on package    Multiple Vitamins-Minerals (CENTRUM SILVER 50+WOMEN PO) 1 tablet, Daily    pantoprazole (PROTONIX) 40 mg, 2 times daily    sertraline (ZOLOFT) 25 mg, Daily    torsemide (DEMADEX) 10 mg, Oral, Daily    triamcinolone (KENALOG) 0.5 % cream Topical, Every other day    Verzenio 100 MG TABS 1 tablet, 2 times daily    vitamin B-12 (VITAMIN B-12) 1,000 mcg, Daily     Allergies   Allergen Reactions    Amoxicillin-Pot Clavulanate Other (See Comments)     Other Reaction(s): rash    Clindamycin Other (See Comments)     Chest tightness    Iodinated Contrast Media Other (See Comments)    Sulfa Antibiotics Other (See Comments)    Sulfamethoxazole-Trimethoprim Hives         Objective   /60 (BP Location: Left arm, Patient Position: Sitting, Cuff Size: Large)   Pulse 89   Temp 97.5 °F (36.4 °C) (Temporal)   Ht 5' 3\" (1.6 m)   Wt 92.1 kg (203 lb)   SpO2 99%   BMI 35.96 kg/m²      Physical Exam  Vitals and nursing note reviewed.   Constitutional:       General: She is not in acute distress.     Appearance: She is well-developed. She is not diaphoretic.   HENT:      Head: Normocephalic and atraumatic.   Eyes:      Conjunctiva/sclera: Conjunctivae normal.      Pupils: Pupils are equal, round, and reactive to light.   Pulmonary:      Effort: No respiratory distress.   Musculoskeletal:         General: Normal range of motion.      Cervical back: Normal range of motion.   Skin:     " General: Skin is warm and dry.      Capillary Refill: Capillary refill takes less than 2 seconds.      Comments: Open ulceration to right ankle with thin overlying layer of smooth yellow exudate. Can note pink coloration of tissue below exudate including over the central portion. No deeper wound, tunnel, sinus, or signs of infection. 9 x 7 x .2   Neurological:      Mental Status: She is alert and oriented to person, place, and time.   Psychiatric:         Behavior: Behavior normal.

## 2025-05-07 ENCOUNTER — OFFICE VISIT (OUTPATIENT)
Dept: SURGERY | Facility: CLINIC | Age: 65
End: 2025-05-07
Payer: COMMERCIAL

## 2025-05-07 VITALS
DIASTOLIC BLOOD PRESSURE: 60 MMHG | SYSTOLIC BLOOD PRESSURE: 130 MMHG | WEIGHT: 203 LBS | OXYGEN SATURATION: 99 % | TEMPERATURE: 97.5 F | BODY MASS INDEX: 35.97 KG/M2 | HEART RATE: 89 BPM | HEIGHT: 63 IN

## 2025-05-07 DIAGNOSIS — L97.909 CHRONIC CUTANEOUS VENOUS STASIS ULCER (HCC): Primary | ICD-10-CM

## 2025-05-07 DIAGNOSIS — I83.009 CHRONIC CUTANEOUS VENOUS STASIS ULCER (HCC): Primary | ICD-10-CM

## 2025-05-07 PROCEDURE — 99213 OFFICE O/P EST LOW 20 MIN: CPT | Performed by: PHYSICIAN ASSISTANT

## 2025-05-07 NOTE — ASSESSMENT & PLAN NOTE
Yellow exudate has softened, but persists over the entire wound bed. Likely represents exudate/biofilm. Doing well with xeroform, but to promote micro debridement of the exudate will transition to wet to dry gauze dressings changed daily with associated soap scrub. Questions answered, agreeable to the plan. Will see back in 2 weeks for repeat wound check, sooner as needed. Measures 9 x 7 x 0.2 cm today.

## 2025-05-11 LAB
DME PARACHUTE DELIVERY DATE ACTUAL: NORMAL
DME PARACHUTE DELIVERY DATE REQUESTED: NORMAL
DME PARACHUTE ITEM DESCRIPTION: NORMAL
DME PARACHUTE ITEM DESCRIPTION: NORMAL
DME PARACHUTE ORDER STATUS: NORMAL
DME PARACHUTE SUPPLIER NAME: NORMAL
DME PARACHUTE SUPPLIER PHONE: NORMAL

## 2025-05-21 ENCOUNTER — TELEPHONE (OUTPATIENT)
Age: 65
End: 2025-05-21

## 2025-05-21 ENCOUNTER — OFFICE VISIT (OUTPATIENT)
Dept: SURGERY | Facility: CLINIC | Age: 65
End: 2025-05-21
Payer: COMMERCIAL

## 2025-05-21 VITALS
HEART RATE: 105 BPM | OXYGEN SATURATION: 98 % | BODY MASS INDEX: 34.55 KG/M2 | WEIGHT: 195 LBS | HEIGHT: 63 IN | TEMPERATURE: 97.4 F | SYSTOLIC BLOOD PRESSURE: 158 MMHG | DIASTOLIC BLOOD PRESSURE: 60 MMHG

## 2025-05-21 DIAGNOSIS — L97.812 VENOUS STASIS ULCER OF OTHER PART OF RIGHT LOWER LEG WITH FAT LAYER EXPOSED, UNSPECIFIED WHETHER VARICOSE VEINS PRESENT (HCC): ICD-10-CM

## 2025-05-21 DIAGNOSIS — I83.018 VENOUS STASIS ULCER OF OTHER PART OF RIGHT LOWER LEG WITH FAT LAYER EXPOSED, UNSPECIFIED WHETHER VARICOSE VEINS PRESENT (HCC): ICD-10-CM

## 2025-05-21 DIAGNOSIS — L97.909 CHRONIC CUTANEOUS VENOUS STASIS ULCER (HCC): Primary | ICD-10-CM

## 2025-05-21 DIAGNOSIS — I83.009 CHRONIC CUTANEOUS VENOUS STASIS ULCER (HCC): Primary | ICD-10-CM

## 2025-05-21 DIAGNOSIS — L24.4 IRRITANT CONTACT DERMATITIS DUE TO DRUG IN CONTACT WITH SKIN: ICD-10-CM

## 2025-05-21 PROCEDURE — 99212 OFFICE O/P EST SF 10 MIN: CPT | Performed by: PHYSICIAN ASSISTANT

## 2025-05-21 NOTE — PROGRESS NOTES
Name: Martha Payan      : 1960      MRN: [id number]  Encounter Provider: Paulo Cota PA-C  Encounter Date: 2025   Encounter department: [facility] GENERAL SURGERY [place]  :  Assessment & Plan  Chronic cutaneous venous stasis ulcer (HCC)  Wound bed cleared with use of wet to dry gauze dressings. Residual fat necrosis more centrally. Of note the size of the wound has increased now measuring 10 x 9 x .3 cm. Based on appearance today will trial Maxorb gauze and leg wrap with plans to change in office Friday. Additionally will trial application of nystatin cream to RLE rash due to pruritus. Will also request second opinion on wound care by Dr. Aragon.    Orders:    Ambulatory Referral to Wound Care; Future    Irritant contact dermatitis due to drug in contact with skin    Orders:    Ambulatory Referral to Wound Care; Future    Venous stasis ulcer of other part of right lower leg with fat layer exposed, unspecified whether varicose veins present (HCC)    Orders:    Ambulatory Referral to Wound Care; Future        History of Present Illness   HPI  Martha Payan is a 65 y.o. female who presents for a wound check of right leg. Pt states the changes are painful and the wound has a foul smell. No fevers/chills. Amilia.O,MA  History obtained from: patient    Review of Systems   All other systems reviewed and are negative.    Past Medical History   Past Medical History[1]  Past Surgical History[2]  Family History[3]   reports that she has never smoked. She has never used smokeless tobacco. She reports that she does not currently use alcohol. She reports that she does not use drugs.  Current Outpatient Medications   Medication Instructions    acetaminophen (TYLENOL) 650 mg, Every 8 hours PRN    anastrozole (ARIMIDEX) 1 mg, Daily    ascorbic acid (VITAMIN C) 250 mg, Daily    aspirin 81 mg, Daily    atorvastatin (LIPITOR) 40 mg, Daily    benzonatate (TESSALON PERLES) 100 mg, Oral, Every 8 hours     "Cholecalciferol (VITAMIN D-3 PO) Take by mouth Per patient, she takes 50mg daily.    ciprofloxacin (CIPRO) 500 mg, 2 times daily    clopidogrel (PLAVIX) 75 mg, Daily    clotrimazole (LOTRIMIN) 1 % cream Apply to affected area 2 times daily for 4 weeks    FERROUS SULFATE PO 65 mg, Daily    HYDROcodone-acetaminophen (Norco) 5-325 mg per tablet 1 tablet, Oral, Every 6 hours PRN    ibuprofen (MOTRIN) 800 mg, Oral, Every 8 hours PRN    levothyroxine 50 mcg tablet 1 tablet, Daily    levothyroxine 50 mcg, Daily    methylPREDNISolone 4 MG tablet therapy pack Use as directed on package    Multiple Vitamins-Minerals (CENTRUM SILVER 50+WOMEN PO) 1 tablet, Daily    pantoprazole (PROTONIX) 40 mg, 2 times daily    sertraline (ZOLOFT) 25 mg, Daily    torsemide (DEMADEX) 10 mg, Oral, Daily    triamcinolone (KENALOG) 0.5 % cream Topical, Every other day    Verzenio 100 MG TABS 1 tablet, 2 times daily    vitamin B-12 (VITAMIN B-12) 1,000 mcg, Daily   Allergies[4]      Objective   /60 (BP Location: Left arm, Patient Position: Sitting, Cuff Size: Standard)   Pulse 105   Temp (!) 97.4 °F (36.3 °C) (Temporal)   Ht 5' 3\" (1.6 m)   Wt 88.5 kg (195 lb)   SpO2 98%   BMI 34.54 kg/m²      Physical Exam  Vitals and nursing note reviewed.   Constitutional:       General: She is not in acute distress.     Appearance: She is well-developed. She is not diaphoretic.   HENT:      Head: Normocephalic and atraumatic.     Eyes:      Conjunctiva/sclera: Conjunctivae normal.      Pupils: Pupils are equal, round, and reactive to light.     Pulmonary:      Effort: No respiratory distress.     Musculoskeletal:         General: Normal range of motion.      Cervical back: Normal range of motion.     Skin:     General: Skin is warm and dry.      Capillary Refill: Capillary refill takes less than 2 seconds.      Comments: Wound to right ankle as described above. Associated swelling and dermatitis to RLE.     Neurological:      Mental Status: She is " alert and oriented to person, place, and time.     Psychiatric:         Behavior: Behavior normal.                [1]   Past Medical History:  Diagnosis Date    Breast cancer (HCC)     Hypothyroidism     Hypothyroidism     Kidney stones    [2]   Past Surgical History:  Procedure Laterality Date    TUBAL LIGATION     [3] No family history on file.  [4]   Allergies  Allergen Reactions    Amoxicillin-Pot Clavulanate Other (See Comments)     Other Reaction(s): rash    Clindamycin Other (See Comments)     Chest tightness    Iodinated Contrast Media Other (See Comments)    Sulfa Antibiotics Other (See Comments)    Sulfamethoxazole-Trimethoprim Hives

## 2025-05-21 NOTE — TELEPHONE ENCOUNTER
"Patient was very upset having just left the office and seeing Toro Cota PA-C, for a wound on her leg. She demanded to speak with him. I did talk to Elsie in the [place] office who noted that Toro had left for the day and that I should make a phone encounter.    When I relayed to the patient that Toro had left, she became more enraged and began ranting. She stated she is not a \"cash cow\" and does not understand why she is being sent to another provider (Diaz Lanier DPM, appointment 6/3/25). Patient stated that \"you don't know what you're doing\". Her leg is not healing, it has been a year and a half, and she feels that she is being \"bounced around\". Patient is very worried about losing her leg.     The patient stated she had seen Dr. Hernandez for 1 1/2 yrs. and she didn't get healed by her, so that is why she's here. Patient then stated she is going to get a  and deya us. I attempted to tell the patient that Toro would receive her message but she disconnected the call.   "

## 2025-05-21 NOTE — ASSESSMENT & PLAN NOTE
Wound bed cleared with use of wet to dry gauze dressings. Residual fat necrosis more centrally. Of note the size of the wound has increased now measuring 10 x 9 x .3 cm. Based on appearance today will trial Maxorb gauze and leg wrap with plans to change in office Friday. Additionally will trial application of nystatin cream to RLE rash due to pruritus. Will also request second opinion on wound care by Dr. Aragon.    Orders:    Ambulatory Referral to Wound Care; Future    
/

## 2025-05-22 ENCOUNTER — TELEPHONE (OUTPATIENT)
Dept: SURGERY | Facility: CLINIC | Age: 65
End: 2025-05-22

## 2025-05-22 NOTE — TELEPHONE ENCOUNTER
Ms. Payan was seen in our office yesterday 5/21/25 in follow-up for a large complex wound on her right ankle/right lower leg. During the encounter I expressed my concern that her wound was not showing progress and suggested she consult with a specialist in the network that may have additional recommendations or may be able to offer additional therapies we could not provide for her.    A referral was then placed for a specific provider. The referral department called Ms. Payan and reviewed options for scheduling including a different site that was with her former wound care team that she no longer follows with and does not wish to see anymore.     Speaking on the phone today she expressed to me that she felt extremely frustrated by this and as if she wasn't being taken seriously or being treated appropriately. This was the reason she called back upset yesterday. Today she states she's already been in contact with her PCP and had reached out to [facility] to find a specialist, specifically for skin grafting.     She stated she would keep her follow-up appointment with me for 5/23/25. I explained that I would ensure Dr. Xiao would be available to see her with me and that we would specifically address her concerns at that time. Notified our office staff to ensure appropriate timing of her appointment tomorrow.

## 2025-05-23 ENCOUNTER — OFFICE VISIT (OUTPATIENT)
Dept: SURGERY | Facility: CLINIC | Age: 65
End: 2025-05-23
Payer: COMMERCIAL

## 2025-05-23 VITALS — TEMPERATURE: 97.2 F

## 2025-05-23 DIAGNOSIS — L97.909 CHRONIC CUTANEOUS VENOUS STASIS ULCER (HCC): Primary | ICD-10-CM

## 2025-05-23 DIAGNOSIS — I83.009 CHRONIC CUTANEOUS VENOUS STASIS ULCER (HCC): Primary | ICD-10-CM

## 2025-05-23 PROCEDURE — 99212 OFFICE O/P EST SF 10 MIN: CPT | Performed by: PHYSICIAN ASSISTANT

## 2025-05-23 NOTE — ASSESSMENT & PLAN NOTE
Here for follow-up 2 days after nystatin topical cream application to the right lower extremity and Maxorb gauze dressing to her venous stasis ulcer with full leg wrap.  No problems with the dressing since application.  States the itching significantly improved.  On exam there is some saturation of the dressing with serous drainage however notable improvement in the erythema and edema where the leg wrap and ointment was applied.  Patient evaluated by myself and Dr. Xiao in the office today who have advised repeat application of the same type dressing which was completed with her consent.  Additional bulky gauze dressing used as well as Xeroform to prevent sticking of the upper portion of her leg with the plan for her to return to the office next week for her next dressing change.  Agreeable to call sooner with any problems related to the dressing.  Additionally today her leg was wrapped from foot to knee and an Ace wrap was applied as well.  For consideration of possible skin grafting or other treatment recommendations we have made a referral to Dr. Aragon of [facility] general surgery/wound care in [place].  Patient agreeable to this consultation as well.  All questions answered to her understanding satisfaction and follow-up scheduled.

## 2025-05-27 NOTE — PROGRESS NOTES
Name: Martha Payan      : 1960      MRN: [id number]  Encounter Provider: Paulo Cota PA-C  Encounter Date: 2025   Encounter department: [facility] GENERAL SURGERY [place]  :  Assessment & Plan  Chronic cutaneous venous stasis ulcer (HCC)  64 yo F hx stage IV breast CA with bone mets stable on maintenance chemotherapy returning to the office for follow-up wound care to RLE wound and dermatitis. Dressing taken down revealing green tinged drainage without saturation. The RLE edema has improved with ACE compression. The erythema from the dermatitis is much lighter with use of Nystatin cream, there is some residual erythema at the top of the RLE. Wound itself remains wide, but shallow with scan amount of yellow slough that was gently scrubbed with soap/water. She is agreeable to continue with same dressings M/W/F as we await consultation with Dr. Aragon for possible grafting. Today the RLE was treated with Nystatin cream. Then Maxorb gauze applied to the wound and xeroform and telfa used to cover excoriated areas from prior scratch wounds. 4x4 and ABD applied for drainage then wrapped with gauze roll and ace wrap from foot to knee. Tolerated well.    Orders:    Ambulatory Referral to General Surgery; Future    Venous stasis ulcer of other part of right lower leg with fat layer exposed, unspecified whether varicose veins present (HCC)    Orders:    Ambulatory Referral to General Surgery; Future    Irritant contact dermatitis due to drug in contact with skin    Orders:    Ambulatory Referral to General Surgery; Future        History of Present Illness   HPI  Martha Payan is a 65 y.o. female who presents recheck wound   History obtained from: patient    Review of Systems   Skin:  Positive for wound.   All other systems reviewed and are negative.    Past Medical History   Past Medical History[1]  Past Surgical History[2]  Family History[3]   reports that she has never smoked. She has never  used smokeless tobacco. She reports that she does not currently use alcohol. She reports that she does not use drugs.  Current Outpatient Medications   Medication Instructions    acetaminophen (TYLENOL) 650 mg, Every 8 hours PRN    anastrozole (ARIMIDEX) 1 mg, Daily    ascorbic acid (VITAMIN C) 250 mg, Daily    aspirin 81 mg, Daily    atorvastatin (LIPITOR) 40 mg, Daily    benzonatate (TESSALON PERLES) 100 mg, Oral, Every 8 hours    Cholecalciferol (VITAMIN D-3 PO) Take by mouth Per patient, she takes 50mg daily.    ciprofloxacin (CIPRO) 500 mg, 2 times daily    clopidogrel (PLAVIX) 75 mg, Daily    clotrimazole (LOTRIMIN) 1 % cream Apply to affected area 2 times daily for 4 weeks    FERROUS SULFATE PO 65 mg, Daily    HYDROcodone-acetaminophen (Norco) 5-325 mg per tablet 1 tablet, Oral, Every 6 hours PRN    ibuprofen (MOTRIN) 800 mg, Oral, Every 8 hours PRN    levothyroxine 50 mcg tablet 1 tablet, Daily    levothyroxine 50 mcg, Daily    methylPREDNISolone 4 MG tablet therapy pack Use as directed on package    Multiple Vitamins-Minerals (CENTRUM SILVER 50+WOMEN PO) 1 tablet, Daily    pantoprazole (PROTONIX) 40 mg, 2 times daily    sertraline (ZOLOFT) 25 mg, Daily    torsemide (DEMADEX) 10 mg, Oral, Daily    triamcinolone (KENALOG) 0.5 % cream Topical, Every other day    Verzenio 100 MG TABS 1 tablet, 2 times daily    vitamin B-12 (VITAMIN B-12) 1,000 mcg, Daily   Allergies[4]      Objective   Temp 97.8 °F (36.6 °C) (Temporal)      Physical Exam  Vitals and nursing note reviewed.   Constitutional:       General: She is not in acute distress.     Appearance: She is well-developed. She is not diaphoretic.   HENT:      Head: Normocephalic and atraumatic.     Eyes:      Conjunctiva/sclera: Conjunctivae normal.      Pupils: Pupils are equal, round, and reactive to light.     Pulmonary:      Effort: No respiratory distress.     Musculoskeletal:         General: Normal range of motion.      Cervical back: Normal range of  motion.     Skin:     General: Skin is warm and dry.      Capillary Refill: Capillary refill takes less than 2 seconds.      Comments: Exam as above.  Dermatitis and edema improving.  Wound stable with mild slough.     Neurological:      Mental Status: She is alert and oriented to person, place, and time.     Psychiatric:         Behavior: Behavior normal.                [1]   Past Medical History:  Diagnosis Date    Breast cancer (HCC)     Hypothyroidism     Hypothyroidism     Kidney stones    [2]   Past Surgical History:  Procedure Laterality Date    TUBAL LIGATION     [3] No family history on file.  [4]   Allergies  Allergen Reactions    Amoxicillin-Pot Clavulanate Other (See Comments)     Other Reaction(s): rash    Clindamycin Other (See Comments)     Chest tightness    Iodinated Contrast Media Other (See Comments)    Sulfa Antibiotics Other (See Comments)    Sulfamethoxazole-Trimethoprim Hives

## 2025-05-28 ENCOUNTER — OFFICE VISIT (OUTPATIENT)
Dept: SURGERY | Facility: CLINIC | Age: 65
End: 2025-05-28
Payer: COMMERCIAL

## 2025-05-28 VITALS — TEMPERATURE: 97.8 F

## 2025-05-28 DIAGNOSIS — L24.4 IRRITANT CONTACT DERMATITIS DUE TO DRUG IN CONTACT WITH SKIN: ICD-10-CM

## 2025-05-28 DIAGNOSIS — I83.018 VENOUS STASIS ULCER OF OTHER PART OF RIGHT LOWER LEG WITH FAT LAYER EXPOSED, UNSPECIFIED WHETHER VARICOSE VEINS PRESENT (HCC): ICD-10-CM

## 2025-05-28 DIAGNOSIS — I83.009 CHRONIC CUTANEOUS VENOUS STASIS ULCER (HCC): Primary | ICD-10-CM

## 2025-05-28 DIAGNOSIS — L97.812 VENOUS STASIS ULCER OF OTHER PART OF RIGHT LOWER LEG WITH FAT LAYER EXPOSED, UNSPECIFIED WHETHER VARICOSE VEINS PRESENT (HCC): ICD-10-CM

## 2025-05-28 DIAGNOSIS — L97.909 CHRONIC CUTANEOUS VENOUS STASIS ULCER (HCC): Primary | ICD-10-CM

## 2025-05-28 PROCEDURE — 99213 OFFICE O/P EST LOW 20 MIN: CPT | Performed by: PHYSICIAN ASSISTANT

## 2025-05-28 NOTE — ASSESSMENT & PLAN NOTE
64 yo F hx stage IV breast CA with bone mets stable on maintenance chemotherapy returning to the office for follow-up wound care to RLE wound and dermatitis. Dressing taken down revealing green tinged drainage without saturation. The RLE edema has improved with ACE compression. The erythema from the dermatitis is much lighter with use of Nystatin cream, there is some residual erythema at the top of the RLE. Wound itself remains wide, but shallow with scan amount of yellow slough that was gently scrubbed with soap/water. She is agreeable to continue with same dressings M/W/F as we await consultation with Dr. Aragon for possible grafting. Today the RLE was treated with Nystatin cream. Then Maxorb gauze applied to the wound and xeroform and telfa used to cover excoriated areas from prior scratch wounds. 4x4 and ABD applied for drainage then wrapped with gauze roll and ace wrap from foot to knee. Tolerated well.    Orders:    Ambulatory Referral to General Surgery; Future

## 2025-05-29 NOTE — PROGRESS NOTES
Name: Martha Payan      : 1960      MRN: [id number]  Encounter Provider: Paulo Cota PA-C  Encounter Date: 2025   Encounter department: [facility] GENERAL SURGERY [place]  :  Assessment & Plan  Chronic cutaneous venous stasis ulcer (HCC)  5-year-old female history of metastatic breast cancer stable on maintenance chemotherapy presents for chronic nonhealing wound to the right lower extremity with associated contact dermatitis.  States that with every dressing change she gets some relief of the itching and irritation of the skin however as the dressing slides down she begins to develop new itching around the top of the wrap where there is persistent redness.  She is agreeable to continue coming in for regular wound care visits as we await consultation with Dr. Aragon scheduled for .      Wound itself stable in size with some granulation around the edges and persistent thin layer slough. Surrounding dermatitis resolved up to the level just below the knee where the ace wrap slides down and the skin becomes exposed.     Entire RLE cleaned with antibacterial soap/water. Nystatin cream applied to the entire right lower extremity.  Small amount of topical triple antibiotic ointment applied to the open wound and then excess removed.  Maxorb gauze applied to the open wound then covered with dry 4 x 4 and ABD.  Remainder of her right lower extremity covered with commendation Telfa and Xeroform dressings to prevent sticking.  Leg then wrapped with gauze roll and Ace wrap's from foot to knee and Ace wrap taped to the skin and the need to try and prevent slippage.  Tolerated the dressing change well today and agreeable to trial of the topical triple antibiotic ointment to the wound.  Will send prescription for gentamicin cream as an alternative option for the wound due to the green staining of the dressings.    Orders:    gentamicin (GARAMYCIN) 0.1 % cream; Apply topically every other day For  use at wound care visits.        History of Present Illness   HPI  Martha Payan is a 65 y.o. female who presents for a recheck wound and dressing change   History obtained from: patient    Review of Systems   All other systems reviewed and are negative.    Past Medical History   Past Medical History[1]  Past Surgical History[2]  Family History[3]   reports that she has never smoked. She has never used smokeless tobacco. She reports that she does not currently use alcohol. She reports that she does not use drugs.  Current Outpatient Medications   Medication Instructions    acetaminophen (TYLENOL) 650 mg, Every 8 hours PRN    anastrozole (ARIMIDEX) 1 mg, Daily    ascorbic acid (VITAMIN C) 250 mg, Daily    aspirin 81 mg, Daily    atorvastatin (LIPITOR) 40 mg, Daily    benzonatate (TESSALON PERLES) 100 mg, Oral, Every 8 hours    Cholecalciferol (VITAMIN D-3 PO) Take by mouth Per patient, she takes 50mg daily.    ciprofloxacin (CIPRO) 500 mg, 2 times daily    clopidogrel (PLAVIX) 75 mg, Daily    clotrimazole (LOTRIMIN) 1 % cream Apply to affected area 2 times daily for 4 weeks    FERROUS SULFATE PO 65 mg, Daily    gentamicin (GARAMYCIN) 0.1 % cream Topical, Every other day, For use at wound care visits.    HYDROcodone-acetaminophen (Norco) 5-325 mg per tablet 1 tablet, Oral, Every 6 hours PRN    ibuprofen (MOTRIN) 800 mg, Oral, Every 8 hours PRN    levothyroxine 50 mcg tablet 1 tablet, Daily    levothyroxine 50 mcg, Daily    methylPREDNISolone 4 MG tablet therapy pack Use as directed on package    Multiple Vitamins-Minerals (CENTRUM SILVER 50+WOMEN PO) 1 tablet, Daily    pantoprazole (PROTONIX) 40 mg, 2 times daily    sertraline (ZOLOFT) 25 mg, Daily    torsemide (DEMADEX) 10 mg, Oral, Daily    triamcinolone (KENALOG) 0.5 % cream Topical, Every other day    Verzenio 100 MG TABS 1 tablet, 2 times daily    vitamin B-12 (VITAMIN B-12) 1,000 mcg, Daily   Allergies[4]      Objective   Temp 98.6 °F (37 °C) (Temporal)       Physical Exam  Vitals and nursing note reviewed.   Constitutional:       General: She is not in acute distress.     Appearance: She is well-developed. She is not diaphoretic.   HENT:      Head: Normocephalic and atraumatic.     Eyes:      Conjunctiva/sclera: Conjunctivae normal.      Pupils: Pupils are equal, round, and reactive to light.     Pulmonary:      Effort: No respiratory distress.     Musculoskeletal:         General: Normal range of motion.      Cervical back: Normal range of motion.     Skin:     General: Skin is warm and dry.      Capillary Refill: Capillary refill takes less than 2 seconds.      Comments: Exam as above.  Large wound to the medial right ankle with thin layer of slough.  Moderate drainage noted on gauze dressing with green-tinged color.  Resolving dermatitis to the right lower extremity except for the top portion of the lower leg where her Ace wrap slides down.  Small superficial wound laterally on the mid leg and the scattered areas of healing excoriation related to her history of itching.     Neurological:      Mental Status: She is alert and oriented to person, place, and time.     Psychiatric:         Behavior: Behavior normal.              [1]   Past Medical History:  Diagnosis Date    Breast cancer (HCC)     Hypothyroidism     Hypothyroidism     Kidney stones    [2]   Past Surgical History:  Procedure Laterality Date    TUBAL LIGATION     [3] No family history on file.  [4]   Allergies  Allergen Reactions    Amoxicillin-Pot Clavulanate Other (See Comments)     Other Reaction(s): rash    Clindamycin Other (See Comments)     Chest tightness    Iodinated Contrast Media Other (See Comments)    Sulfa Antibiotics Other (See Comments)    Sulfamethoxazole-Trimethoprim Hives

## 2025-05-29 NOTE — PROGRESS NOTES
Name: Matrha Payan      : 1960      MRN: [id number]  Encounter Provider: Paulo Cota PA-C  Encounter Date: 2025   Encounter department: [facility] SURGERY [place]  :  Assessment & Plan  Chronic cutaneous venous stasis ulcer (HCC)  Here for follow-up 2 days after nystatin topical cream application to the right lower extremity and Maxorb gauze dressing to her venous stasis ulcer with full leg wrap.  No problems with the dressing since application.  States the itching significantly improved.  On exam there is some saturation of the dressing with serous drainage however notable improvement in the erythema and edema where the leg wrap and ointment was applied.  Patient evaluated by myself and Dr. Xiao in the office today who have advised repeat application of the same type dressing which was completed with her consent.  Additional bulky gauze dressing used as well as Xeroform to prevent sticking of the upper portion of her leg with the plan for her to return to the office next week for her next dressing change.  Agreeable to call sooner with any problems related to the dressing.  Additionally today her leg was wrapped from foot to knee and an Ace wrap was applied as well.  For consideration of possible skin grafting or other treatment recommendations we have made a referral to Dr. Aragon of [facility] surgery/wound care in [place].  Patient agreeable to this consultation as well.  All questions answered to her understanding satisfaction and follow-up scheduled.           History of Present Illness   HPI  Martha Payan is a 65 y.o. female who presents for follow-up wound check. Less itching/discomfort since last visit.  History obtained from: patient    Review of Systems   All other systems reviewed and are negative.    Past Medical History   Past Medical History[1]  Past Surgical History[2]  Family History[3]   reports that she has never smoked. She has never used  smokeless tobacco. She reports that she does not currently use alcohol. She reports that she does not use drugs.  Current Outpatient Medications   Medication Instructions    acetaminophen (TYLENOL) 650 mg, Every 8 hours PRN    anastrozole (ARIMIDEX) 1 mg, Daily    ascorbic acid (VITAMIN C) 250 mg, Daily    aspirin 81 mg, Daily    atorvastatin (LIPITOR) 40 mg, Daily    benzonatate (TESSALON PERLES) 100 mg, Oral, Every 8 hours    Cholecalciferol (VITAMIN D-3 PO) Take by mouth Per patient, she takes 50mg daily.    ciprofloxacin (CIPRO) 500 mg, 2 times daily    clopidogrel (PLAVIX) 75 mg, Daily    clotrimazole (LOTRIMIN) 1 % cream Apply to affected area 2 times daily for 4 weeks    FERROUS SULFATE PO 65 mg, Daily    HYDROcodone-acetaminophen (Norco) 5-325 mg per tablet 1 tablet, Oral, Every 6 hours PRN    ibuprofen (MOTRIN) 800 mg, Oral, Every 8 hours PRN    levothyroxine 50 mcg tablet 1 tablet, Daily    levothyroxine 50 mcg, Daily    methylPREDNISolone 4 MG tablet therapy pack Use as directed on package    Multiple Vitamins-Minerals (CENTRUM SILVER 50+WOMEN PO) 1 tablet, Daily    pantoprazole (PROTONIX) 40 mg, 2 times daily    sertraline (ZOLOFT) 25 mg, Daily    torsemide (DEMADEX) 10 mg, Oral, Daily    triamcinolone (KENALOG) 0.5 % cream Topical, Every other day    Verzenio 100 MG TABS 1 tablet, 2 times daily    vitamin B-12 (VITAMIN B-12) 1,000 mcg, Daily   Allergies[4]      Objective   Temp (!) 97.2 °F (36.2 °C) (Temporal)      Physical Exam  Vitals and nursing note reviewed.   Constitutional:       General: She is not in acute distress.     Appearance: She is well-developed. She is not diaphoretic.   HENT:      Head: Normocephalic and atraumatic.     Eyes:      Conjunctiva/sclera: Conjunctivae normal.      Pupils: Pupils are equal, round, and reactive to light.     Pulmonary:      Effort: No respiratory distress.     Musculoskeletal:         General: Normal range of motion.      Cervical back: Normal range of  motion.     Skin:     General: Skin is warm and dry.      Capillary Refill: Capillary refill takes less than 2 seconds.      Comments: Large ulcer to right ankle. Dermatitis of entire RLE improved with lighter pink coloration. Edema improved with use of ace wrap.     Neurological:      Mental Status: She is alert and oriented to person, place, and time.     Psychiatric:         Behavior: Behavior normal.              [1]   Past Medical History:  Diagnosis Date    Breast cancer (HCC)     Hypothyroidism     Hypothyroidism     Kidney stones    [2]   Past Surgical History:  Procedure Laterality Date    TUBAL LIGATION     [3] No family history on file.  [4]   Allergies  Allergen Reactions    Amoxicillin-Pot Clavulanate Other (See Comments)     Other Reaction(s): rash    Clindamycin Other (See Comments)     Chest tightness    Iodinated Contrast Media Other (See Comments)    Sulfa Antibiotics Other (See Comments)    Sulfamethoxazole-Trimethoprim Hives

## 2025-05-30 ENCOUNTER — OFFICE VISIT (OUTPATIENT)
Dept: SURGERY | Facility: CLINIC | Age: 65
End: 2025-05-30
Payer: COMMERCIAL

## 2025-05-30 VITALS — TEMPERATURE: 98.6 F

## 2025-05-30 DIAGNOSIS — L97.909 CHRONIC CUTANEOUS VENOUS STASIS ULCER (HCC): Primary | ICD-10-CM

## 2025-05-30 DIAGNOSIS — I83.009 CHRONIC CUTANEOUS VENOUS STASIS ULCER (HCC): Primary | ICD-10-CM

## 2025-05-30 PROCEDURE — 99212 OFFICE O/P EST SF 10 MIN: CPT | Performed by: PHYSICIAN ASSISTANT

## 2025-05-30 RX ORDER — GENTAMICIN SULFATE 1 MG/G
CREAM TOPICAL EVERY OTHER DAY
Qty: 15 G | Refills: 0 | Status: SHIPPED | OUTPATIENT
Start: 2025-05-30 | End: 2025-06-02 | Stop reason: SDUPTHER

## 2025-05-30 RX ORDER — GENTAMICIN SULFATE 1 MG/G
OINTMENT TOPICAL EVERY OTHER DAY
Qty: 15 G | Refills: 0 | Status: CANCELLED | OUTPATIENT
Start: 2025-05-30

## 2025-05-30 NOTE — ASSESSMENT & PLAN NOTE
5-year-old female history of metastatic breast cancer stable on maintenance chemotherapy presents for chronic nonhealing wound to the right lower extremity with associated contact dermatitis.  States that with every dressing change she gets some relief of the itching and irritation of the skin however as the dressing slides down she begins to develop new itching around the top of the wrap where there is persistent redness.  She is agreeable to continue coming in for regular wound care visits as we await consultation with Dr. Aragon scheduled for June 19.      Wound itself stable in size with some granulation around the edges and persistent thin layer slough. Surrounding dermatitis resolved up to the level just below the knee where the ace wrap slides down and the skin becomes exposed.     Entire RLE cleaned with antibacterial soap/water. Nystatin cream applied to the entire right lower extremity.  Small amount of topical triple antibiotic ointment applied to the open wound and then excess removed.  Maxorb gauze applied to the open wound then covered with dry 4 x 4 and ABD.  Remainder of her right lower extremity covered with commendation Telfa and Xeroform dressings to prevent sticking.  Leg then wrapped with gauze roll and Ace wrap's from foot to knee and Ace wrap taped to the skin and the need to try and prevent slippage.  Tolerated the dressing change well today and agreeable to trial of the topical triple antibiotic ointment to the wound.  Will send prescription for gentamicin cream as an alternative option for the wound due to the green staining of the dressings.    Orders:    gentamicin (GARAMYCIN) 0.1 % cream; Apply topically every other day For use at wound care visits.

## 2025-06-02 ENCOUNTER — OFFICE VISIT (OUTPATIENT)
Dept: SURGERY | Facility: CLINIC | Age: 65
End: 2025-06-02
Payer: COMMERCIAL

## 2025-06-02 VITALS
HEIGHT: 63 IN | TEMPERATURE: 97.8 F | BODY MASS INDEX: 34.38 KG/M2 | WEIGHT: 194 LBS | DIASTOLIC BLOOD PRESSURE: 60 MMHG | HEART RATE: 118 BPM | SYSTOLIC BLOOD PRESSURE: 114 MMHG | OXYGEN SATURATION: 90 %

## 2025-06-02 DIAGNOSIS — L24.4 IRRITANT CONTACT DERMATITIS DUE TO DRUG IN CONTACT WITH SKIN: Primary | ICD-10-CM

## 2025-06-02 DIAGNOSIS — I83.009 CHRONIC CUTANEOUS VENOUS STASIS ULCER (HCC): ICD-10-CM

## 2025-06-02 DIAGNOSIS — L97.909 CHRONIC CUTANEOUS VENOUS STASIS ULCER (HCC): ICD-10-CM

## 2025-06-02 PROCEDURE — 99212 OFFICE O/P EST SF 10 MIN: CPT | Performed by: PHYSICIAN ASSISTANT

## 2025-06-02 RX ORDER — NYSTATIN 100000 U/G
CREAM TOPICAL EVERY OTHER DAY
Qty: 30 G | Refills: 0 | Status: SHIPPED | OUTPATIENT
Start: 2025-06-02 | End: 2025-06-04 | Stop reason: SDUPTHER

## 2025-06-02 RX ORDER — GENTAMICIN SULFATE 1 MG/G
CREAM TOPICAL EVERY OTHER DAY
Qty: 30 G | Refills: 0 | Status: SHIPPED | OUTPATIENT
Start: 2025-06-02

## 2025-06-02 NOTE — PROGRESS NOTES
Name: Martha Payan      : 1960      MRN: [id number]  Encounter Provider: Paulo Cota PA-C  Encounter Date: 2025   Encounter department: [facility] GENERAL SURGERY [place]  :  Assessment & Plan  Irritant contact dermatitis due to drug in contact with skin    Orders:    nystatin (MYCOSTATIN) cream; Apply topically every other day    Chronic cutaneous venous stasis ulcer (HCC)  Dermatitis resolving to RLE except for excoriated area at top of leg wrap. Wound with slough from being wrapped x 3 days that was removed with gentle soap scrub. Nystatin applied to RLE, triple antibiotic ointment applied to wound bed. Wound covered with Maxorb Ag and bulky dry gauze/ABD, RLE covered with telfa and a piece of xeroform at the top of her leg where it is still excoriated. Leg wrapped with Kerlix and ACE x 2 from foot to knee. See photo for update. Will continue MWF schedule. Rx sent to pharmacy for trial of gentamicin cream to wound due to green discoloration and suspected pseudomonas growth as well as for nystatin cream being used on her leg. Agreeable to continue current wound care plan, will consult with Dr. Xiao as needed pending progress as we approach consultation with Dr. Aragon on .        Orders:    gentamicin (GARAMYCIN) 0.1 % cream; Apply topically every other day        History of Present Illness   HPI  Martha Payan is a 65 y.o. female who presents for a wound check of right leg. Pt states this morning she was suffering from a lot of pain but the pain has calm down. No fevers/chills. Amilia.O,MA  History obtained from: patient    Review of Systems   Skin:  Positive for wound.   All other systems reviewed and are negative.    Past Medical History   Past Medical History[1]  Past Surgical History[2]  Family History[3]   reports that she has never smoked. She has never used smokeless tobacco. She reports that she does not currently use alcohol. She reports that she does not use  "drugs.  Current Outpatient Medications   Medication Instructions    acetaminophen (TYLENOL) 650 mg, Every 8 hours PRN    anastrozole (ARIMIDEX) 1 mg, Daily    ascorbic acid (VITAMIN C) 250 mg, Daily    aspirin 81 mg, Daily    atorvastatin (LIPITOR) 40 mg, Daily    benzonatate (TESSALON PERLES) 100 mg, Oral, Every 8 hours    Cholecalciferol (VITAMIN D-3 PO) Take by mouth Per patient, she takes 50mg daily.    ciprofloxacin (CIPRO) 500 mg, 2 times daily    clopidogrel (PLAVIX) 75 mg, Daily    clotrimazole (LOTRIMIN) 1 % cream Apply to affected area 2 times daily for 4 weeks    FERROUS SULFATE PO 65 mg, Daily    gentamicin (GARAMYCIN) 0.1 % cream Topical, Every other day    HYDROcodone-acetaminophen (Norco) 5-325 mg per tablet 1 tablet, Oral, Every 6 hours PRN    ibuprofen (MOTRIN) 800 mg, Oral, Every 8 hours PRN    levothyroxine 50 mcg tablet 1 tablet, Daily    levothyroxine 50 mcg, Daily    methylPREDNISolone 4 MG tablet therapy pack Use as directed on package    Multiple Vitamins-Minerals (CENTRUM SILVER 50+WOMEN PO) 1 tablet, Daily    nystatin (MYCOSTATIN) cream Topical, Every other day    pantoprazole (PROTONIX) 40 mg, 2 times daily    sertraline (ZOLOFT) 25 mg, Daily    torsemide (DEMADEX) 10 mg, Oral, Daily    triamcinolone (KENALOG) 0.5 % cream Topical, Every other day    Verzenio 100 MG TABS 1 tablet, 2 times daily    vitamin B-12 (VITAMIN B-12) 1,000 mcg, Daily   Allergies[4]      Objective   /60 (BP Location: Left arm, Patient Position: Sitting, Cuff Size: Standard)   Pulse (!) 118   Temp 97.8 °F (36.6 °C) (Temporal)   Ht 5' 3\" (1.6 m)   Wt 88 kg (194 lb)   SpO2 90%   BMI 34.37 kg/m²      Physical Exam  Vitals and nursing note reviewed.   Constitutional:       General: She is not in acute distress.     Appearance: She is well-developed. She is not diaphoretic.   HENT:      Head: Normocephalic and atraumatic.     Eyes:      Conjunctiva/sclera: Conjunctivae normal.      Pupils: Pupils are equal, " round, and reactive to light.     Pulmonary:      Effort: No respiratory distress.     Musculoskeletal:         General: Normal range of motion.      Cervical back: Normal range of motion.     Skin:     General: Skin is warm and dry.      Capillary Refill: Capillary refill takes less than 2 seconds.      Comments: Exam as above. RLE dermatitis only remains at upper leg below knee where ace wrap exposes this area. Wound to right ankle stable in size with yellow slough and green drainage that cleans easily with antibacterial soap scrub. Small sub centimeter partial thickness wound laterally mid leg. No signs of cellulitis, abscess, tunneling or sinus.     Neurological:      Mental Status: She is alert and oriented to person, place, and time.     Psychiatric:         Behavior: Behavior normal.                [1]   Past Medical History:  Diagnosis Date    Breast cancer (HCC)     Hypothyroidism     Hypothyroidism     Kidney stones    [2]   Past Surgical History:  Procedure Laterality Date    TUBAL LIGATION     [3] No family history on file.  [4]   Allergies  Allergen Reactions    Amoxicillin-Pot Clavulanate Other (See Comments)     Other Reaction(s): rash    Clindamycin Other (See Comments)     Chest tightness    Iodinated Contrast Media Other (See Comments)    Sulfa Antibiotics Other (See Comments)    Sulfamethoxazole-Trimethoprim Hives

## 2025-06-02 NOTE — ASSESSMENT & PLAN NOTE
Dermatitis resolving to RLE except for excoriated area at top of leg wrap. Wound with slough from being wrapped x 3 days that was removed with gentle soap scrub. Nystatin applied to RLE, triple antibiotic ointment applied to wound bed. Wound covered with Maxorb Ag and bulky dry gauze/ABD, RLE covered with telfa and a piece of xeroform at the top of her leg where it is still excoriated. Leg wrapped with Kerlix and ACE x 2 from foot to knee. See photo for update. Will continue MWF schedule. Rx sent to pharmacy for trial of gentamicin cream to wound due to green discoloration and suspected pseudomonas growth as well as for nystatin cream being used on her leg. Agreeable to continue current wound care plan, will consult with Dr. Xiao as needed pending progress as we approach consultation with Dr. Aragon on 6/19.        Orders:    gentamicin (GARAMYCIN) 0.1 % cream; Apply topically every other day

## 2025-06-04 ENCOUNTER — OFFICE VISIT (OUTPATIENT)
Dept: SURGERY | Facility: CLINIC | Age: 65
End: 2025-06-04
Payer: COMMERCIAL

## 2025-06-04 VITALS
WEIGHT: 194 LBS | BODY MASS INDEX: 34.38 KG/M2 | SYSTOLIC BLOOD PRESSURE: 128 MMHG | DIASTOLIC BLOOD PRESSURE: 60 MMHG | HEIGHT: 63 IN | TEMPERATURE: 98.6 F | OXYGEN SATURATION: 90 % | HEART RATE: 120 BPM

## 2025-06-04 DIAGNOSIS — L24.4 IRRITANT CONTACT DERMATITIS DUE TO DRUG IN CONTACT WITH SKIN: ICD-10-CM

## 2025-06-04 DIAGNOSIS — B36.9 FUNGAL DERMATITIS: ICD-10-CM

## 2025-06-04 DIAGNOSIS — I83.009 CHRONIC CUTANEOUS VENOUS STASIS ULCER (HCC): Primary | ICD-10-CM

## 2025-06-04 DIAGNOSIS — L97.909 CHRONIC CUTANEOUS VENOUS STASIS ULCER (HCC): Primary | ICD-10-CM

## 2025-06-04 PROCEDURE — 99212 OFFICE O/P EST SF 10 MIN: CPT | Performed by: PHYSICIAN ASSISTANT

## 2025-06-04 RX ORDER — NYSTATIN 100000 U/G
CREAM TOPICAL EVERY OTHER DAY
Qty: 30 G | Refills: 0 | Status: SHIPPED | OUTPATIENT
Start: 2025-06-04

## 2025-06-04 RX ORDER — LIDOCAINE AND PRILOCAINE 25; 25 MG/G; MG/G
CREAM TOPICAL AS NEEDED
Qty: 30 G | Refills: 0 | Status: SHIPPED | OUTPATIENT
Start: 2025-06-04

## 2025-06-04 NOTE — PROGRESS NOTES
Name: Martha Payan      : 1960      MRN: [id number]  Encounter Provider: Paulo Cota PA-C  Encounter Date: 2025   Encounter department: [facility] GENERAL SURGERY [place]  :  Assessment & Plan  Chronic cutaneous venous stasis ulcer (HCC)  Presents visibly upset. Has been fighting with her pharmacy to obtain her normal prescriptions. Also feels as though she's fighting a cold and has low energy. Ongoing pain to the wound of the right leg, no other changes.    On exam the dermatitis is stable, edema controlled, wound with thin yellow slough, but hypersensitive to touch. Wound edges clean and well demarcated. Will continue current treatment plan with nystatin cream to RLE, triple antibiotic ointment to wound bed, maxorb gauze covered with xeroform to open wound, telfa to remainder of RLE due to numerous small excoriations, then 4x4, ABD, kerlix, cling, and ace from foot to knee.     Rx sent to pharmacy for EMLA cream to assist with pain and Nystatin cream for her leg rash. Will continue visits M/W/F up until she can be seen by Dr. Aragon on  in consultation. Patient made aware no provider available on  and she states she will be prepared to change her dressing at home that day, will make sure she has the necessary supplies ahead of this.    Questions answered, patient agreeable to plan.    Orders:    lidocaine-prilocaine (EMLA) cream; Apply topically as needed for mild pain    Fungal dermatitis    Orders:    nystatin (MYCOSTATIN) cream; Apply topically every other day        History of Present Illness   HPI  Martha Payan is a 65 y.o. female who presents for a wound check of right leg. Pt reports a lot of pain and exhaustion. No fevers/chills. Amilia.O,MA  History obtained from: patient    Review of Systems   All other systems reviewed and are negative.    Past Medical History   Past Medical History[1]  Past Surgical History[2]  Family History[3]   reports that she has never  Return to office in 3 week(s) for prenatal care and as needed.    If you think your bag of water is broke; have bleeding like a period; think your in labor; or are worried about your baby's movement; please call the labor and delivery unit @ [phone].    Thank you for allowing Jordan MCCLAIN CNM and our OB team to participate in your care.  If you have a scheduling or an appointment question please contact [facility] Health Unit Coordinator at their direct line [phone].   ALL nursing questions or concerns can be directed to your OB nurse at: [phone] Kirill Stauffer/Donna            "smoked. She has never used smokeless tobacco. She reports that she does not currently use alcohol. She reports that she does not use drugs.  Current Outpatient Medications   Medication Instructions    acetaminophen (TYLENOL) 650 mg, Every 8 hours PRN    anastrozole (ARIMIDEX) 1 mg, Oral, Daily    ascorbic acid (VITAMIN C) 250 mg, Oral, Daily    aspirin 81 mg, Oral, Daily    atorvastatin (LIPITOR) 40 mg, Oral, Daily    benzonatate (TESSALON PERLES) 100 mg, Oral, Every 8 hours    Cholecalciferol (VITAMIN D-3 PO) Oral, Per patient, she takes 50mg daily.     ciprofloxacin (CIPRO) 500 mg, 2 times daily    clopidogrel (PLAVIX) 75 mg, Oral, Daily    clotrimazole (LOTRIMIN) 1 % cream Apply to affected area 2 times daily for 4 weeks    FERROUS SULFATE PO 65 mg, Oral, Daily, As per GI doctor recommendation    gentamicin (GARAMYCIN) 0.1 % cream Topical, Every other day    HYDROcodone-acetaminophen (Norco) 5-325 mg per tablet 1 tablet, Oral, Every 6 hours PRN    ibuprofen (MOTRIN) 800 mg, Oral, Every 8 hours PRN    levothyroxine 50 mcg tablet 1 tablet, Oral, Daily    levothyroxine 50 mcg, Daily    lidocaine-prilocaine (EMLA) cream Topical, As needed    methylPREDNISolone 4 MG tablet therapy pack Use as directed on package    Multiple Vitamins-Minerals (CENTRUM SILVER 50+WOMEN PO) 1 tablet, Daily    nystatin (MYCOSTATIN) cream Topical, Every other day    pantoprazole (PROTONIX) 40 mg, Oral, 2 times daily    sertraline (ZOLOFT) 25 mg, Daily    torsemide (DEMADEX) 10 mg, Oral, Daily    triamcinolone (KENALOG) 0.5 % cream Topical, Every other day    Verzenio 100 MG TABS 1 tablet, Oral, 2 times daily    vitamin B-12 (VITAMIN B-12) 1,000 mcg, Oral, Daily   Allergies[4]      Objective   /60 (BP Location: Left arm, Patient Position: Sitting, Cuff Size: Standard)   Pulse (!) 120   Temp 98.6 °F (37 °C) (Temporal)   Ht 5' 3\" (1.6 m)   Wt 88 kg (194 lb)   SpO2 90%   BMI 34.37 kg/m²      Physical Exam  Vitals and nursing note " reviewed.   Constitutional:       General: She is not in acute distress.     Appearance: She is well-developed. She is not diaphoretic.   HENT:      Head: Normocephalic and atraumatic.     Eyes:      Conjunctiva/sclera: Conjunctivae normal.      Pupils: Pupils are equal, round, and reactive to light.     Pulmonary:      Effort: No respiratory distress.     Musculoskeletal:         General: Normal range of motion.      Cervical back: Normal range of motion.     Skin:     General: Skin is warm and dry.      Capillary Refill: Capillary refill takes less than 2 seconds.     Neurological:      Mental Status: She is alert and oriented to person, place, and time.     Psychiatric:         Behavior: Behavior normal.                [1]   Past Medical History:  Diagnosis Date    Breast cancer (HCC)     Hypothyroidism     Hypothyroidism     Kidney stones    [2]   Past Surgical History:  Procedure Laterality Date    TUBAL LIGATION     [3] No family history on file.  [4]   Allergies  Allergen Reactions    Amoxicillin-Pot Clavulanate Other (See Comments)     Other Reaction(s): rash    Clindamycin Other (See Comments)     Chest tightness    Iodinated Contrast Media Other (See Comments)    Sulfa Antibiotics Other (See Comments)    Sulfamethoxazole-Trimethoprim Hives

## 2025-06-04 NOTE — ASSESSMENT & PLAN NOTE
Presents visibly upset. Has been fighting with her pharmacy to obtain her normal prescriptions. Also feels as though she's fighting a cold and has low energy. Ongoing pain to the wound of the right leg, no other changes.    On exam the dermatitis is stable, edema controlled, wound with thin yellow slough, but hypersensitive to touch. Wound edges clean and well demarcated. Will continue current treatment plan with nystatin cream to RLE, triple antibiotic ointment to wound bed, maxorb gauze covered with xeroform to open wound, telfa to remainder of RLE due to numerous small excoriations, then 4x4, ABD, kerlix, cling, and ace from foot to knee.     Rx sent to pharmacy for EMLA cream to assist with pain and Nystatin cream for her leg rash. Will continue visits M/W/F up until she can be seen by Dr. Aragon on 6/19 in consultation. Patient made aware no provider available on 6/13 and she states she will be prepared to change her dressing at home that day, will make sure she has the necessary supplies ahead of this.    Questions answered, patient agreeable to plan.    Orders:    lidocaine-prilocaine (EMLA) cream; Apply topically as needed for mild pain

## 2025-06-05 NOTE — PROGRESS NOTES
Name: Martha Payan      : 1960      MRN: [id number]  Encounter Provider: Paulo Cota PA-C  Encounter Date: 2025   Encounter department: [facility] GENERAL SURGERY [place]  :  Assessment & Plan  Chronic cutaneous venous stasis ulcer (HCC)  She replaced the dressing yesterday due to itching and need to reapply nystatin ointment to the RLE and used Neosporin to the wound. On exam today the leg is wrapped from foot to mid leg with ace and a dry 4x4 covering the wound. The skin of the RLE is healing well, no edema, erythema, multiple healing old excoriations and chronic pigment changes. The ulcer is stable in size with well defined edges and mild slough in the wound bed. The Entire RLE cleaned with antibacterial soap scrub and patted dry. Triple antibiotic ointment applied to wound bed and excess dabbed off. Thin layer of zinc oxide applied to raj-wound to protect from discharge. Nystatin cream applied to remainder of RLE. Wound covered with Maxorb gauze then Xeroform gauze then 4x4 and ABD. Remainder of leg covered with Telfa to protect sticking to old excoriation sites. Leg wrapped with kerlix then ace x 2 from foot to knee. Tolerated well. No signs of infection.    Will repeat process Wednesday and provide supplies so she can perform for herself on Friday as no appointment are available. Next week will see us M/W then has consult with Dr. Aragon on . Reviewed plan with patient who is agreeable.             History of Present Illness   HPI  Martha Payan is a 65 y.o. female who presents Wound check   History obtained from: patient    Review of Systems   All other systems reviewed and are negative.    Past Medical History   Past Medical History[1]  Past Surgical History[2]  Family History[3]   reports that she has never smoked. She has never used smokeless tobacco. She reports that she does not currently use alcohol. She reports that she does not use drugs.  Current Outpatient  Medications   Medication Instructions    acetaminophen (TYLENOL) 650 mg, Every 8 hours PRN    anastrozole (ARIMIDEX) 1 mg, Oral, Daily    ascorbic acid (VITAMIN C) 250 mg, Oral, Daily    aspirin 81 mg, Oral, Daily    atorvastatin (LIPITOR) 40 mg, Oral, Daily    benzonatate (TESSALON PERLES) 100 mg, Oral, Every 8 hours    Cholecalciferol (VITAMIN D-3 PO) Oral, Per patient, she takes 50mg daily.     ciprofloxacin (CIPRO) 500 mg, 2 times daily    clopidogrel (PLAVIX) 75 mg, Oral, Daily    clotrimazole (LOTRIMIN) 1 % cream Apply to affected area 2 times daily for 4 weeks    FERROUS SULFATE PO 65 mg, Oral, Daily, As per GI doctor recommendation    gentamicin (GARAMYCIN) 0.1 % cream Topical, Every other day    HYDROcodone-acetaminophen (Norco) 5-325 mg per tablet 1 tablet, Oral, Every 6 hours PRN    ibuprofen (MOTRIN) 800 mg, Oral, Every 8 hours PRN    levothyroxine 50 mcg tablet 1 tablet, Oral, Daily    levothyroxine 50 mcg, Daily    lidocaine-prilocaine (EMLA) cream Topical, As needed    methylPREDNISolone 4 MG tablet therapy pack Use as directed on package    Multiple Vitamins-Minerals (CENTRUM SILVER 50+WOMEN PO) 1 tablet, Daily    nystatin (MYCOSTATIN) cream Topical, Every other day    pantoprazole (PROTONIX) 40 mg, Oral, 2 times daily    sertraline (ZOLOFT) 25 mg, Daily    torsemide (DEMADEX) 10 mg, Oral, Daily    triamcinolone (KENALOG) 0.5 % cream Topical, Every other day    Verzenio 100 MG TABS 1 tablet, Oral, 2 times daily    vitamin B-12 (VITAMIN B-12) 1,000 mcg, Oral, Daily   Allergies[4]      Objective   Temp (!) 96.4 °F (35.8 °C) (Temporal)      Physical Exam  Vitals and nursing note reviewed.   Constitutional:       General: She is not in acute distress.     Appearance: She is well-developed. She is not diaphoretic.   HENT:      Head: Normocephalic and atraumatic.     Eyes:      Conjunctiva/sclera: Conjunctivae normal.      Pupils: Pupils are equal, round, and reactive to light.     Pulmonary:      Effort:  No respiratory distress.     Musculoskeletal:         General: Normal range of motion.      Cervical back: Normal range of motion.     Skin:     General: Skin is warm and dry.      Capillary Refill: Capillary refill takes less than 2 seconds.      Comments: RLE ulcer as described in A&P.     Neurological:      Mental Status: She is alert and oriented to person, place, and time.     Psychiatric:         Behavior: Behavior normal.                [1]   Past Medical History:  Diagnosis Date    Breast cancer (HCC)     Hypothyroidism     Hypothyroidism     Kidney stones    [2]   Past Surgical History:  Procedure Laterality Date    TUBAL LIGATION     [3] No family history on file.  [4]   Allergies  Allergen Reactions    Amoxicillin-Pot Clavulanate Other (See Comments)     Other Reaction(s): rash    Clindamycin Other (See Comments)     Chest tightness    Iodinated Contrast Media Other (See Comments)    Sulfa Antibiotics Other (See Comments)    Sulfamethoxazole-Trimethoprim Hives

## 2025-06-06 ENCOUNTER — OFFICE VISIT (OUTPATIENT)
Dept: SURGERY | Facility: CLINIC | Age: 65
End: 2025-06-06
Payer: COMMERCIAL

## 2025-06-06 ENCOUNTER — TELEPHONE (OUTPATIENT)
Age: 65
End: 2025-06-06

## 2025-06-06 ENCOUNTER — TELEPHONE (OUTPATIENT)
Dept: SURGERY | Facility: CLINIC | Age: 65
End: 2025-06-06

## 2025-06-06 VITALS
SYSTOLIC BLOOD PRESSURE: 142 MMHG | BODY MASS INDEX: 34.38 KG/M2 | WEIGHT: 194 LBS | DIASTOLIC BLOOD PRESSURE: 80 MMHG | HEART RATE: 102 BPM | TEMPERATURE: 98.5 F | HEIGHT: 63 IN | OXYGEN SATURATION: 91 %

## 2025-06-06 DIAGNOSIS — I83.009 CHRONIC CUTANEOUS VENOUS STASIS ULCER (HCC): Primary | ICD-10-CM

## 2025-06-06 DIAGNOSIS — R05.8 PRODUCTIVE COUGH: ICD-10-CM

## 2025-06-06 DIAGNOSIS — L97.909 CHRONIC CUTANEOUS VENOUS STASIS ULCER (HCC): Primary | ICD-10-CM

## 2025-06-06 PROCEDURE — 99212 OFFICE O/P EST SF 10 MIN: CPT | Performed by: PHYSICIAN ASSISTANT

## 2025-06-06 NOTE — TELEPHONE ENCOUNTER
PT called because she received a voicemail from and does not know what it was about-she claims it was about dental but she has not dental appts according to the pt. It might have been her PCP, -warm transfer to [facility] PCP office

## 2025-06-06 NOTE — TELEPHONE ENCOUNTER
Called patient's pcp provider Dr. Daniel and made office aware we sent Martha to urgent care today due to having SOB, low oxygen levels and a cough with phlegm. Staff member states she will write a clinical note and they will reach out to make an appt with her.

## 2025-06-06 NOTE — ASSESSMENT & PLAN NOTE
Reports feeling ill. Sick with upper respiratory infection. Has congestion, productive cough, and some exertional dyspnea. In office she has unlabored breathing. At rest SpO2 noted at 91% and with deep breathing rebounds to 96%. I advised she proceed directly to the local Urgent Care or ER for further evaluation. She's agreeable to go to Urgent care and insists she feels fine to ambulate and drive there. We will also notify her PCP office. Reviewed symptoms that should prompt her to call 911. Questions answered, agreeable to the plan.

## 2025-06-06 NOTE — PROGRESS NOTES
Name: Martha Payan      : 1960      MRN: [id number]  Encounter Provider: Paulo Cota PA-C  Encounter Date: 2025   Encounter department: [facility] SURGERY [place]  :  Assessment & Plan  Chronic cutaneous venous stasis ulcer (HCC)  RLE remains stable. Edema controlled with ACE wraps. Erythema resolved with use of Nystatin cream. Wound stable with lighter discharge noted today and stable thin slough and clean wound edges. After full RLE soap scrub Nystatin applied to RLE and triple antibiotic to the wound then wiped off. EMLA cream applied to wound then wound covered with Maxorb Ag gauze then Xeroform gauze. Exposed skin covered with Telfa, bulky 4x4, ABD then wrapped with kerlix then ace x 2. Continue MWF schedule as we await consult with Dr. Aragon regarding indications for skin grafting.         Productive cough  Reports feeling ill. Sick with upper respiratory infection. Has congestion, productive cough, and some exertional dyspnea. In office she has unlabored breathing. At rest SpO2 noted at 91% and with deep breathing rebounds to 96%. I advised she proceed directly to the local Urgent Care or ER for further evaluation. She's agreeable to go to Urgent care and insists she feels fine to ambulate and drive there. We will also notify her PCP office. Reviewed symptoms that should prompt her to call 911. Questions answered, agreeable to the plan.             History of Present Illness   HPI  Martha Payan is a 65 y.o. female who presents for a follow up for a wound check of the right lower leg. Pt states she is to tired to tell me the level of pain she is having. Pt reports a cough with phlegm. Pt denies chest pain, she has  bit of shortness of breath. Amilia.O,MA  History obtained from: patient    Review of Systems   Respiratory:  Positive for cough and shortness of breath.    All other systems reviewed and are negative.    Past Medical History   Past Medical History[1]  Past  "Surgical History[2]  Family History[3]   reports that she has never smoked. She has never used smokeless tobacco. She reports that she does not currently use alcohol. She reports that she does not use drugs.  Current Outpatient Medications   Medication Instructions    acetaminophen (TYLENOL) 650 mg, Every 8 hours PRN    anastrozole (ARIMIDEX) 1 mg, Oral, Daily    ascorbic acid (VITAMIN C) 250 mg, Oral, Daily    aspirin 81 mg, Oral, Daily    atorvastatin (LIPITOR) 40 mg, Oral, Daily    benzonatate (TESSALON PERLES) 100 mg, Oral, Every 8 hours    Cholecalciferol (VITAMIN D-3 PO) Oral, Per patient, she takes 50mg daily.     ciprofloxacin (CIPRO) 500 mg, 2 times daily    clopidogrel (PLAVIX) 75 mg, Oral, Daily    clotrimazole (LOTRIMIN) 1 % cream Apply to affected area 2 times daily for 4 weeks    FERROUS SULFATE PO 65 mg, Oral, Daily, As per GI doctor recommendation    gentamicin (GARAMYCIN) 0.1 % cream Topical, Every other day    HYDROcodone-acetaminophen (Norco) 5-325 mg per tablet 1 tablet, Oral, Every 6 hours PRN    ibuprofen (MOTRIN) 800 mg, Oral, Every 8 hours PRN    levothyroxine 50 mcg tablet 1 tablet, Oral, Daily    levothyroxine 50 mcg, Daily    lidocaine-prilocaine (EMLA) cream Topical, As needed    methylPREDNISolone 4 MG tablet therapy pack Use as directed on package    Multiple Vitamins-Minerals (CENTRUM SILVER 50+WOMEN PO) 1 tablet, Daily    nystatin (MYCOSTATIN) cream Topical, Every other day    pantoprazole (PROTONIX) 40 mg, Oral, 2 times daily    sertraline (ZOLOFT) 25 mg, Daily    torsemide (DEMADEX) 10 mg, Oral, Daily    triamcinolone (KENALOG) 0.5 % cream Topical, Every other day    Verzenio 100 MG TABS 1 tablet, Oral, 2 times daily    vitamin B-12 (VITAMIN B-12) 1,000 mcg, Oral, Daily   Allergies[4]      Objective   /80 (BP Location: Left arm, Patient Position: Sitting, Cuff Size: Standard)   Pulse 102   Temp 98.5 °F (36.9 °C) (Temporal)   Ht 5' 3\" (1.6 m)   Wt 88 kg (194 lb)   SpO2 91% "   BMI 34.37 kg/m²      Physical Exam  Vitals and nursing note reviewed.   Constitutional:       General: She is not in acute distress.     Appearance: She is well-developed. She is not diaphoretic.   HENT:      Head: Normocephalic and atraumatic.     Eyes:      Conjunctiva/sclera: Conjunctivae normal.      Pupils: Pupils are equal, round, and reactive to light.     Pulmonary:      Effort: No respiratory distress.      Breath sounds: No wheezing.     Musculoskeletal:         General: Normal range of motion.      Cervical back: Normal range of motion.     Skin:     General: Skin is warm and dry.      Capillary Refill: Capillary refill takes less than 2 seconds.      Comments: RLE without edema or dermatitis/cellulitis. Stable right ankle wound with mild/moderate yellow serous drainage on dressing, no green coloration. Thin layer of slough. Clean wound edges.     Neurological:      Mental Status: She is alert and oriented to person, place, and time.     Psychiatric:         Behavior: Behavior normal.              [1]   Past Medical History:  Diagnosis Date    Breast cancer (HCC)     Hypothyroidism     Hypothyroidism     Kidney stones    [2]   Past Surgical History:  Procedure Laterality Date    TUBAL LIGATION     [3] No family history on file.  [4]   Allergies  Allergen Reactions    Amoxicillin-Pot Clavulanate Other (See Comments)     Other Reaction(s): rash    Clindamycin Other (See Comments)     Chest tightness    Iodinated Contrast Media Other (See Comments)    Sulfa Antibiotics Other (See Comments)    Sulfamethoxazole-Trimethoprim Hives

## 2025-06-06 NOTE — ASSESSMENT & PLAN NOTE
RLE remains stable. Edema controlled with ACE wraps. Erythema resolved with use of Nystatin cream. Wound stable with lighter discharge noted today and stable thin slough and clean wound edges. After full RLE soap scrub Nystatin applied to RLE and triple antibiotic to the wound then wiped off. EMLA cream applied to wound then wound covered with Maxorb Ag gauze then Xeroform gauze. Exposed skin covered with Telfa, bulky 4x4, ABD then wrapped with kerlix then ace x 2. Continue MWF schedule as we await consult with Dr. Aragon regarding indications for skin grafting.

## 2025-06-09 ENCOUNTER — OFFICE VISIT (OUTPATIENT)
Dept: SURGERY | Facility: CLINIC | Age: 65
End: 2025-06-09
Attending: PHYSICIAN ASSISTANT
Payer: COMMERCIAL

## 2025-06-09 VITALS — TEMPERATURE: 96.4 F

## 2025-06-09 DIAGNOSIS — L97.909 CHRONIC CUTANEOUS VENOUS STASIS ULCER (HCC): Primary | ICD-10-CM

## 2025-06-09 DIAGNOSIS — I83.009 CHRONIC CUTANEOUS VENOUS STASIS ULCER (HCC): Primary | ICD-10-CM

## 2025-06-09 PROCEDURE — 99212 OFFICE O/P EST SF 10 MIN: CPT | Performed by: PHYSICIAN ASSISTANT

## 2025-06-09 NOTE — ASSESSMENT & PLAN NOTE
She replaced the dressing yesterday due to itching and need to reapply nystatin ointment to the RLE and used Neosporin to the wound. On exam today the leg is wrapped from foot to mid leg with ace and a dry 4x4 covering the wound. The skin of the RLE is healing well, no edema, erythema, multiple healing old excoriations and chronic pigment changes. The ulcer is stable in size with well defined edges and mild slough in the wound bed. The Entire RLE cleaned with antibacterial soap scrub and patted dry. Triple antibiotic ointment applied to wound bed and excess dabbed off. Thin layer of zinc oxide applied to raj-wound to protect from discharge. Nystatin cream applied to remainder of RLE. Wound covered with Maxorb gauze then Xeroform gauze then 4x4 and ABD. Remainder of leg covered with Telfa to protect sticking to old excoriation sites. Leg wrapped with kerlix then ace x 2 from foot to knee. Tolerated well. No signs of infection.    Will repeat process Wednesday and provide supplies so she can perform for herself on Friday as no appointment are available. Next week will see us M/W then has consult with Dr. Aragon on 6/19. Reviewed plan with patient who is agreeable.

## 2025-06-10 NOTE — PROGRESS NOTES
Name: Martha Payan      : 1960      MRN: [id number]  Encounter Provider: Paulo Cota PA-C  Encounter Date: 2025   Encounter department: [facility] GENERAL SURGERY [place]  :  Assessment & Plan  Productive cough  Cough persists however breathing easier during the daytime.  Reports exacerbated at night.  Advise she call her PCP to discuss getting a chest x-ray to evaluate for pneumonia or pleural effusions.  Alternatively she was advised she could go to urgent care for expedited x-ray.  Questions answered, agreeable to this.         Chronic cutaneous venous stasis ulcer (HCC)  Leg wrapped slid down revealing small amount of new excoriation from her scratching the due to pruritus at the top of her right lower extremity below the knee.  With remainder of the dressing removed revealing skin to be clean pink/tan and viable.  Small ulcer laterally on the leg and large ulcer medially.  Medial ulcer with a stable thin layer of slough versus biofilm.  Skin edges clean.  The entire right lower extremity washed with antibacterial soap and water then pat it dry.  Nystatin cream applied to the entire right lower extremity except for the wound.  A layer of zinc oxide applied to the periwound and topical triple antibiotic ointment applied to the wound bed.  Wound was then covered with a Maxorb gauze followed by a folded Xeroform gauze.  A small portion of the Maxorb was cut and cut placed to cover the wound laterally as well.  Excoriated areas covered with Telfa and then the ulcer site was covered with additional 4 x 4 gauze and ABD pads.  Leg wrapped with Kerlix gauze from toes to knee and then Ace wrap's x 2.  Tolerated well.  Agreeable to perform this same dressing change herself at home on  and then return to the office on  with Dr. Xiao.             History of Present Illness   HPI  Martha Payan is a 65 y.o. female who presents Wound check   History obtained from:  patient    Review of Systems   All other systems reviewed and are negative.    Past Medical History   Past Medical History[1]  Past Surgical History[2]  Family History[3]   reports that she has never smoked. She has never used smokeless tobacco. She reports that she does not currently use alcohol. She reports that she does not use drugs.  Current Outpatient Medications   Medication Instructions    acetaminophen (TYLENOL) 650 mg, Every 8 hours PRN    anastrozole (ARIMIDEX) 1 mg, Oral, Daily    ascorbic acid (VITAMIN C) 250 mg, Oral, Daily    aspirin 81 mg, Oral, Daily    atorvastatin (LIPITOR) 40 mg, Oral, Daily    benzonatate (TESSALON PERLES) 100 mg, Oral, Every 8 hours    Cholecalciferol (VITAMIN D-3 PO) Oral, Per patient, she takes 50mg daily.     ciprofloxacin (CIPRO) 500 mg, 2 times daily    clopidogrel (PLAVIX) 75 mg, Oral, Daily    clotrimazole (LOTRIMIN) 1 % cream Apply to affected area 2 times daily for 4 weeks    FERROUS SULFATE PO 65 mg, Oral, Daily, As per GI doctor recommendation    gentamicin (GARAMYCIN) 0.1 % cream Topical, Every other day    HYDROcodone-acetaminophen (Norco) 5-325 mg per tablet 1 tablet, Oral, Every 6 hours PRN    ibuprofen (MOTRIN) 800 mg, Oral, Every 8 hours PRN    levothyroxine 50 mcg tablet 1 tablet, Oral, Daily    levothyroxine 50 mcg, Daily    lidocaine-prilocaine (EMLA) cream Topical, As needed    methylPREDNISolone 4 MG tablet therapy pack Use as directed on package    Multiple Vitamins-Minerals (CENTRUM SILVER 50+WOMEN PO) 1 tablet, Daily    nystatin (MYCOSTATIN) cream Topical, Every other day    pantoprazole (PROTONIX) 40 mg, Oral, 2 times daily    sertraline (ZOLOFT) 25 mg, Daily    torsemide (DEMADEX) 10 mg, Oral, Daily    triamcinolone (KENALOG) 0.5 % cream Topical, Every other day    Verzenio 100 MG TABS 1 tablet, Oral, 2 times daily    vitamin B-12 (VITAMIN B-12) 1,000 mcg, Oral, Daily   Allergies[4]      Objective   Temp 98.9 °F (37.2 °C) (Temporal)      Physical  Exam  Vitals and nursing note reviewed.   Constitutional:       General: She is not in acute distress.     Appearance: She is well-developed. She is not diaphoretic.   HENT:      Head: Normocephalic and atraumatic.     Eyes:      Conjunctiva/sclera: Conjunctivae normal.      Pupils: Pupils are equal, round, and reactive to light.     Pulmonary:      Effort: No respiratory distress.     Musculoskeletal:         General: Normal range of motion.      Cervical back: Normal range of motion.     Skin:     General: Skin is warm and dry.      Capillary Refill: Capillary refill takes less than 2 seconds.      Comments: Mild edema below knee of RLE due to slippage of ace wrap. Skin with mild excoriation from itching. Dermatitis resolved, edema controlled with wrap. Stable wound to medial ankle with slough and clean skin edges.     Neurological:      Mental Status: She is alert and oriented to person, place, and time.     Psychiatric:         Behavior: Behavior normal.                [1]   Past Medical History:  Diagnosis Date    Breast cancer (HCC)     Hypothyroidism     Hypothyroidism     Kidney stones    [2]   Past Surgical History:  Procedure Laterality Date    TUBAL LIGATION     [3] No family history on file.  [4]   Allergies  Allergen Reactions    Amoxicillin-Pot Clavulanate Other (See Comments)     Other Reaction(s): rash    Clindamycin Other (See Comments)     Chest tightness    Iodinated Contrast Media Other (See Comments)    Sulfa Antibiotics Other (See Comments)    Sulfamethoxazole-Trimethoprim Hives

## 2025-06-11 ENCOUNTER — OFFICE VISIT (OUTPATIENT)
Dept: SURGERY | Facility: CLINIC | Age: 65
End: 2025-06-11
Attending: PHYSICIAN ASSISTANT
Payer: COMMERCIAL

## 2025-06-11 VITALS — TEMPERATURE: 98.9 F

## 2025-06-11 DIAGNOSIS — I83.009 CHRONIC CUTANEOUS VENOUS STASIS ULCER (HCC): ICD-10-CM

## 2025-06-11 DIAGNOSIS — R05.8 PRODUCTIVE COUGH: Primary | ICD-10-CM

## 2025-06-11 DIAGNOSIS — L97.909 CHRONIC CUTANEOUS VENOUS STASIS ULCER (HCC): ICD-10-CM

## 2025-06-11 PROCEDURE — 99212 OFFICE O/P EST SF 10 MIN: CPT | Performed by: PHYSICIAN ASSISTANT

## 2025-06-11 NOTE — ASSESSMENT & PLAN NOTE
Leg wrapped slid down revealing small amount of new excoriation from her scratching the due to pruritus at the top of her right lower extremity below the knee.  With remainder of the dressing removed revealing skin to be clean pink/tan and viable.  Small ulcer laterally on the leg and large ulcer medially.  Medial ulcer with a stable thin layer of slough versus biofilm.  Skin edges clean.  The entire right lower extremity washed with antibacterial soap and water then pat it dry.  Nystatin cream applied to the entire right lower extremity except for the wound.  A layer of zinc oxide applied to the periwound and topical triple antibiotic ointment applied to the wound bed.  Wound was then covered with a Maxorb gauze followed by a folded Xeroform gauze.  A small portion of the Maxorb was cut and cut placed to cover the wound laterally as well.  Excoriated areas covered with Telfa and then the ulcer site was covered with additional 4 x 4 gauze and ABD pads.  Leg wrapped with Kerlix gauze from toes to knee and then Ace wrap's x 2.  Tolerated well.  Agreeable to perform this same dressing change herself at home on Friday 6/13 and then return to the office on Monday 6/16 with Dr. Xiao.

## 2025-06-11 NOTE — ASSESSMENT & PLAN NOTE
Cough persists however breathing easier during the daytime.  Reports exacerbated at night.  Advise she call her PCP to discuss getting a chest x-ray to evaluate for pneumonia or pleural effusions.  Alternatively she was advised she could go to urgent care for expedited x-ray.  Questions answered, agreeable to this.

## 2025-06-17 RX ORDER — BUDESONIDE 0.5 MG/2ML
0.5 INHALANT ORAL 2 TIMES DAILY
COMMUNITY
Start: 2025-06-16 | End: 2026-06-16

## 2025-06-17 RX ORDER — PREDNISONE 20 MG/1
40 TABLET ORAL DAILY
COMMUNITY
Start: 2025-06-17 | End: 2025-06-19

## 2025-06-17 RX ORDER — ALBUTEROL SULFATE 90 UG/1
INHALANT RESPIRATORY (INHALATION)
COMMUNITY
Start: 2025-06-06

## 2025-06-17 RX ORDER — IPRATROPIUM BROMIDE AND ALBUTEROL SULFATE 2.5; .5 MG/3ML; MG/3ML
3 SOLUTION RESPIRATORY (INHALATION) 4 TIMES DAILY PRN
COMMUNITY
Start: 2025-06-16 | End: 2026-06-16

## 2025-06-18 ENCOUNTER — OFFICE VISIT (OUTPATIENT)
Dept: SURGERY | Facility: CLINIC | Age: 65
End: 2025-06-18
Attending: PHYSICIAN ASSISTANT
Payer: COMMERCIAL

## 2025-06-18 VITALS
HEART RATE: 94 BPM | OXYGEN SATURATION: 93 % | TEMPERATURE: 97.5 F | SYSTOLIC BLOOD PRESSURE: 114 MMHG | BODY MASS INDEX: 33.31 KG/M2 | HEIGHT: 63 IN | WEIGHT: 188 LBS | DIASTOLIC BLOOD PRESSURE: 80 MMHG

## 2025-06-18 DIAGNOSIS — I83.009 CHRONIC CUTANEOUS VENOUS STASIS ULCER (HCC): Primary | ICD-10-CM

## 2025-06-18 DIAGNOSIS — L97.909 CHRONIC CUTANEOUS VENOUS STASIS ULCER (HCC): Primary | ICD-10-CM

## 2025-06-18 PROCEDURE — 99213 OFFICE O/P EST LOW 20 MIN: CPT | Performed by: PHYSICIAN ASSISTANT

## 2025-06-18 NOTE — PROGRESS NOTES
Name: Martha Payan      : 1960      MRN: [id number]  Encounter Provider: Paulo Cota PA-C  Encounter Date: 2025   Encounter department: [facility] GENERAL SURGERY [place]  :  Assessment & Plan  Chronic cutaneous venous stasis ulcer (HCC)  65 F with severe COPD now on home O2 following recent admission  for hypoxia, CHF, CVA, and metastatic breast CA on oral chemotherapy here for routine wound care for chronic nonhealing wound of RLE x 2+ years. 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 Not diabetic with last A1C 5.5. LEADS done 10/2024 without significant disease and normal healing pressure. She lives independently and after leaving [facility] was independent for wound care for a short period until starting to see us regularly in our office 2 months ago. She has declined VNA services multiple times. She can drive, but does not often drive further than [place] where she lives.    On exam today her RLE edema is well controlled. Prior dermatitis has resolved. She has numerous excoriations, some new, scattered across her RLE related to itching, that previously was responding well to topical Nystatin cream. She did not receive her usual wound care between  and  due to her admission. The large open wound of her medial ankle is grossly stable in size. The skin edges are well demarcated and the wound bed has a very thin layer of slough. Yellow drainage noted on the dressing.    For wound care today: entire RLE gently scrubbed with antibacterial soap and water then patted dry. Nystatin cream applied to RLE sparing the large wound. Topical antibiotic ointment applied in thin layer to wound bed. Ring of zinc oxide applied to wound skin edges. Wound covered with xeroform and Maxorb Ag gauze, bulky 4x4 and ABD. Remainder of leg covered with Telfa and ABD. Leg then wrapped with  kerlix and ACE from foot to knee. Tolerated well.    Confirmed with patient she has consultation with Dr. Aragon tomorrow that she intends to keep for further evaluation and management of her wound.                 History of Present Illness   HPI  Martha Payan is a 65 y.o. female who presents for a wound check. Pt states theres yellow drainage that saturated through. Patient also suffers from soreness. Pt denies any fevers/chills. Amilia.O,MA  History obtained from: patient    Review of Systems   Skin:  Positive for wound.   All other systems reviewed and are negative.    Past Medical History   Past Medical History[1]  Past Surgical History[2]  Family History[3]   reports that she has never smoked. She has never used smokeless tobacco. She reports that she does not currently use alcohol. She reports that she does not use drugs.  Current Outpatient Medications   Medication Instructions    acetaminophen (TYLENOL) 650 mg, Every 8 hours PRN    albuterol (PROVENTIL HFA,VENTOLIN HFA) 90 mcg/act inhaler     anastrozole (ARIMIDEX) 1 mg, Oral, Daily    ascorbic acid (VITAMIN C) 250 mg, Oral, Daily    aspirin 81 mg, Oral, Daily    atorvastatin (LIPITOR) 40 mg, Oral, Daily    benzonatate (TESSALON PERLES) 100 mg, Oral, Every 8 hours    budesonide (PULMICORT) 0.5 mg, 2 times daily    Cholecalciferol (VITAMIN D-3 PO) Oral, Per patient, she takes 50mg daily.     ciprofloxacin (CIPRO) 500 mg, 2 times daily    clopidogrel (PLAVIX) 75 mg, Oral, Daily    clotrimazole (LOTRIMIN) 1 % cream Apply to affected area 2 times daily for 4 weeks    FERROUS SULFATE PO 65 mg, Oral, Daily, As per GI doctor recommendation    gentamicin (GARAMYCIN) 0.1 % cream Topical, Every other day    HYDROcodone-acetaminophen (Norco) 5-325 mg per tablet 1 tablet, Oral, Every 6 hours PRN    ibuprofen (MOTRIN) 800 mg, Oral, Every 8 hours PRN    ipratropium-albuterol (DUO-NEB) 0.5-2.5 mg/3 mL nebulizer solution 3 mL, 4 times daily PRN    levothyroxine 50 mcg  "tablet 1 tablet, Oral, Daily    levothyroxine 50 mcg, Daily    lidocaine-prilocaine (EMLA) cream Topical, As needed    methylPREDNISolone 4 MG tablet therapy pack Use as directed on package    Multiple Vitamins-Minerals (CENTRUM SILVER 50+WOMEN PO) 1 tablet, Daily    nystatin (MYCOSTATIN) cream Topical, Every other day    pantoprazole (PROTONIX) 40 mg, Oral, 2 times daily    predniSONE 40 mg, Daily    sertraline (ZOLOFT) 25 mg, Daily    torsemide (DEMADEX) 10 mg, Oral, Daily    triamcinolone (KENALOG) 0.5 % cream Topical, Every other day    Verzenio 100 MG TABS 1 tablet, Oral, 2 times daily    vitamin B-12 (VITAMIN B-12) 1,000 mcg, Oral, Daily   Allergies[4]      Objective   /80 (BP Location: Left arm, Patient Position: Sitting, Cuff Size: Standard)   Pulse 94   Temp 97.5 °F (36.4 °C) (Temporal)   Ht 5' 3\" (1.6 m)   Wt 85.3 kg (188 lb)   SpO2 93%   BMI 33.30 kg/m²      Physical Exam  Vitals and nursing note reviewed.   Constitutional:       General: She is not in acute distress.     Appearance: She is well-developed. She is not diaphoretic.      Comments: Ambulates with cane, has portable O2 tank.   HENT:      Head: Normocephalic and atraumatic.     Eyes:      Conjunctiva/sclera: Conjunctivae normal.     Pulmonary:      Effort: No respiratory distress.     Musculoskeletal:         General: Normal range of motion.      Cervical back: Normal range of motion.     Skin:     General: Skin is warm and dry.      Capillary Refill: Capillary refill takes less than 2 seconds.      Comments: RLE wound as described above.     Neurological:      Mental Status: She is alert and oriented to person, place, and time.     Psychiatric:         Behavior: Behavior normal.                [1]   Past Medical History:  Diagnosis Date    Breast cancer (HCC)     Hypothyroidism     Hypothyroidism     Kidney stones    [2]   Past Surgical History:  Procedure Laterality Date    TUBAL LIGATION     [3] No family history on file.  [4] "   Allergies  Allergen Reactions    Amoxicillin-Pot Clavulanate Other (See Comments)     Other Reaction(s): rash    Clindamycin Other (See Comments)     Chest tightness    Iodinated Contrast Media Other (See Comments)    Sulfa Antibiotics Other (See Comments)    Sulfamethoxazole-Trimethoprim Hives

## 2025-06-18 NOTE — ASSESSMENT & PLAN NOTE
65 F with severe COPD now on home O2 following recent admission 6/13 for hypoxia, CHF, CVA, and metastatic breast CA on oral chemotherapy here for routine wound care for chronic nonhealing wound of RLE x 2+ years. 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 Not diabetic with last A1C 5.5. LEADS done 10/2024 without significant disease and normal healing pressure. She lives independently and after leaving [facility] was independent for wound care for a short period until starting to see us regularly in our office 2 months ago. She has declined VNA services multiple times. She can drive, but does not often drive further than [place] where she lives.    On exam today her RLE edema is well controlled. Prior dermatitis has resolved. She has numerous excoriations, some new, scattered across her RLE related to itching, that previously was responding well to topical Nystatin cream. She did not receive her usual wound care between 6/13 and 6/18 due to her admission. The large open wound of her medial ankle is grossly stable in size. The skin edges are well demarcated and the wound bed has a very thin layer of slough. Yellow drainage noted on the dressing.    For wound care today: entire RLE gently scrubbed with antibacterial soap and water then patted dry. Nystatin cream applied to RLE sparing the large wound. Topical antibiotic ointment applied in thin layer to wound bed. Ring of zinc oxide applied to wound skin edges. Wound covered with xeroform and Maxorb Ag gauze, bulky 4x4 and ABD. Remainder of leg covered with Telfa and ABD. Leg then wrapped with kerlix and ACE from foot to knee. Tolerated well.    Confirmed with patient she has consultation with Dr. Aragon tomorrow that she intends to keep for further evaluation and management of her wound.

## 2025-06-19 ENCOUNTER — OFFICE VISIT (OUTPATIENT)
Dept: WOUND CARE | Facility: CLINIC | Age: 65
End: 2025-06-19
Payer: COMMERCIAL

## 2025-06-19 ENCOUNTER — TELEPHONE (OUTPATIENT)
Dept: SURGERY | Facility: CLINIC | Age: 65
End: 2025-06-19

## 2025-06-19 VITALS
SYSTOLIC BLOOD PRESSURE: 132 MMHG | WEIGHT: 194 LBS | BODY MASS INDEX: 34.38 KG/M2 | HEART RATE: 98 BPM | TEMPERATURE: 97.5 F | DIASTOLIC BLOOD PRESSURE: 72 MMHG | HEIGHT: 63 IN | RESPIRATION RATE: 20 BRPM

## 2025-06-19 DIAGNOSIS — I87.331 CHRONIC VENOUS HYPERTENSION (IDIOPATHIC) WITH ULCER AND INFLAMMATION OF RIGHT LOWER EXTREMITY (HCC): Primary | ICD-10-CM

## 2025-06-19 PROCEDURE — 99213 OFFICE O/P EST LOW 20 MIN: CPT | Performed by: SURGERY

## 2025-06-19 NOTE — TELEPHONE ENCOUNTER
Patient called into the Pod to speak with our office in regards to an appointment with [facility] .  I made one for Monday and we discussed C and I asked the patient if she needed supplies to get her through the weekend and she asked if she could get some xeroform.  I stated she could come into the office and I will give her some to get her through until Monday. Also a message was sent to [facility]

## 2025-06-19 NOTE — PATIENT INSTRUCTIONS
Orders Placed This Encounter   Procedures    Wound cleansing and dressings     Continue current treatment     Recommend calcium alginate to wound bases and a compression wrap  Dr. Aragon will discuss recommendations with Paulo Cota PA-C with hopes that home health will be able to assist with dressing changes 3x week      Follow up with Paulo Cota PA-C     Standing Status:   Future     Expiration Date:   6/26/2025

## 2025-06-19 NOTE — PROGRESS NOTES
Wound Procedure Treatment    Performed by: Mabel Lundberg RN  Authorized by: Antonio Aragon DO  Associated wounds:   Wound 06/19/25 Leg Right;Lower;Medial  Wound 06/19/25 Leg Proximal;Right;Lower    Applied primary dressing:  Calcium alginate   Applied secondary dressing:  ABD   Dressing secured with:  Pedro and Tape   Comments:  Xeroform to wound bases   ACE wrap

## 2025-06-23 PROBLEM — I87.331 CHRONIC VENOUS HYPERTENSION (IDIOPATHIC) WITH ULCER AND INFLAMMATION OF RIGHT LOWER EXTREMITY (HCC): Status: ACTIVE | Noted: 2025-06-23

## 2025-06-23 NOTE — PROGRESS NOTES
Name: Martha Payan      : 1960      MRN: [id number]  Encounter Provider: Antonio Aragon DO  Encounter Date: 2025   Encounter department: [facility] 234 beds at the [facility] WOUND CARE  :  Assessment & Plan  Chronic venous hypertension (idiopathic) with ulcer and inflammation of right lower extremity (HCC)  Chronic venous hypertension the right lower extremity with large ulceration has some scattered smaller ulcerations.  No evidence of active infection.  Patient unhappy about being here and would prefer to be following with the general surgery office closer to where she lives.  She does not want to come back here.  Patient not interested in any treatment at this time.  Did discuss the need for debridement and for Unna boot therapy however patient did not want anything done.  For now we will place Xeroform and wrap the leg with Pedro.  Patient should follow-up with the general surgery office.    Orders:    Wound cleansing and dressings; Future    Wound Procedure Treatment      Imaging:    Recent venous duplex dated 2024 report and arterial du    Notes:    Recent office visit to general surgery PA dated 2025 and 2025 were both personally reviewed by me.    History of Present Illness   Chief Complaint   Patient presents with    New Patient Visit   Here for wound follow up.  With longstanding ulceration of the right lower extremity who is currently following with general surgery in the [facility], presents today for second opinion and consultation regarding overall treatment and management of this longstanding ulceration.  Patient does complain of pain to the right lower extremity at the ulceration.  Patient more or less stated that she did not want to come here and she is unhappy that she is here.  This is not her normal area and she got lost and she would prefer not to come back.  She would prefer to follow-up with the [facility] and [facility].      Objective   /72   Pulse 98   Temp  "97.5 °F (36.4 °C)   Resp 20   Ht 5' 3\" (1.6 m)   Wt 88 kg (194 lb)   BMI 34.37 kg/m²     Physical Exam  Vitals reviewed. Exam conducted with a chaperone present.   Constitutional:       General: She is not in acute distress.     Appearance: Normal appearance. She is not ill-appearing, toxic-appearing or diaphoretic.   HENT:      Head: Normocephalic and atraumatic.      Right Ear: External ear normal.      Left Ear: External ear normal.     Eyes:      General: No scleral icterus.        Right eye: No discharge.         Left eye: No discharge.       Cardiovascular:      Rate and Rhythm: Normal rate.   Pulmonary:      Effort: Pulmonary effort is normal. No respiratory distress.     Musculoskeletal:      Cervical back: Normal range of motion.      Right lower leg: Edema present.      Left lower leg: Edema present.     Skin:     General: Skin is warm.      Coloration: Skin is not jaundiced or pale.      Findings: Lesion present.      Comments: Right lower extremity with mild edema.  Evidence of large ulceration with noted slough.  Well-demarcated.  Noted healthy better granulation tissue as well.  No debridement undertaken due to patient refusal.  Scattered small ulcerations throughout the leg.  No evidence of obvious infection.     Neurological:      General: No focal deficit present.      Mental Status: She is alert and oriented to person, place, and time.      Cranial Nerves: No cranial nerve deficit.     Psychiatric:         Mood and Affect: Mood is anxious.         Behavior: Behavior normal.         Thought Content: Thought content normal.         Judgment: Judgment normal.       Wound 06/19/25 Leg Right;Lower;Medial (Active)   Wound Image   06/19/25 0830   Wound Description Pink;Yellow;Slough;Granulation tissue 06/19/25 0828   Non-staged Wound Description Full thickness 06/19/25 0828   Wound Length (cm) 8.5 cm 06/19/25 0828   Wound Width (cm) 8.2 cm 06/19/25 0828   Wound Depth (cm) 0.2 cm 06/19/25 0828   Wound " Surface Area (cm^2) 54.74 cm^2 06/19/25 0828   Wound Volume (cm^3) 7.299 cm^3 06/19/25 0828   Calculated Wound Volume (cm^3) 13.94 cm^3 06/19/25 0828   Drainage Amount Large 06/19/25 0828   Drainage Description Green;Yellow 06/19/25 0828   Joy-wound Assessment Dry;Intact;Scar Tissue;Rash 06/19/25 0828       Wound 06/19/25 Leg Proximal;Right;Lower (Active)   Wound Image   06/19/25 0831   Wound Description Pink;Granulation tissue;Yellow;Epithelialization 06/19/25 0834   Non-staged Wound Description Full thickness 06/19/25 0834   Wound Length (cm) 8.4 cm 06/19/25 0834   Wound Width (cm) 0.2 cm 06/19/25 0834   Wound Depth (cm) 0.1 cm 06/19/25 0834   Wound Surface Area (cm^2) 1.32 cm^2 06/19/25 0834   Wound Volume (cm^3) 0.088 cm^3 06/19/25 0834   Calculated Wound Volume (cm^3) 0.17 cm^3 06/19/25 0834   Drainage Amount Scant 06/19/25 0834   Drainage Description Yellow 06/19/25 0834   Joy-wound Assessment Dry;Intact 06/19/25 0834       Procedures         65-year-old female

## 2025-06-23 NOTE — ASSESSMENT & PLAN NOTE
Chronic venous hypertension the right lower extremity with large ulceration has some scattered smaller ulcerations.  No evidence of active infection.  Patient unhappy about being here and would prefer to be following with the general surgery office closer to where she lives.  She does not want to come back here.  Patient not interested in any treatment at this time.  Did discuss the need for debridement and for Unna boot therapy however patient did not want anything done.  For now we will place Xeroform and wrap the leg with Pedro.  Patient should follow-up with the general surgery office.    Orders:    Wound cleansing and dressings; Future    Wound Procedure Treatment

## 2025-06-24 NOTE — PROGRESS NOTES
Name: Martha Payan      : 1960      MRN: [id number]  Encounter Provider: Paulo Cota PA-C  Encounter Date: 2025   Encounter department: [facility] GENERAL SURGERY [place]  :  Assessment & Plan  Chronic cutaneous venous stasis ulcer (HCC)  RLE and wound stable on exam today. Mild new excoriations to RLE. Moderate slough in the wound bed. Wound measured through center point each way, measures 9.0 x 8.5 x 0.3 cm. Reviewed with her the recommendations by [place] wound care center consult of debridement and trial of Unna boot therapy. Patient agreeable. Lidocaine gel placed on wound and left to sit x 10 minutes, still having tenderness so this was repeated a second time then she was able to allow debridement of slough with a curette blade. Tolerated well, small oozing spots controlled with pressure. Entire surface of wound treated with curettage, however larger chucks of rubbery exudate removed in various locations. Following this the skin of RLE treated with Nystatin cream, raj-wound treated with zinc oxide. Unna boot applied from foot to knee, then wound covered with large ABD, then leg wrapped with kerlix and ace. Tolerated well.    Patient agreeable to additional debridements if necessary. Will also send Rx for collagenase to supplement sharp debridement. Will see back in 2 days to assess response to Unna boot and make additional recommendations at that time.    Orders:    collagenase (SANTYL) ointment; Apply topically every other day    Respiratory failure with hypoxia, unspecified chronicity (HCC)  Recently admitted for respiratory failure and discharged on home O2. Today presented with O2 tank not turned on. Found to have SpO2 of 65% with some dizziness. Placed on O2 by the MA her SpO2 rapidly hailey to 96%. Observed for about 1 hour in office today and had no signs of respiratory distress. I advised she accept VNA support for monitoring at home, which she refused stating she has family  support.             History of Present Illness   HPI  Martha Payan is a 65 y.o. female who presents for wound follow up   History obtained from: patient    Review of Systems   Skin:  Positive for wound.   Neurological:  Positive for dizziness.   All other systems reviewed and are negative.    Past Medical History   Past Medical History[1]  Past Surgical History[2]  Family History[3]   reports that she has never smoked. She has never used smokeless tobacco. She reports that she does not currently use alcohol. She reports that she does not use drugs.  Current Outpatient Medications   Medication Instructions    acetaminophen (TYLENOL) 650 mg, Every 8 hours PRN    albuterol (PROVENTIL HFA,VENTOLIN HFA) 90 mcg/act inhaler     anastrozole (ARIMIDEX) 1 mg, Oral, Daily    ascorbic acid (VITAMIN C) 250 mg, Oral, Daily    aspirin 81 mg, Oral, Daily    atorvastatin (LIPITOR) 40 mg, Oral, Daily    benzonatate (TESSALON PERLES) 100 mg, Oral, Every 8 hours    budesonide (PULMICORT) 0.5 mg, 2 times daily    Cholecalciferol (VITAMIN D-3 PO) Oral, Per patient, she takes 50mg daily.     ciprofloxacin (CIPRO) 500 mg, 2 times daily    clopidogrel (PLAVIX) 75 mg, Oral, Daily    clotrimazole (LOTRIMIN) 1 % cream Apply to affected area 2 times daily for 4 weeks    collagenase (SANTYL) ointment Topical, Every other day    FERROUS SULFATE PO 65 mg, Oral, Daily, As per GI doctor recommendation    gentamicin (GARAMYCIN) 0.1 % cream Topical, Every other day    HYDROcodone-acetaminophen (Norco) 5-325 mg per tablet 1 tablet, Oral, Every 6 hours PRN    ibuprofen (MOTRIN) 800 mg, Oral, Every 8 hours PRN    ipratropium-albuterol (DUO-NEB) 0.5-2.5 mg/3 mL nebulizer solution 3 mL, 4 times daily PRN    levothyroxine 50 mcg tablet 1 tablet, Oral, Daily    levothyroxine 50 mcg, Daily    lidocaine-prilocaine (EMLA) cream Topical, As needed    methylPREDNISolone 4 MG tablet therapy pack Use as directed on package    Multiple Vitamins-Minerals (CENTRUM  SILVER 50+WOMEN PO) 1 tablet, Daily    nystatin (MYCOSTATIN) cream Topical, Every other day    pantoprazole (PROTONIX) 40 mg, Oral, 2 times daily    sertraline (ZOLOFT) 25 mg, Daily    torsemide (DEMADEX) 10 mg, Oral, Daily    triamcinolone (KENALOG) 0.5 % cream Topical, Every other day    Verzenio 100 MG TABS 1 tablet, Oral, 2 times daily    vitamin B-12 (VITAMIN B-12) 1,000 mcg, Oral, Daily   Allergies[4]      Objective   Temp 98.4 °F (36.9 °C) (Temporal)   Resp 20   SpO2 95% Comment: with o2 but had to remind the patient to turn on the oxygen tank she di not have it on at first and her o2 level got down to 65     Physical Exam  Vitals and nursing note reviewed.   Constitutional:       General: She is not in acute distress.     Appearance: She is well-developed. She is not diaphoretic.   HENT:      Head: Normocephalic and atraumatic.     Eyes:      Conjunctiva/sclera: Conjunctivae normal.      Pupils: Pupils are equal, round, and reactive to light.     Pulmonary:      Effort: Pulmonary effort is normal. No respiratory distress.     Musculoskeletal:         General: Normal range of motion.      Cervical back: Normal range of motion.     Skin:     General: Skin is warm and dry.      Capillary Refill: Capillary refill takes less than 2 seconds.      Comments: RLE wound as described above.     Neurological:      Mental Status: She is alert and oriented to person, place, and time.     Psychiatric:         Behavior: Behavior normal.       Debridement     Date/Time: 6/25/2025 11:30 AM    Universal Protocol:  procedure performed by consultantConsent: Verbal consent obtained  Risks and benefits: risks, benefits and alternatives were discussed  Consent given by: patient  Patient understanding: patient states understanding of the procedure being performed  Patient identity confirmed: verbally with patient    Debridement Details  Performed by: PA  Debridement type: surgical  Level of debridement: subcutaneous tissue  Pain  control: lidocaine 2%    Post-debridement measurements  Length (cm): 9  Width (cm): 8.5  Depth (cm): 0.3  Percent debrided: 100%  Surface Area (cm^2): 60.08  Area Debrided (cm^2): 60.08  Volume (cm^3): 12.02    Tissue and other material debrided: subcutaneous tissue  Devitalized tissue debrided: biofilm, exudate, fibrin and slough  Instrument(s) utilized: curette  Technique utilized: excisional  Bleeding: small  Hemostasis obtained with: pressure  Procedural pain (0-10): insensate  Post-procedural pain: insensate   Response to treatment: procedure was tolerated well               [1]   Past Medical History:  Diagnosis Date    Breast cancer (HCC)     Hypothyroidism     Hypothyroidism     Kidney stones    [2]   Past Surgical History:  Procedure Laterality Date    TUBAL LIGATION     [3] No family history on file.  [4]   Allergies  Allergen Reactions    Amoxicillin-Pot Clavulanate Other (See Comments)     Other Reaction(s): rash    Clindamycin Other (See Comments)     Chest tightness    Iodinated Contrast Media Other (See Comments)    Sulfa Antibiotics Other (See Comments)    Sulfamethoxazole-Trimethoprim Hives

## 2025-06-25 ENCOUNTER — OFFICE VISIT (OUTPATIENT)
Dept: SURGERY | Facility: CLINIC | Age: 65
End: 2025-06-25
Payer: COMMERCIAL

## 2025-06-25 VITALS — OXYGEN SATURATION: 95 % | RESPIRATION RATE: 20 BRPM | TEMPERATURE: 98.4 F

## 2025-06-25 DIAGNOSIS — I83.009 CHRONIC CUTANEOUS VENOUS STASIS ULCER (HCC): Primary | ICD-10-CM

## 2025-06-25 DIAGNOSIS — J96.91 RESPIRATORY FAILURE WITH HYPOXIA, UNSPECIFIED CHRONICITY (HCC): ICD-10-CM

## 2025-06-25 DIAGNOSIS — L97.909 CHRONIC CUTANEOUS VENOUS STASIS ULCER (HCC): Primary | ICD-10-CM

## 2025-06-25 PROCEDURE — 11042 DBRDMT SUBQ TIS 1ST 20SQCM/<: CPT | Performed by: PHYSICIAN ASSISTANT

## 2025-06-25 PROCEDURE — 99212 OFFICE O/P EST SF 10 MIN: CPT | Performed by: PHYSICIAN ASSISTANT

## 2025-06-25 PROCEDURE — 11045 DBRDMT SUBQ TISS EACH ADDL: CPT | Performed by: PHYSICIAN ASSISTANT

## 2025-06-25 NOTE — ASSESSMENT & PLAN NOTE
RLE and wound stable on exam today. Mild new excoriations to RLE. Moderate slough in the wound bed. Wound measured through center point each way, measures 9.0 x 8.5 x 0.3 cm. Reviewed with her the recommendations by [place] wound care center consult of debridement and trial of Unna boot therapy. Patient agreeable. Lidocaine gel placed on wound and left to sit x 10 minutes, still having tenderness so this was repeated a second time then she was able to allow debridement of slough with a curette blade. Tolerated well, small oozing spots controlled with pressure. Entire surface of wound treated with curettage, however larger chucks of rubbery exudate removed in various locations. Following this the skin of RLE treated with Nystatin cream, raj-wound treated with zinc oxide. Unna boot applied from foot to knee, then wound covered with large ABD, then leg wrapped with kerlix and ace. Tolerated well.    Patient agreeable to additional debridements if necessary. Will also send Rx for collagenase to supplement sharp debridement. Will see back in 2 days to assess response to Unna boot and make additional recommendations at that time.    Orders:    collagenase (SANTYL) ointment; Apply topically every other day

## 2025-06-25 NOTE — ASSESSMENT & PLAN NOTE
Recently admitted for respiratory failure and discharged on home O2. Today presented with O2 tank not turned on. Found to have SpO2 of 65% with some dizziness. Placed on O2 by the MA her SpO2 rapidly hailey to 96%. Observed for about 1 hour in office today and had no signs of respiratory distress. I advised she accept VNA support for monitoring at home, which she refused stating she has family support.

## 2025-06-30 DIAGNOSIS — L24.4 IRRITANT CONTACT DERMATITIS DUE TO DRUG IN CONTACT WITH SKIN: ICD-10-CM

## 2025-06-30 DIAGNOSIS — L97.909 CHRONIC CUTANEOUS VENOUS STASIS ULCER (HCC): ICD-10-CM

## 2025-06-30 DIAGNOSIS — B36.9 FUNGAL DERMATITIS: ICD-10-CM

## 2025-06-30 DIAGNOSIS — I83.009 CHRONIC CUTANEOUS VENOUS STASIS ULCER (HCC): ICD-10-CM

## 2025-06-30 PROBLEM — I50.32 CHRONIC DIASTOLIC HEART FAILURE (HCC): Status: ACTIVE | Noted: 2025-06-30

## 2025-06-30 PROBLEM — C50.919 BREAST CANCER, STAGE 4 (HCC): Status: ACTIVE | Noted: 2025-06-30

## 2025-06-30 RX ORDER — TRIAMCINOLONE ACETONIDE 5 MG/G
CREAM TOPICAL
Qty: 15 G | Refills: 1 | OUTPATIENT
Start: 2025-06-30

## 2025-06-30 RX ORDER — NYSTATIN 100000 U/G
CREAM TOPICAL EVERY OTHER DAY
Qty: 30 G | Refills: 0 | Status: ON HOLD | OUTPATIENT
Start: 2025-06-30

## 2025-06-30 RX ORDER — LIDOCAINE AND PRILOCAINE 25; 25 MG/G; MG/G
CREAM TOPICAL AS NEEDED
Qty: 30 G | Refills: 0 | Status: ON HOLD | OUTPATIENT
Start: 2025-06-30